# Patient Record
Sex: FEMALE | Race: WHITE | Employment: PART TIME | URBAN - METROPOLITAN AREA
[De-identification: names, ages, dates, MRNs, and addresses within clinical notes are randomized per-mention and may not be internally consistent; named-entity substitution may affect disease eponyms.]

---

## 2017-01-20 ENCOUNTER — HOSPITAL ENCOUNTER (OUTPATIENT)
Dept: PHYSICAL THERAPY | Age: 62
Discharge: HOME | End: 2017-01-31
Attending: ORTHOPAEDIC SURGERY | Admitting: ORTHOPAEDIC SURGERY

## 2017-01-20 ASSESSMENT — PAIN DESCRIPTION - LOCATION: LOCATION: BACK;BUTTOCKS;HIP;LEG

## 2017-01-20 ASSESSMENT — PAIN SCALES - GENERAL: PAINLEVEL_OUTOF10: 4

## 2017-01-20 ASSESSMENT — PAIN DESCRIPTION - PAIN TYPE: TYPE: CHRONIC PAIN

## 2017-01-20 ASSESSMENT — PAIN DESCRIPTION - FREQUENCY: FREQUENCY: CONTINUOUS

## 2017-01-20 ASSESSMENT — PAIN DESCRIPTION - PROGRESSION: CLINICAL_PROGRESSION: GRADUALLY WORSENING

## 2017-01-20 ASSESSMENT — PAIN DESCRIPTION - ONSET: ONSET: PROGRESSIVE

## 2017-01-20 ASSESSMENT — PAIN DESCRIPTION - DESCRIPTORS: DESCRIPTORS: SHARP;RADIATING

## 2017-01-20 ASSESSMENT — PAIN DESCRIPTION - ORIENTATION: ORIENTATION: RIGHT

## 2017-01-23 ENCOUNTER — HOSPITAL ENCOUNTER (OUTPATIENT)
Dept: PHYSICAL THERAPY | Age: 62
Discharge: HOME | End: 2017-01-23
Admitting: ORTHOPAEDIC SURGERY

## 2017-01-25 ENCOUNTER — HOSPITAL ENCOUNTER (OUTPATIENT)
Dept: PHYSICAL THERAPY | Age: 62
Discharge: HOME | End: 2017-01-25
Admitting: ORTHOPAEDIC SURGERY

## 2017-01-30 ENCOUNTER — HOSPITAL ENCOUNTER (OUTPATIENT)
Dept: PHYSICAL THERAPY | Age: 62
Discharge: HOME | End: 2017-01-30
Admitting: ORTHOPAEDIC SURGERY

## 2017-02-01 ENCOUNTER — HOSPITAL ENCOUNTER (OUTPATIENT)
Dept: OTHER | Age: 62
Discharge: HOME | End: 2017-02-28
Attending: ORTHOPAEDIC SURGERY | Admitting: ORTHOPAEDIC SURGERY

## 2017-02-06 ENCOUNTER — HOSPITAL ENCOUNTER (OUTPATIENT)
Dept: PHYSICAL THERAPY | Age: 62
Discharge: HOME | End: 2017-02-06
Admitting: ORTHOPAEDIC SURGERY

## 2017-02-08 ENCOUNTER — HOSPITAL ENCOUNTER (OUTPATIENT)
Dept: PHYSICAL THERAPY | Age: 62
Discharge: HOME | End: 2017-02-08
Admitting: ORTHOPAEDIC SURGERY

## 2017-03-01 ENCOUNTER — HOSPITAL ENCOUNTER (OUTPATIENT)
Dept: OTHER | Age: 62
Discharge: HOME | End: 2017-03-14
Attending: ORTHOPAEDIC SURGERY | Admitting: ORTHOPAEDIC SURGERY

## 2017-08-22 ENCOUNTER — HOSPITAL ENCOUNTER (OUTPATIENT)
Dept: WOMENS IMAGING | Age: 62
Discharge: HOME | End: 2017-08-22
Attending: ORTHOPAEDIC SURGERY | Admitting: NURSE PRACTITIONER

## 2017-08-22 DIAGNOSIS — Z12.31 VISIT FOR SCREENING MAMMOGRAM: ICD-10-CM

## 2018-05-16 ENCOUNTER — HOSPITAL ENCOUNTER (OUTPATIENT)
Dept: WOMENS IMAGING | Age: 63
Discharge: HOME | End: 2018-05-16
Attending: NURSE PRACTITIONER | Admitting: NURSE PRACTITIONER

## 2018-05-16 DIAGNOSIS — Z13.820 OSTEOPOROSIS SCREENING: ICD-10-CM

## 2018-05-16 DIAGNOSIS — Z13.820 ENCOUNTER FOR SCREENING FOR OSTEOPOROSIS: ICD-10-CM

## 2018-05-21 ENCOUNTER — HOSPITAL ENCOUNTER (OUTPATIENT)
Dept: PHYSICAL THERAPY | Age: 63
Discharge: HOME | End: 2018-05-31
Attending: ORTHOPAEDIC SURGERY | Admitting: ORTHOPAEDIC SURGERY

## 2018-05-21 DIAGNOSIS — Z13.820 ENCOUNTER FOR SCREENING FOR OSTEOPOROSIS: ICD-10-CM

## 2018-05-23 ENCOUNTER — HOSPITAL ENCOUNTER (OUTPATIENT)
Dept: PHYSICAL THERAPY | Age: 63
Discharge: HOME | End: 2018-05-23
Admitting: ORTHOPAEDIC SURGERY

## 2018-05-30 ENCOUNTER — HOSPITAL ENCOUNTER (OUTPATIENT)
Dept: PHYSICAL THERAPY | Age: 63
Discharge: HOME | End: 2018-05-30
Admitting: ORTHOPAEDIC SURGERY

## 2018-06-01 ENCOUNTER — HOSPITAL ENCOUNTER (OUTPATIENT)
Dept: PHYSICAL THERAPY | Age: 63
Discharge: HOME | End: 2018-06-01
Admitting: ORTHOPAEDIC SURGERY

## 2018-06-01 ENCOUNTER — HOSPITAL ENCOUNTER (OUTPATIENT)
Dept: OTHER | Age: 63
Discharge: HOME | End: 2018-06-30
Attending: ORTHOPAEDIC SURGERY | Admitting: ORTHOPAEDIC SURGERY

## 2018-06-04 ENCOUNTER — HOSPITAL ENCOUNTER (OUTPATIENT)
Dept: PHYSICAL THERAPY | Age: 63
Discharge: HOME | End: 2018-06-04
Admitting: ORTHOPAEDIC SURGERY

## 2018-06-18 ENCOUNTER — HOSPITAL ENCOUNTER (OUTPATIENT)
Dept: PHYSICAL THERAPY | Age: 63
Discharge: HOME | End: 2018-06-18
Admitting: ORTHOPAEDIC SURGERY

## 2018-06-20 ENCOUNTER — HOSPITAL ENCOUNTER (OUTPATIENT)
Dept: PHYSICAL THERAPY | Age: 63
Discharge: HOME | End: 2018-06-20
Admitting: ORTHOPAEDIC SURGERY

## 2018-06-25 ENCOUNTER — HOSPITAL ENCOUNTER (OUTPATIENT)
Dept: PHYSICAL THERAPY | Age: 63
Discharge: HOME | End: 2018-06-25
Admitting: ORTHOPAEDIC SURGERY

## 2018-06-29 ENCOUNTER — HOSPITAL ENCOUNTER (OUTPATIENT)
Dept: PHYSICAL THERAPY | Age: 63
Discharge: HOME | End: 2018-06-29
Admitting: ORTHOPAEDIC SURGERY

## 2018-07-01 ENCOUNTER — HOSPITAL ENCOUNTER (OUTPATIENT)
Dept: OTHER | Age: 63
Discharge: HOME | End: 2018-07-31
Attending: ORTHOPAEDIC SURGERY | Admitting: ORTHOPAEDIC SURGERY

## 2018-07-02 ENCOUNTER — HOSPITAL ENCOUNTER (OUTPATIENT)
Dept: PHYSICAL THERAPY | Age: 63
Discharge: HOME | End: 2018-07-02
Admitting: ORTHOPAEDIC SURGERY

## 2018-07-02 NOTE — FLOWSHEET NOTE
--  -       -    STANDING   -       -    Side stepping with TB 10'x8 GTB -- -    Shuttle Leg press   DL  SL   x15, 3C  x30 B, 1C   3C 10x2  1C x 15 ea LE - 3C 10x2  1C x 20 ea LE   DL HS curl machine x15, 30# DL 30#  x20  SL 15# x 15 ea - DL 30#  x20  SL 15# x 15 ea   Calf stretch FR 1'  FR 1'x2 -  FR 1'x2   HS stretch 1' B 1' B - 1' B   Calf raises x25 FR x 20  - FR x 20    Abduction ball squeeze with LAQ  --  -    Wobble board  1' ea 1' x 2 ea - 1' ea x 2 ea   Anterior step ups   x20, 4\" 4\" 10x2 - 4\" 15x  ea   Anterior step downs   x20, 4\" 4\" 10x2 - 4\" x 15 ea   Lateral step ups --  - 4\" x15 ea   Iso glute w/ abll Unable   -    Sit<>stands   x10, 20\" 20\" 10x2 - --   Anterior lunge   --  -    Seated HS curl   x25 B BTB BTB 10x2 ea - --     Other Therapeutic Activities/Education:     Home Exercise Program:  Added HS and gastroc stretch 6/18/18; added seated HS curl 6/20/18    Modality/intervention used:    [] Therapeutic Exercise  [] Modalities:  [] Therapeutic Activity     [] Ultrasound  [] Elec  Stim  [] Gait Training      [] Cervical Traction [] Lumbar Traction  [x] Neuromuscular Re-education    [] Cold/hotpack [] Iontophoresis   [] Instruction in HEP      [x] Vasopneumatic     [x] Manual Therapy               [] Aquatic Therapy     Manual Treatments:  None    Modalities: none    Communication with other providers:  None    Education provided to patient/caregiver:  . Adverse reactions to treatment:  None    Equipment provided:  None    Assessment:  Pt tolerated treatment fair today. Pt was able to add lateral step ups today. Pt struggled with regular steps today. Pt rated pain at 0/10 in B knees after treatment. Pt will continue to benefit from more strengthening.       Time In / Time Out:   1350/1443    Timed Code/Total Treatment Minutes:  53'/ 48'  3 TA ( 45') 1 neuro ( 15')      Patients Report of Tolerance:    [] Patient limited by fatigue        [] Patient limited by pain   [] Patient limited by other

## 2018-07-02 NOTE — PROGRESS NOTES
Outpatient Physical Therapy           [place]           [x] Phone: [phone]   Fax: [phone]  [place]           [] Phone: [phone]   Fax: [phone]      To: Pratik Kamara    From: Arthur Campos, PT     Patient: Roetta Bloch                  : 1955  Diagnosis: Bilateral Knee OA       Date: 2018  Treatment Diagnosis:  decreased bilateral knee ROM, strength, and balance       [x]  Progress Note                []  Discharge Note    Evaluation Date:     Total Visits to date:  6  Cancels/No-shows to date:        Plan of Care/Treatment to date:  [x] Therapeutic Exercise    [] Modalities:  [x] Therapeutic Activity     [] Ultrasound  [] Electrical Stimulation  [x] Gait Training      [] Cervical Traction   [] Lumbar Traction  [x] Neuromuscular Re-education  [] Cold/hotpack [] Iontophoresis  [x] Instruction in HEP      Other:  [x] Manual Therapy       []  Vasopneumatic  [] Aquatic Therapy       []                    ? Objective/Significant Findings At Last Visit/Comments:    KNEE  AROM  Flexion 110 right, 102 left  Extension    MMT  Flexion 4+/5 left, 4/5 right  Extension 5/5 left, 4/5 right    KOOS 39% from 89% at eval.       Assessment:   Myron Meier has been seen in PT for 11 visits with bilateral knee OA. PT session have been focused on improving ROM, strength and functional mobility. She has made mild strength gains but knee is still quite weak. There has been significant improvement in functional outcome questionnaire. Still limited with ability to safely perform stairs. Pt will continue to benefit from PT addressing ROM and strength to help improve functional mobilty. Goal Status:  [] Achieved [x] Partially Achieved  [] Not Achieved     Changes to goals:    Short term goals  Time Frame for Short term goals: 3 weeks   Short term goal 1:  Increase knee flex bilaterally to 130 NOT MET  Short term goal 2: Pt will decrease KOOS score to 18 points  MET  Short term goal 3: Pt able to complete sit to stand from 18\" height chair without use of UE MET     Long term goals  Time Frame for Long term goals : 6 weeks   Long term goal 1: Pt will decrease KOOS score to 11 points MET  Long term goal 2: Increase right knee extension MMT to 5/5 NOT MET  Long term goal 3: Pt will complete flight of stairs with single handrail reciprocally NOT MET      G-Code Selection: (On Eval and every 10th visit or Discharge)  MEASURE  [x] Mobility: Walking and Moving Around     [x] Current ()   [x] Goal ()   [] DC ()  [] Changing/Maintaining Body Position     [] Current (3753)      [] Goal ()   [] DC ()  [] Carrying / Moving / Handling Objects     [] Current ()   [] Goal ()   [] DC ()  [] Self-Care     [] Current ()   [] Goal ()   [] DC ()  [] Other PT/OT primary DX     [] Current ()   [] Goal ()   [] DC ()    SEVERITY  CURRENT  GOAL  DISCHARGE   [] CH (0% Impaired, Indep.)  [] CI (1-19% Impaired, SBA-CGA)  [x] CJ (20-39% Impaired, MIN A)  [] CK  (40-59% Impairment, Mod A)  [] CL  (60-79% Impairment, Max A)  [] CM  (80-99% Impairment, Dep.)   [] CN  (100% Impairment, Tot Dep.) [] CH (0% Impaired, Indep.)  [x] CI (1-19% Impaired, SBA-CGA)  [] CJ (20-39% Impaired, MIN A)  [] CK  (40-59% Impairment, Mod A)  [] CL  (60-79% Impairment, Max A)  [] CM  (80-99% Impairment, Dep.)   [] CN  (100% Impairment, Tot Dep.)  [] CH (0% Impaired, Indep.)  [] CI (1-19% Impaired, SBA-CGA)  [] CJ (20-39% Impaired, MIN A)  [] CK  (40-59% Impairment, Mod A)  [] CL  (60-79% Impairment, Max A)  [] CM  (80-99% Impairment, Dep.)   [] CN  (100% Impairment, Tot Dep.)             Frequency/Duration:  # Days per week: [x] 1 day # Weeks: [] 1 week [] 4 weeks [] 8 weeks     [] 2 days?    [] 2 weeks [] 5 weeks [] 10 weeks     [] 3 days   [] 3 weeks [x] 6 weeks [] 12 weeks         Patient Status: [x] Continue per initial plan of Care     [] Patient now discharged     [] Additional visits

## 2018-07-06 ENCOUNTER — HOSPITAL ENCOUNTER (OUTPATIENT)
Dept: PHYSICAL THERAPY | Age: 63
Discharge: HOME | End: 2018-07-06
Admitting: ORTHOPAEDIC SURGERY

## 2018-07-06 NOTE — FLOWSHEET NOTE
Outpatient Physical Therapy           [place]           [x] Phone: [phone]   Fax: [phone]  [place]           [] Phone: [phone]   Fax: [phone]    Physical Therapy Daily Treatment Note  Date:  2018    Patient Timur Walden                   :  1955                   MRN: [id number]  Restrictions/Precautions: None  Diagnosis:   Bilateral Knee OA   Date of Surgery:   Treatment Diagnosis:  decreased bilateral knee ROM, strength, and balance     Insurance/Certification information:  [facility]  Referring Physician:   Suman Thrasher   Next Doctor Visit:  prn  Plan of care signed (Y/N):  Y  Visit# / total visits:    Pain level:  0 /10 R knee and   L knee  0/10    Goals:         Short term goals  Time Frame for Short term goals: 3 weeks   Short term goal 1: Increase knee flex bilaterally to 130   Short term goal 2: Pt will decrease KOOS score to 18 points   Short term goal 3: Pt able to complete sit to stand from 18\" height chair without use of UE MET    Long term goals  Time Frame for Long term goals : 6 weeks   Long term goal 1: Pt will decrease KOOS score to 11 points   Long term goal 2: Increase right knee extension MMT to 5/5   Long term goal 3: Pt will complete flight of stairs with single handrail reciprocally         Subjective: Pt states knees feel good.  Wants to do more balance activities today    Any changes in Ambulatory Summary Sheet? no      Objective: KNE  AROM  Flexion 110 right, 102 left      Exercises:  Exercise/Equipment 18         Fan bike 5' 5' 5'         SLR       Supine clamshells      Supine hip abduction      Bridges            STANDING            Side stepping with TB --     Shuttle Leg press   DL  SL  Eccentric   3C 10x2  1C x 15 ea LE   3C 10x2  1C x 20 ea LE   X20, 3C  X20, 1C B  X15, 2C R   DL HS curl machine DL 30#  x20  SL 15# x 15 ea DL 30#  x20  SL 15# x 15 ea X20, 35#   Calf stretch FR  FR 1'x2  FR 1'x2 --   HS stretch 1' B 1' B --   Calf raises FR x 20  FR x 20  --   Abduction ball squeeze with LAQ    --   Anterior step ups   4\" 10x2 4\" 15x  ea X20, 6\"   Anterior step downs   4\" 10x2 4\" x 15 ea X17, 4\"   Lateral step ups  4\" x15 ea --   Sit<>stands   20\" 10x2 -- --   Anterior lunge     --   Seated HS curl   BTB 10x2 ea -- --   BALANCE      WObble board   1' ea   BOSU balance   1'   BOSU mini squats   x10   BOSU lunge   x15 B     Other Therapeutic Activities/Education:     Home Exercise Program:  Added HS and gastroc stretch 6/18/18; added seated HS curl 6/20/18    Modality/intervention used:    [] Therapeutic Exercise  [] Modalities:  [] Therapeutic Activity     [] Ultrasound  [] Elec  Stim  [] Gait Training      [] Cervical Traction [] Lumbar Traction  [x] Neuromuscular Re-education    [] Cold/hotpack [] Iontophoresis   [] Instruction in HEP      [x] Vasopneumatic     [x] Manual Therapy               [] Aquatic Therapy     Manual Treatments:  None    Modalities: none    Communication with other providers:  None    Education provided to patient/caregiver:  . Adverse reactions to treatment:  None    Equipment provided:  None    Assessment:  Pt did well with session. Flexion ROM still limited, unable to grab pinke SB for isolate glut strengthening. Cues needed for HS curl machine to activities hamstring more for increasing knee flexion. Recurvatum thrust ascending stairs.      Time In / Time Out:   1519/1601    Timed Code/Total Treatment Minutes:   42/42; 10 Nr (1), 10 TA (1), 22 TE (1)     Patients Report of Tolerance:    [] Patient limited by fatigue        [] Patient limited by pain   [] Patient limited by other medical complications   [x] Other:  Pt responds very well today to treatment     Prognosis:   [] Good [x] Fair  [] Poor    Plan:   [x] Continue per plan of care [] Alter current plan (see comments)  [] Plan of care initiated [] Hold pending MD visit [] Discharge    Plan for Next Session:  Continue with HEP, ROM, strength, and

## 2018-07-16 ENCOUNTER — HOSPITAL ENCOUNTER (OUTPATIENT)
Dept: PHYSICAL THERAPY | Age: 63
Discharge: HOME | End: 2018-07-16
Admitting: ORTHOPAEDIC SURGERY

## 2018-07-23 ENCOUNTER — HOSPITAL ENCOUNTER (OUTPATIENT)
Dept: PHYSICAL THERAPY | Age: 63
Discharge: HOME | End: 2018-07-23
Admitting: ORTHOPAEDIC SURGERY

## 2018-07-23 NOTE — FLOWSHEET NOTE
Outpatient Physical Therapy           [place]           [x] Phone: [phone]   Fax: [phone]  [place]           [] Phone: [phone]   Fax: [phone]    Physical Therapy Daily Treatment Note  Date:  2018    Patient Julian Dinh                   :  1955                   MRN: [id number]  Restrictions/Precautions: None  Diagnosis:   Bilateral Knee OA   Date of Surgery:   Treatment Diagnosis:  decreased bilateral knee ROM, strength, and balance     Insurance/Certification information:  [facility]  Referring Physician:   Zunilda Linton   Next Doctor Visit:  prn  Plan of care signed (Y/N):  Y  Visit# / total visits:    Pain level:  0/10 R knee and   L knee  0/10    Goals:         Short term goals  Time Frame for Short term goals: 3 weeks   Short term goal 1: Increase knee flex bilaterally to 130   Short term goal 2: Pt will decrease KOOS score to 18 points   Short term goal 3: Pt able to complete sit to stand from 18\" height chair without use of UE MET    Long term goals  Time Frame for Long term goals : 6 weeks   Long term goal 1: Pt will decrease KOOS score to 11 points   Long term goal 2: Increase right knee extension MMT to 5/5   Long term goal 3: Pt will complete flight of stairs with single handrail reciprocally         Subjective: Pt states knee has been feeling pretty goo. Has bbeen working a lot on squats. Big toe hurts from a corn.      Any changes in Ambulatory Summary Sheet? no      Objective: KNE  AROM  Flex  Ext      Exercises:  Exercise/Equipment 18         Fan bike 5' 7' 6'         SLR   -- --   Supine clamshells  -- --   Supine hip abduction  - --   Bridges  -- * eccentric         STANDING            Side stepping with TB  -- --   Shuttle Leg press   DL  SL  Eccentric   X20, 3C  X20, 1C B  X15, 2C R   X20, 3C  10x2 B, 1C  x15 B, 2C   X20, 3C  10x2 B, 2C  x20 B, 2C   DL HS curl machine X20, 35# X20, 35# * do concentric and eccentric   Calf stretch FR -- 1' 1'   HS stretch -- 1' --   Calf raises FR -- x20 --   Abduction ball squeeze with LAQ  -- -- --   Anterior step ups   X20, 6\" -- --   Anterior step downs   X17, 4\" -- --   Lateral step ups -- -- --   Sit<>stands   -- -- --   Anterior lunge   -- -- --   Seated HS curl   -- - --   Total Gym Squats    --   Lateral Walkouts    --   Stool pulls DL   40'   Stool push DL   40'         BALANCE      Wobble board 1' ea -- --   BOSU balance 1' 1' 1'   BOSU mini squats x10 10x2 x20   BOSU lunge x15 B x20 B x20 B   Semitandem airex EC   1' ea                                         Other Therapeutic Activities/Education:     Home Exercise Program:  Added HS and gastroc stretch 6/18/18; added seated HS curl 6/20/18    Modality/intervention used:    [] Therapeutic Exercise  [] Modalities:  [] Therapeutic Activity     [] Ultrasound  [] Elec  Stim  [] Gait Training      [] Cervical Traction [] Lumbar Traction  [x] Neuromuscular Re-education    [] Cold/hotpack [] Iontophoresis   [] Instruction in HEP      [x] Vasopneumatic     [x] Manual Therapy               [] Aquatic Therapy     Manual Treatments:  None    Modalities: none    Communication with other providers:  None    Education provided to patient/caregiver:  . Adverse reactions to treatment:  None    Equipment provided:  None     Assessment:  Pt noted to have decreased balance with eyes closed activities. Cues needed to promote knee flexion/quad control with lunges and mini squats on BOSU. Able to increase SL saut on shuttle indicating improving strength and endurance. Significant difficulty with stool pushes and pulls from decreased strength.  End session pain 0/10    Time In / Time Out:  1110/1157; 47/47; 10 NR (1), 10 TA (1), 27 (1)    Timed Code/Total Treatment Minutes:        Patients Report of Tolerance:    [] Patient limited by fatigue        [] Patient limited by pain   [] Patient limited by other medical complications   [x] Other:  Pt responds very well

## 2018-07-30 ENCOUNTER — HOSPITAL ENCOUNTER (OUTPATIENT)
Dept: PHYSICAL THERAPY | Age: 63
Discharge: HOME | End: 2018-07-30
Admitting: ORTHOPAEDIC SURGERY

## 2018-07-30 NOTE — FLOWSHEET NOTE
Outpatient Physical Therapy           [place]           [x] Phone: [phone]   Fax: [phone]  [facility]           [] Phone: [phone]   Fax: [phone]    Physical Therapy Daily Treatment Note  Date:  2018    Patient Kera Kumar                   :  1955                   MRN: [id number]  Restrictions/Precautions: None  Diagnosis:   Bilateral Knee OA   Date of Surgery:   Treatment Diagnosis:  decreased bilateral knee ROM, strength, and balance     Insurance[facility]Certification information:  [facility]  Referring Physician:   Talia Perez   Next Doctor Visit:  prn  Plan of care signed (Y/N):  Y  Visit# / total visits:  15/18  Pain level:  0/10 R knee and   L knee  0/10    Goals:         Short term goals  Time Frame for Short term goals: 3 weeks   Short term goal 1: Increase knee flex bilaterally to 130   Short term goal 2: Pt will decrease KOOS score to 18 points   Short term goal 3: Pt able to complete sit to stand from 18\" height chair without use of UE MET    Long term goals  Time Frame for Long term goals : 6 weeks   Long term goal 1: Pt will decrease KOOS score to 11 points   Long term goal 2: Increase right knee extension MMT to 5/5   Long term goal 3: Pt will complete flight of stairs with single handrail reciprocally         Subjective: Pt reported no pain in knees, just feels a bit stiff in the knee. Any changes in Ambulatory Summary Sheet? no      Objective: Pt demonstrated fear to go down the steps with reciprocal gait pattern but was able to successfully walk up stairs with reciprocal gait pattern while using single handrail. Pt instructed to use handrail on left side to provide more stability on right knee. Pt was able to successfully descend the stairs using a reciprocal gait pattern while using handrail on L side after instruction of alternate handrail use.      KNEE AROM  Flex R 121 L 106    MMT  Flexion 4+/5 left, 4/5 right  Extension 5/5 left, 4/5 right     KOOS 32% Exercises:  Exercise/Equipment 7/16/18 7/23/18 7/30/18         Fan bike 7' 6' 6'         SLR  -- -- --   Supine clamshells -- -- --   Supine hip abduction - -- --   Queenie Warwick -- * eccentric --         STANDING            Side stepping with TB -- -- --   Shuttle Leg press   DL  SL  Eccentric   X20, 3C  10x2 B, 1C  x15 B, 2C   X20, 3C  10x2 B, 2C  x20 B, 2C   X15 4C  X15 2C B  X15 B, 3C   DL HS curl machine X20, 35# * do concentric and eccentric X20, 40# Concentric   Calf stretch FR 1' 1' --   HS stretch 1' -- --   Calf raises FR x20 -- --   Anterior step ups   -- -- --   Anterior step downs   -- -- --   Lateral step ups -- -- --   Sit<>stands   -- -- --   Anterior lunge   -- -- --   Seated HS curl   - -- --   Total Gym Squats   -- --   Lateral Walkouts   -- --   Stool pulls DL  40' 40'   Stool push DL  40' 40'         BALANCE      Wobble board -- -- --   BOSU balance 1' 1' 1'   BOSU mini squats 10x2 x20 X20    BOSU lunge x20 B x20 B X20 B   Semitandem airex EC  1' ea 1' ea                                         Other Therapeutic Activities/Education:  Pt performed gait training on stairs using reciprocal gait and a single handrail X4 laps. Pt instructed to use handrail on left side to provide more stability on right knee.      Home Exercise Program:  Added HS and gastroc stretch 6/18/18; added seated HS curl 6/20/18    Modality/intervention used:    [] Therapeutic Exercise  [] Modalities:  [] Therapeutic Activity     [] Ultrasound  [] Elec  Stim  [] Gait Training      [] Cervical Traction [] Lumbar Traction  [x] Neuromuscular Re-education    [] Cold/hotpack [] Iontophoresis   [] Instruction in HEP      [x] Vasopneumatic     [x] Manual Therapy               [] Aquatic Therapy     Manual Treatments:  None    Modalities: none    Communication with other providers:  None    Education provided to patient/caregiver: none    Adverse reactions to treatment:  None    Equipment provided:  None     Assessment:  Pt tolerated

## 2018-07-30 NOTE — DISCHARGE SUMMARY
Outpatient Physical Therapy           [place]           [x] Phone: [phone]   Fax: [phone]  [place]           [] Phone: [phone]   Fax: [phone]      To: Tomasa England    From: Yahir Tristan, PT     Patient: Renee Davison                  : 1955  Diagnosis: Bilateral Knee OA       Date: 2018  Treatment Diagnosis:  decreased bilateral knee ROM, strength, and balance       []  Progress Note                [x]  Discharge Note    Evaluation Date:     Total Visits to date:  13  Cancels/No-shows to date:        Plan of Care/Treatment to date:  [x] Therapeutic Exercise    [] Modalities:  [x] Therapeutic Activity     [] Ultrasound  [] Electrical Stimulation  [x] Gait Training      [] Cervical Traction   [] Lumbar Traction  [x] Neuromuscular Re-education  [] Cold/hotpack [] Iontophoresis  [x] Instruction in HEP      Other:  [x] Manual Therapy       []  Vasopneumatic  [] Aquatic Therapy       []                    ? Objective/Significant Findings At Last Visit/Comments:      KNEE AROM  Flex R 121 L 106    MMT  Flexion 4+/5 left, 4/5 right  Extension 5/5 left, 4/5 right     KOOS 32% from 89% at eval.       Assessment:   Pipo Tamayo has been seen in PT for 15 visits with bilateral knee OA. PT session have been focused on improving ROM, strength and functional mobility. She has made mild strength gains but overall functional mobility has significantly improved and reporting significantly decreased pan levels. Pt has bee      Goal Status:  [] Achieved [x] Partially Achieved  [] Not Achieved     Changes to goals:    Short term goals  Time Frame for Short term goals: 3 weeks   Short term goal 1:  Increase knee flex bilaterally to 130 NOT MET  Short term goal 2: Pt will decrease KOOS score to 18 points  MET  Short term goal 3: Pt able to complete sit to stand from 18\" height chair without use of UE MET     Long term goals  Time Frame for Long term goals : 6 weeks   Long term goal 1: Pt will

## 2018-08-27 ENCOUNTER — HOSPITAL ENCOUNTER (OUTPATIENT)
Dept: WOMENS IMAGING | Age: 63
Discharge: HOME | End: 2018-08-27
Attending: NURSE PRACTITIONER | Admitting: NURSE PRACTITIONER

## 2018-08-27 DIAGNOSIS — Z12.31 ENCOUNTER FOR SCREENING MAMMOGRAM FOR MALIGNANT NEOPLASM OF BREAST: ICD-10-CM

## 2018-08-27 DIAGNOSIS — Z12.39 BREAST CANCER SCREENING: ICD-10-CM

## 2020-03-03 ENCOUNTER — APPOINTMENT (OUTPATIENT)
Dept: GENERAL RADIOLOGY | Age: 65
End: 2020-03-03
Payer: COMMERCIAL

## 2020-03-03 ENCOUNTER — HOSPITAL ENCOUNTER (EMERGENCY)
Age: 65
Discharge: HOME | End: 2020-03-03
Attending: EMERGENCY MEDICINE
Payer: COMMERCIAL

## 2020-03-03 VITALS
HEIGHT: 68 IN | TEMPERATURE: 98.1 F | BODY MASS INDEX: 35.16 KG/M2 | DIASTOLIC BLOOD PRESSURE: 82 MMHG | HEART RATE: 87 BPM | OXYGEN SATURATION: 97 % | RESPIRATION RATE: 18 BRPM | WEIGHT: 232 LBS | SYSTOLIC BLOOD PRESSURE: 138 MMHG

## 2020-03-03 LAB
ALBUMIN SERPL-MCNC: 4.1 GM/DL (ref 3.4–5)
ALP BLD-CCNC: 83 IU/L (ref 40–128)
ALT SERPL-CCNC: 21 U/L (ref 10–40)
ANION GAP SERPL CALCULATED.3IONS-SCNC: 12 MMOL/L (ref 4–16)
AST SERPL-CCNC: 21 IU/L (ref 15–37)
BASOPHILS ABSOLUTE: 0 K/CU MM
BASOPHILS RELATIVE PERCENT: 0.3 % (ref 0–1)
BILIRUB SERPL-MCNC: 0.3 MG/DL (ref 0–1)
BUN BLDV-MCNC: 17 MG/DL (ref 6–23)
CALCIUM SERPL-MCNC: 9.7 MG/DL (ref 8.3–10.6)
CHLORIDE BLD-SCNC: 104 MMOL/L (ref 99–110)
CHP ED QC CHECK: YES
CO2: 24 MMOL/L (ref 21–32)
CREAT SERPL-MCNC: 0.8 MG/DL (ref 0.6–1.1)
DIFFERENTIAL TYPE: ABNORMAL
EOSINOPHILS ABSOLUTE: 0.2 K/CU MM
EOSINOPHILS RELATIVE PERCENT: 1.9 % (ref 0–3)
GFR AFRICAN AMERICAN: >60 ML/MIN/1.73M2
GFR NON-AFRICAN AMERICAN: >60 ML/MIN/1.73M2
GLUCOSE BLD-MCNC: 102 MG/DL
GLUCOSE BLD-MCNC: 102 MG/DL (ref 70–99)
GLUCOSE BLD-MCNC: 108 MG/DL (ref 70–99)
HCT VFR BLD CALC: 43.8 % (ref 37–47)
HEMOGLOBIN: 13.5 GM/DL (ref 12.5–16)
IMMATURE NEUTROPHIL %: 0.2 % (ref 0–0.43)
LYMPHOCYTES ABSOLUTE: 2.8 K/CU MM
LYMPHOCYTES RELATIVE PERCENT: 31.3 % (ref 24–44)
MCH RBC QN AUTO: 29.1 PG (ref 27–31)
MCHC RBC AUTO-ENTMCNC: 30.8 % (ref 32–36)
MCV RBC AUTO: 94.4 FL (ref 78–100)
MONOCYTES ABSOLUTE: 0.7 K/CU MM
MONOCYTES RELATIVE PERCENT: 7.8 % (ref 0–4)
NUCLEATED RBC %: 0 %
PDW BLD-RTO: 12.8 % (ref 11.7–14.9)
PLATELET # BLD: 263 K/CU MM (ref 140–440)
PMV BLD AUTO: 9.5 FL (ref 7.5–11.1)
POTASSIUM SERPL-SCNC: 4.3 MMOL/L (ref 3.5–5.1)
PRO-BNP: 1372 PG/ML
RBC # BLD: 4.64 M/CU MM (ref 4.2–5.4)
SEGMENTED NEUTROPHILS ABSOLUTE COUNT: 5.2 K/CU MM
SEGMENTED NEUTROPHILS RELATIVE PERCENT: 58.5 % (ref 36–66)
SODIUM BLD-SCNC: 140 MMOL/L (ref 135–145)
TOTAL IMMATURE NEUTOROPHIL: 0.02 K/CU MM
TOTAL NUCLEATED RBC: 0 K/CU MM
TOTAL PROTEIN: 7.3 GM/DL (ref 6.4–8.2)
TROPONIN T: <0.01 NG/ML
TSH HIGH SENSITIVITY: 3.22 UIU/ML (ref 0.27–4.2)
WBC # BLD: 8.9 K/CU MM (ref 4–10.5)

## 2020-03-03 PROCEDURE — 85025 COMPLETE CBC W/AUTO DIFF WBC: CPT

## 2020-03-03 PROCEDURE — 80053 COMPREHEN METABOLIC PANEL: CPT

## 2020-03-03 PROCEDURE — 93005 ELECTROCARDIOGRAM TRACING: CPT | Performed by: PHYSICIAN ASSISTANT

## 2020-03-03 PROCEDURE — 84484 ASSAY OF TROPONIN QUANT: CPT

## 2020-03-03 PROCEDURE — 82962 GLUCOSE BLOOD TEST: CPT

## 2020-03-03 PROCEDURE — 84443 ASSAY THYROID STIM HORMONE: CPT

## 2020-03-03 PROCEDURE — 83880 ASSAY OF NATRIURETIC PEPTIDE: CPT

## 2020-03-03 PROCEDURE — 99284 EMERGENCY DEPT VISIT MOD MDM: CPT

## 2020-03-03 PROCEDURE — 36415 COLL VENOUS BLD VENIPUNCTURE: CPT

## 2020-03-03 PROCEDURE — 71045 X-RAY EXAM CHEST 1 VIEW: CPT

## 2020-03-03 PROCEDURE — 93005 ELECTROCARDIOGRAM TRACING: CPT | Performed by: EMERGENCY MEDICINE

## 2020-03-03 NOTE — ED PROVIDER NOTES
As triage Physician Assistant, I performed a medical screening history and physical exam on this patient. HISTORY OF PRESENT ILLNESS  Sven Cranker is a 60 y.o. female  who presents with shakiness, nausea, headache, paleness, episode of face that was \"beet red\". Onset approximately 30 minutes prior to arrival.    Patient has the above symptoms and is concerned that these may be related to recent increased dose of antidepressant medication from 150 mg to 300 mg. Does not recall name of this medication. PHYSICAL EXAM  BP (!) 141/85   Pulse 95   Temp 98.1 °F (36.7 °C) (Oral)   Resp 18   Ht 5' 8\" (1.727 m)   Wt 232 lb (105.2 kg)   SpO2 96%   BMI 35.28 kg/m²         On exam, the patient appears well-hydrated, well-nourished, and in no acute distress. Mucous membranes are moist. Breathing is unlabored. Skin is dry. Mental status appears normal. Moves all extremities, and is without facial droop. Speech is clear. Any necessary Labs, Imaging, and/or treatment were initiated and patient moved to an emergency department exam room. Comment: Please note this report has been produced using speech recognition software and may contain errors related to that system including errors in grammar, punctuation, and spelling, as well as words and phrases that may be inappropriate. If there are any questions or concerns please feel free to contact the dictating provider for clarification.        Annika Nicolas, [address]  69/45/17 [id number]

## 2020-03-03 NOTE — ED TRIAGE NOTES
Pt to ED because she feels she may be having a reaction to her antidepressant medication. Pt states they recently changed the dosing.

## 2020-03-04 LAB
EKG ATRIAL RATE: 87 BPM
EKG ATRIAL RATE: 89 BPM
EKG DIAGNOSIS: NORMAL
EKG DIAGNOSIS: NORMAL
EKG P AXIS: 52 DEGREES
EKG P-R INTERVAL: 138 MS
EKG P-R INTERVAL: 156 MS
EKG Q-T INTERVAL: 346 MS
EKG Q-T INTERVAL: 358 MS
EKG QRS DURATION: 84 MS
EKG QRS DURATION: 90 MS
EKG QTC CALCULATION (BAZETT): 420 MS
EKG QTC CALCULATION (BAZETT): 430 MS
EKG R AXIS: -1 DEGREES
EKG R AXIS: -11 DEGREES
EKG T AXIS: 156 DEGREES
EKG T AXIS: 71 DEGREES
EKG VENTRICULAR RATE: 87 BPM
EKG VENTRICULAR RATE: 89 BPM

## 2020-03-04 PROCEDURE — 93010 ELECTROCARDIOGRAM REPORT: CPT | Performed by: INTERNAL MEDICINE

## 2020-03-04 NOTE — ED PROVIDER NOTES
per session: Not on file    Stress: Not on file   Relationships    Social connections:     Talks on phone: Not on file     Gets together: Not on file     Attends Confucianist service: Not on file     Active member of club or organization: Not on file     Attends meetings of clubs or organizations: Not on file     Relationship status: Not on file    Intimate partner violence:     Fear of current or ex partner: Not on file     Emotionally abused: Not on file     Physically abused: Not on file     Forced sexual activity: Not on file   Other Topics Concern    Not on file   Social History Narrative    Not on file     No current facility-administered medications for this encounter. Current Outpatient Medications   Medication Sig Dispense Refill    cyclobenzaprine (FLEXERIL) 10 MG tablet Take 10 mg by mouth 3 times daily as needed for Muscle spasms.  NONFORMULARY Take 75 mg by mouth. \"Arthritis\" medicine.  docusate sodium (COLACE) 100 MG capsule Take 100 mg by mouth 2 times daily as needed for Constipation.  NONFORMULARY Take  by mouth. \"Medicine for Ulcer in Eye\"       Allergies   Allergen Reactions    Keflex [Cephalexin]        Nursing Notes Reviewed    Physical Exam:  ED Triage Vitals [03/03/20 1838]   Enc Vitals Group      BP (!) 141/85      Pulse 95      Resp 18      Temp 98.1 °F (36.7 °C)      Temp Source Oral      SpO2 96 %      Weight 232 lb (105.2 kg)      Height 5' 8\" (1.727 m)      Head Circumference       Peak Flow       Pain Score       Pain Loc       Pain Edu? Excl. in [address]? GENERAL APPEARANCE: Awake and alert. Cooperative. No acute distress. HEAD: Normocephalic. Atraumatic. EYES: EOM's grossly intact. Sclera anicteric. Pupils equal and reactive  ENT: Mucous membranes are moist. Tolerates saliva. No trismus. NECK: Supple. No meningismus. Trachea midline. HEART: RRR. Radial pulses 2+. LUNGS: Respirations unlabored. CTAB  ABDOMEN: Soft. Non-tender.  No guarding or rebound. EXTREMITIES: No acute deformities. SKIN: Warm and dry. NEUROLOGICAL: No gross facial drooping. Moves all 4 extremities spontaneously. Full strength and sensation in all extremities. PSYCHIATRIC: Normal mood.     I have reviewed and interpreted all of the currently available lab results from this visit (if applicable):  Results for orders placed or performed during the hospital encounter of 03/03/20   CBC Auto Differential   Result Value Ref Range    WBC 8.9 4.0 - 10.5 K/CU MM    RBC 4.64 4.2 - 5.4 M/CU MM    Hemoglobin 13.5 12.5 - 16.0 GM/DL    Hematocrit 43.8 37 - 47 %    MCV 94.4 78 - 100 FL    MCH 29.1 27 - 31 PG    MCHC 30.8 (L) 32.0 - 36.0 %    RDW 12.8 11.7 - 14.9 %    Platelets 766 097 - 896 K/CU MM    MPV 9.5 7.5 - 11.1 FL    Differential Type AUTOMATED DIFFERENTIAL     Segs Relative 58.5 36 - 66 %    Lymphocytes % 31.3 24 - 44 %    Monocytes % 7.8 (H) 0 - 4 %    Eosinophils % 1.9 0 - 3 %    Basophils % 0.3 0 - 1 %    Segs Absolute 5.2 K/CU MM    Lymphocytes Absolute 2.8 K/CU MM    Monocytes Absolute 0.7 K/CU MM    Eosinophils Absolute 0.2 K/CU MM    Basophils Absolute 0.0 K/CU MM    Nucleated RBC % 0.0 %    Total Nucleated RBC 0.0 K/CU MM    Total Immature Neutrophil 0.02 K/CU MM    Immature Neutrophil % 0.2 0 - 0.43 %   Comprehensive Metabolic Panel   Result Value Ref Range    Sodium 140 135 - 145 MMOL/L    Potassium 4.3 3.5 - 5.1 MMOL/L    Chloride 104 99 - 110 mMol/L    CO2 24 21 - 32 MMOL/L    BUN 17 6 - 23 MG/DL    CREATININE 0.8 0.6 - 1.1 MG/DL    Glucose 108 (H) 70 - 99 MG/DL    Calcium 9.7 8.3 - 10.6 MG/DL    Alb 4.1 3.4 - 5.0 GM/DL    Total Protein 7.3 6.4 - 8.2 GM/DL    Total Bilirubin 0.3 0.0 - 1.0 MG/DL    ALT 21 10 - 40 U/L    AST 21 15 - 37 IU/L    Alkaline Phosphatase 83 40 - 128 IU/L    GFR Non-African American >60 >60 mL/min/1.73m2    GFR African American >60 >60 mL/min/1.73m2    Anion Gap 12 4 - 16   Troponin   Result Value Ref Range    Troponin T <0.010 <0.01 NG/ML   Brain

## 2020-08-21 ENCOUNTER — APPOINTMENT (OUTPATIENT)
Dept: GENERAL RADIOLOGY | Age: 65
DRG: 418 | End: 2020-08-21
Payer: MEDICARE

## 2020-08-21 ENCOUNTER — HOSPITAL ENCOUNTER (INPATIENT)
Age: 65
LOS: 3 days | Discharge: HOME HEALTH | DRG: 418 | End: 2020-08-24
Attending: INTERNAL MEDICINE | Admitting: INTERNAL MEDICINE
Payer: MEDICARE

## 2020-08-21 ENCOUNTER — APPOINTMENT (OUTPATIENT)
Dept: MRI IMAGING | Age: 65
DRG: 418 | End: 2020-08-21
Payer: MEDICARE

## 2020-08-21 ENCOUNTER — APPOINTMENT (OUTPATIENT)
Dept: CT IMAGING | Age: 65
DRG: 418 | End: 2020-08-21
Payer: MEDICARE

## 2020-08-21 ENCOUNTER — APPOINTMENT (OUTPATIENT)
Dept: ULTRASOUND IMAGING | Age: 65
DRG: 418 | End: 2020-08-21
Payer: MEDICARE

## 2020-08-21 PROBLEM — K85.10 ACUTE PANCREATITIS DUE TO CALCULUS OF COMMON BILE DUCT: Status: ACTIVE | Noted: 2020-08-21

## 2020-08-21 LAB
ALBUMIN SERPL-MCNC: 4 GM/DL (ref 3.4–5)
ALP BLD-CCNC: 141 IU/L (ref 40–129)
ALT SERPL-CCNC: 180 U/L (ref 10–40)
ANION GAP SERPL CALCULATED.3IONS-SCNC: 9 MMOL/L (ref 4–16)
AST SERPL-CCNC: 450 IU/L (ref 15–37)
BACTERIA: NEGATIVE /HPF
BASOPHILS ABSOLUTE: 0 K/CU MM
BASOPHILS RELATIVE PERCENT: 0.4 % (ref 0–1)
BILIRUB SERPL-MCNC: 1.6 MG/DL (ref 0–1)
BILIRUBIN URINE: NEGATIVE MG/DL
BLOOD, URINE: NEGATIVE
BUN BLDV-MCNC: 15 MG/DL (ref 6–23)
CALCIUM SERPL-MCNC: 9 MG/DL (ref 8.3–10.6)
CHLORIDE BLD-SCNC: 101 MMOL/L (ref 99–110)
CLARITY: CLEAR
CO2: 28 MMOL/L (ref 21–32)
COLOR: ABNORMAL
CREAT SERPL-MCNC: 0.8 MG/DL (ref 0.6–1.1)
DIFFERENTIAL TYPE: ABNORMAL
EOSINOPHILS ABSOLUTE: 0.1 K/CU MM
EOSINOPHILS RELATIVE PERCENT: 0.9 % (ref 0–3)
GFR AFRICAN AMERICAN: >60 ML/MIN/1.73M2
GFR NON-AFRICAN AMERICAN: >60 ML/MIN/1.73M2
GLUCOSE BLD-MCNC: 105 MG/DL (ref 70–99)
GLUCOSE, URINE: NEGATIVE MG/DL
HCT VFR BLD CALC: 38.5 % (ref 37–47)
HEMOGLOBIN: 12 GM/DL (ref 12.5–16)
IMMATURE NEUTROPHIL %: 0.3 % (ref 0–0.43)
KETONES, URINE: NEGATIVE MG/DL
LEUKOCYTE ESTERASE, URINE: NEGATIVE
LIPASE: 1606 IU/L (ref 13–60)
LIPASE: 976 IU/L (ref 13–60)
LYMPHOCYTES ABSOLUTE: 1 K/CU MM
LYMPHOCYTES RELATIVE PERCENT: 12.4 % (ref 24–44)
MCH RBC QN AUTO: 28.2 PG (ref 27–31)
MCHC RBC AUTO-ENTMCNC: 31.2 % (ref 32–36)
MCV RBC AUTO: 90.4 FL (ref 78–100)
MONOCYTES ABSOLUTE: 0.4 K/CU MM
MONOCYTES RELATIVE PERCENT: 4.7 % (ref 0–4)
MUCUS: ABNORMAL HPF
NITRITE URINE, QUANTITATIVE: NEGATIVE
NUCLEATED RBC %: 0 %
PDW BLD-RTO: 13.3 % (ref 11.7–14.9)
PH, URINE: 7 (ref 5–8)
PLATELET # BLD: 179 K/CU MM (ref 140–440)
PMV BLD AUTO: 9.5 FL (ref 7.5–11.1)
POTASSIUM SERPL-SCNC: 4.1 MMOL/L (ref 3.5–5.1)
PROTEIN UA: NEGATIVE MG/DL
RBC # BLD: 4.26 M/CU MM (ref 4.2–5.4)
RBC URINE: <1 /HPF (ref 0–6)
SEGMENTED NEUTROPHILS ABSOLUTE COUNT: 6.2 K/CU MM
SEGMENTED NEUTROPHILS RELATIVE PERCENT: 81.3 % (ref 36–66)
SODIUM BLD-SCNC: 138 MMOL/L (ref 135–145)
SPECIFIC GRAVITY UA: 1.01 (ref 1–1.03)
SQUAMOUS EPITHELIAL: 3 /HPF
TOTAL IMMATURE NEUTOROPHIL: 0.02 K/CU MM
TOTAL NUCLEATED RBC: 0 K/CU MM
TOTAL PROTEIN: 6.7 GM/DL (ref 6.4–8.2)
TRICHOMONAS: ABNORMAL /HPF
TROPONIN T: <0.01 NG/ML
UROBILINOGEN, URINE: 2 MG/DL (ref 0.2–1)
WBC # BLD: 7.7 K/CU MM (ref 4–10.5)
WBC UA: <1 /HPF (ref 0–5)

## 2020-08-21 PROCEDURE — 36415 COLL VENOUS BLD VENIPUNCTURE: CPT

## 2020-08-21 PROCEDURE — 83690 ASSAY OF LIPASE: CPT

## 2020-08-21 PROCEDURE — 76705 ECHO EXAM OF ABDOMEN: CPT

## 2020-08-21 PROCEDURE — 74177 CT ABD & PELVIS W/CONTRAST: CPT

## 2020-08-21 PROCEDURE — 71045 X-RAY EXAM CHEST 1 VIEW: CPT

## 2020-08-21 PROCEDURE — 2580000003 HC RX 258: Performed by: NURSE PRACTITIONER

## 2020-08-21 PROCEDURE — 93010 ELECTROCARDIOGRAM REPORT: CPT | Performed by: INTERNAL MEDICINE

## 2020-08-21 PROCEDURE — 81001 URINALYSIS AUTO W/SCOPE: CPT

## 2020-08-21 PROCEDURE — 2580000003 HC RX 258: Performed by: PHYSICIAN ASSISTANT

## 2020-08-21 PROCEDURE — 6360000002 HC RX W HCPCS: Performed by: PHYSICIAN ASSISTANT

## 2020-08-21 PROCEDURE — 84484 ASSAY OF TROPONIN QUANT: CPT

## 2020-08-21 PROCEDURE — 85025 COMPLETE CBC W/AUTO DIFF WBC: CPT

## 2020-08-21 PROCEDURE — 1200000000 HC SEMI PRIVATE

## 2020-08-21 PROCEDURE — 6360000004 HC RX CONTRAST MEDICATION: Performed by: PHYSICIAN ASSISTANT

## 2020-08-21 PROCEDURE — 74181 MRI ABDOMEN W/O CONTRAST: CPT

## 2020-08-21 PROCEDURE — 99285 EMERGENCY DEPT VISIT HI MDM: CPT

## 2020-08-21 PROCEDURE — 2500000003 HC RX 250 WO HCPCS: Performed by: PHYSICIAN ASSISTANT

## 2020-08-21 PROCEDURE — 93005 ELECTROCARDIOGRAM TRACING: CPT | Performed by: PHYSICIAN ASSISTANT

## 2020-08-21 PROCEDURE — 99221 1ST HOSP IP/OBS SF/LOW 40: CPT | Performed by: SURGERY

## 2020-08-21 PROCEDURE — 72100 X-RAY EXAM L-S SPINE 2/3 VWS: CPT

## 2020-08-21 PROCEDURE — 6360000002 HC RX W HCPCS: Performed by: NURSE PRACTITIONER

## 2020-08-21 PROCEDURE — 80053 COMPREHEN METABOLIC PANEL: CPT

## 2020-08-21 RX ORDER — SODIUM CHLORIDE 0.9 % (FLUSH) 0.9 %
10 SYRINGE (ML) INJECTION EVERY 12 HOURS SCHEDULED
Status: DISCONTINUED | OUTPATIENT
Start: 2020-08-21 | End: 2020-08-24 | Stop reason: HOSPADM

## 2020-08-21 RX ORDER — SODIUM CHLORIDE 0.9 % (FLUSH) 0.9 %
10 SYRINGE (ML) INJECTION PRN
Status: DISCONTINUED | OUTPATIENT
Start: 2020-08-21 | End: 2020-08-24 | Stop reason: HOSPADM

## 2020-08-21 RX ORDER — SODIUM CHLORIDE 9 MG/ML
INJECTION, SOLUTION INTRAVENOUS CONTINUOUS
Status: DISCONTINUED | OUTPATIENT
Start: 2020-08-21 | End: 2020-08-24 | Stop reason: HOSPADM

## 2020-08-21 RX ORDER — PROMETHAZINE HYDROCHLORIDE 25 MG/1
12.5 TABLET ORAL EVERY 6 HOURS PRN
Status: DISCONTINUED | OUTPATIENT
Start: 2020-08-21 | End: 2020-08-24 | Stop reason: HOSPADM

## 2020-08-21 RX ORDER — MORPHINE SULFATE 4 MG/ML
2 INJECTION, SOLUTION INTRAMUSCULAR; INTRAVENOUS ONCE
Status: COMPLETED | OUTPATIENT
Start: 2020-08-21 | End: 2020-08-21

## 2020-08-21 RX ORDER — KETOROLAC TROMETHAMINE 30 MG/ML
15 INJECTION, SOLUTION INTRAMUSCULAR; INTRAVENOUS EVERY 6 HOURS PRN
Status: DISCONTINUED | OUTPATIENT
Start: 2020-08-21 | End: 2020-08-24 | Stop reason: HOSPADM

## 2020-08-21 RX ORDER — 0.9 % SODIUM CHLORIDE 0.9 %
1000 INTRAVENOUS SOLUTION INTRAVENOUS ONCE
Status: COMPLETED | OUTPATIENT
Start: 2020-08-21 | End: 2020-08-21

## 2020-08-21 RX ORDER — NAPROXEN SODIUM 550 MG/1
550 TABLET ORAL 2 TIMES DAILY WITH MEALS
Status: ON HOLD | COMMUNITY
End: 2020-08-23 | Stop reason: HOSPADM

## 2020-08-21 RX ORDER — MORPHINE SULFATE 2 MG/ML
2 INJECTION, SOLUTION INTRAMUSCULAR; INTRAVENOUS EVERY 4 HOURS PRN
Status: DISPENSED | OUTPATIENT
Start: 2020-08-21 | End: 2020-08-22

## 2020-08-21 RX ORDER — ONDANSETRON 2 MG/ML
4 INJECTION INTRAMUSCULAR; INTRAVENOUS EVERY 30 MIN PRN
Status: DISCONTINUED | OUTPATIENT
Start: 2020-08-21 | End: 2020-08-24 | Stop reason: HOSPADM

## 2020-08-21 RX ORDER — FENTANYL CITRATE 50 UG/ML
50 INJECTION, SOLUTION INTRAMUSCULAR; INTRAVENOUS ONCE
Status: COMPLETED | OUTPATIENT
Start: 2020-08-21 | End: 2020-08-21

## 2020-08-21 RX ORDER — ACETAMINOPHEN 325 MG/1
650 TABLET ORAL EVERY 4 HOURS PRN
Status: DISCONTINUED | OUTPATIENT
Start: 2020-08-21 | End: 2020-08-24 | Stop reason: HOSPADM

## 2020-08-21 RX ORDER — SODIUM CHLORIDE, SODIUM LACTATE, POTASSIUM CHLORIDE, CALCIUM CHLORIDE 600; 310; 30; 20 MG/100ML; MG/100ML; MG/100ML; MG/100ML
INJECTION, SOLUTION INTRAVENOUS CONTINUOUS
Status: DISCONTINUED | OUTPATIENT
Start: 2020-08-21 | End: 2020-08-22

## 2020-08-21 RX ORDER — SODIUM CHLORIDE, SODIUM LACTATE, POTASSIUM CHLORIDE, CALCIUM CHLORIDE 600; 310; 30; 20 MG/100ML; MG/100ML; MG/100ML; MG/100ML
INJECTION, SOLUTION INTRAVENOUS CONTINUOUS
Status: DISCONTINUED | OUTPATIENT
Start: 2020-08-22 | End: 2020-08-22

## 2020-08-21 RX ORDER — BUPROPION HYDROCHLORIDE 300 MG/1
300 TABLET ORAL EVERY MORNING
COMMUNITY

## 2020-08-21 RX ORDER — ACYCLOVIR 800 MG/1
200 TABLET ORAL 2 TIMES DAILY
COMMUNITY

## 2020-08-21 RX ORDER — LORATADINE 10 MG/1
10 CAPSULE, LIQUID FILLED ORAL DAILY
COMMUNITY

## 2020-08-21 RX ORDER — ONDANSETRON 2 MG/ML
4 INJECTION INTRAMUSCULAR; INTRAVENOUS EVERY 6 HOURS PRN
Status: DISCONTINUED | OUTPATIENT
Start: 2020-08-21 | End: 2020-08-24 | Stop reason: HOSPADM

## 2020-08-21 RX ADMIN — ONDANSETRON 4 MG: 2 INJECTION INTRAMUSCULAR; INTRAVENOUS at 12:34

## 2020-08-21 RX ADMIN — PIPERACILLIN AND TAZOBACTAM 3.38 G: 3; .375 INJECTION, POWDER, FOR SOLUTION INTRAVENOUS at 15:05

## 2020-08-21 RX ADMIN — MORPHINE SULFATE 2 MG: 4 INJECTION, SOLUTION INTRAMUSCULAR; INTRAVENOUS at 12:34

## 2020-08-21 RX ADMIN — PIPERACILLIN AND TAZOBACTAM 3.38 G: 3; .375 INJECTION, POWDER, FOR SOLUTION INTRAVENOUS at 21:49

## 2020-08-21 RX ADMIN — ONDANSETRON 4 MG: 2 INJECTION INTRAMUSCULAR; INTRAVENOUS at 15:41

## 2020-08-21 RX ADMIN — SODIUM CHLORIDE 1000 ML: 9 INJECTION, SOLUTION INTRAVENOUS at 12:34

## 2020-08-21 RX ADMIN — IOPAMIDOL 80 ML: 755 INJECTION, SOLUTION INTRAVENOUS at 14:05

## 2020-08-21 RX ADMIN — FENTANYL CITRATE 50 MCG: 50 INJECTION, SOLUTION INTRAMUSCULAR; INTRAVENOUS at 15:06

## 2020-08-21 RX ADMIN — SODIUM CHLORIDE: 9 INJECTION, SOLUTION INTRAVENOUS at 15:06

## 2020-08-21 RX ADMIN — FAMOTIDINE 20 MG: 10 INJECTION INTRAVENOUS at 12:34

## 2020-08-21 ASSESSMENT — PAIN DESCRIPTION - LOCATION
LOCATION: BACK
LOCATION: HEAD

## 2020-08-21 ASSESSMENT — PAIN SCALES - GENERAL
PAINLEVEL_OUTOF10: 9
PAINLEVEL_OUTOF10: 10
PAINLEVEL_OUTOF10: 4
PAINLEVEL_OUTOF10: 8
PAINLEVEL_OUTOF10: 0

## 2020-08-21 ASSESSMENT — PAIN DESCRIPTION - PAIN TYPE: TYPE: ACUTE PAIN

## 2020-08-21 ASSESSMENT — PAIN DESCRIPTION - DESCRIPTORS: DESCRIPTORS: SPASM

## 2020-08-21 ASSESSMENT — PAIN DESCRIPTION - ORIENTATION: ORIENTATION: MID

## 2020-08-21 NOTE — ED NOTES
Pt back from MRI     Eufemia Reece Dress, [facility]  08/21/20 0575

## 2020-08-21 NOTE — ED NOTES
Pt taken to MRI;      Eufemia Dan [facility] [place], [facility]  08/21/20 1283

## 2020-08-21 NOTE — ED NOTES
Nurse (HS) will obtain blood samples through peripheral IV. Informed patient of situation.       Willia Litten  08/21/20 1158

## 2020-08-21 NOTE — ED NOTES
[address] paged hospitalist     Neva Dasilva  08/21/20 1410  [address] level with [facility] returned call     Neva Dasilva  08/21/20 5117

## 2020-08-21 NOTE — ED TRIAGE NOTES
Pt to ED with c/o back pain, states 10/10 pain, spasms. NKI, smoked marijuana last night to help with pain but was awakened at 0400 because of continued pain.

## 2020-08-21 NOTE — ED PROVIDER NOTES
12 lead EKG per my interpretation:  Normal Sinus Rhythm 88  Axis is   Left axis deviation  QTc is  438  There is specific T wave changes appreciated. Inverted T wave AVL  There is no specific ST wave changes appreciated.     Prior EKG to compare with was not available         Fidencio Weller DO  08/21/20 1212

## 2020-08-21 NOTE — CONSULTS
APRN - CNP        acetaminophen (TYLENOL) tablet 650 mg  650 mg Oral Q4H PRN June Stagger, APRVIVIANA - CNP        promethazine (PHENERGAN) tablet 12.5 mg  12.5 mg Oral Q6H PRN June Stagger, ALBERTO - CNP        Or    ondansetron (ZOFRAN) injection 4 mg  4 mg Intravenous Q6H PRN June Stagger, ALBERTO - CNP        [START ON 8/22/2020] enoxaparin (LOVENOX) injection 40 mg  40 mg Subcutaneous Daily ALBERTO Orosco - CNP        0.9 % sodium chloride infusion   Intravenous Continuous [place] Julita,  mL/hr at 08/21/20 1506      piperacillin-tazobactam (ZOSYN) 3.375 g in dextrose 5 % 50 mL IVPB extended infusion (mini-bag)  3.375 g Intravenous Q8H ALBERTO Orosco - CNP        ketorolac (TORADOL) injection 15 mg  15 mg Intravenous Q6H PRN June Stagger, APRVIVIANA - CNP        morphine (PF) injection 2 mg  2 mg Intravenous Q4H PRN June Stagger, APRVIVIANA - CNP           Allergies:  Keflex [cephalexin]    Social History:   Social History     Socioeconomic History    Marital status: Single     Spouse name: None    Number of children: None    Years of education: None    Highest education level: None   Occupational History    None   Social Needs    Financial resource strain: None    Food insecurity     Worry: None     Inability: None    Transportation needs     Medical: None     Non-medical: None   Tobacco Use    Smoking status: Never Smoker   Substance and Sexual Activity    Alcohol use: Yes     Comment: rarely    Drug use: Yes     Types: Marijuana    Sexual activity: None   Lifestyle    Physical activity     Days per week: None     Minutes per session: None    Stress: None   Relationships    Social connections     Talks on phone: None     Gets together: None     Attends Mormonism service: None     Active member of club or organization: None     Attends meetings of clubs or organizations: None     Relationship status: None    Intimate partner violence     Fear of current or ex partner: None     Emotionally abused: None     Physically abused: None     Forced sexual activity: None   Other Topics Concern    None   Social History Narrative    None       Family History:   History reviewed. No pertinent family history. REVIEW OFSYSTEMS:    Review of Systems   Constitutional: Negative for chills. Negative for fever. HENT: Negative for congestion. Negative for rhinorrhea. Respiratory: Negative for cough. Negative for shortness of breath. Negative for wheezing. Cardiovascular: Negative for chest pain. Gastrointestinal: as per HPI  Genitourinary: Negative for difficulty urinating. Neurological: Negative for dizziness, syncope and numbness. Hematological: Does not bruise/bleed easily.        PHYSICAL EXAM:  Vitals:    08/21/20 1627 08/21/20 1631 08/21/20 1700 08/21/20 1713   BP:  121/75 129/75    Pulse: 85 86 91 90   Resp: 17 16 16    Temp:   98.3 °F (36.8 °C)    TempSrc:   Oral    SpO2: 99% 99% 97%    Weight:       Height:           Physical Exam  General: awake, alert, in no acute distress  HEENT: mucous membranes moist  Respiratory: normal effort, no wheezes appreciated  CV: appears well perfused, regular rate and rhythm  Abdomen: Soft, mild epigastric tenderness, no rebound or guarding  Skin: warm and dry  Extremities: atraumatic, some skin discoloration of the lower legs  Neuro: no focal deficits noted  Psych: mood normal        DATA:    Lab Results   Component Value Date    WBC 7.7 08/21/2020    HGB 12.0 (L) 08/21/2020    HCT 38.5 08/21/2020    MCV 90.4 08/21/2020     08/21/2020     Lab Results   Component Value Date     08/21/2020    K 4.1 08/21/2020     08/21/2020    CO2 28 08/21/2020    BUN 15 08/21/2020    CREATININE 0.8 08/21/2020    GLUCOSE 105 08/21/2020    CALCIUM 9.0 08/21/2020        IMPRESSION:    60 y.o. female with gallstone pancreatitis    Patient Active Problem List:     Acute pancreatitis due to calculus of common bile duct    PLAN:  - trend LFTs  - serial abdominal exams  - will need cholecystectomy once symptoms improve and prior to discharge        Electronically signed by Bhavin Watkins MD on 8/21/2020 at 7:49 PM

## 2020-08-21 NOTE — ED PROVIDER NOTES
EMERGENCY DEPARTMENT ENCOUNTER      PCP: ALBERTO Mares - CNP    CHIEF COMPLAINT    Chief Complaint   Patient presents with    Back Pain     \"up and down my back\", woke pt up out of sleep, no focal deficts     Of note, this patient was not evaluated by supervising physician. HPI    Mario Ramírez is a 60 y.o. female who presents to the emergency department with a chief complaint of back pain. Patient states that she was at her normal state of health last night, she states that she woke up around 4 AM with \"spasming\" into the back, she locates in the bilateral lower lumbar spine area. She states that she also had an episode of vomiting. She states that she does not feel well, the pain has continued. She is been nauseated. She is denying chest pain shortness of breath lightheadedness dizziness. No other abdominal pains. Denies urinary symptoms. She does verbalize \"something just does not feel right\". She does take Naprosyn for her chronic back pain. Denies any other significant medical or surgical history. REVIEW OF SYSTEMS    Constitutional:  Denies fever, chills, weight loss or weakness   HENT:  Denies sore throat or ear pain   Cardiovascular:  Denies chest pain, palpitations   Respiratory:  Denies cough or shortness of breath    GI:  Denies abdominal pain, nausea, vomiting, or diarrhea  :  Denies any urinary symptoms or vaginal symptoms. Musculoskeletal:  See HPI.   Skin:  Denies rash  Neurologic:  Denies headache, focal weakness or sensory changes   Endocrine:  Denies polyuria or polydypsia   Lymphatic:  Denies swollen glands     All other review of systems are negative  See HPI and nursing notes for additional information     PAST MEDICAL AND SURGICAL HISTORY    Past Medical History:   Diagnosis Date    H/O arthritis     09- Patient reports arthritis in Knee    History of IBS     09- Patient told she has IBS     Past Surgical History:   Procedure Laterality Date    COLONOSCOPY  9-    HYSTERECTOMY      total       CURRENT MEDICATIONS        ALLERGIES    Allergies   Allergen Reactions    Keflex [Cephalexin]        SOCIAL AND FAMILY HISTORY    Social History     Socioeconomic History    Marital status: Single     Spouse name: None    Number of children: None    Years of education: None    Highest education level: None   Occupational History    None   Social Needs    Financial resource strain: None    Food insecurity     Worry: None     Inability: None    Transportation needs     Medical: None     Non-medical: None   Tobacco Use    Smoking status: Never Smoker   Substance and Sexual Activity    Alcohol use: Yes     Comment: rarely    Drug use: Yes     Types: Marijuana    Sexual activity: None   Lifestyle    Physical activity     Days per week: None     Minutes per session: None    Stress: None   Relationships    Social connections     Talks on phone: None     Gets together: None     Attends Restorationism service: None     Active member of club or organization: None     Attends meetings of clubs or organizations: None     Relationship status: None    Intimate partner violence     Fear of current or ex partner: None     Emotionally abused: None     Physically abused: None     Forced sexual activity: None   Other Topics Concern    None   Social History Narrative    None     History reviewed. No pertinent family history. PHYSICAL EXAM    VITAL SIGNS: /75   Pulse 90   Temp 98.3 °F (36.8 °C) (Oral)   Resp 16   Ht 5' 8\" (1.727 m)   Wt 215 lb (97.5 kg)   SpO2 97%   BMI 32.69 kg/m²    Constitutional:  Well developed, Well nourished. No distress  HENT:  Normocephalic, Atraumatic, PERRL. EOMI. Sclera clear. Conjunctiva normal, No discharge. Neck/Lymphatics: supple, no JVD, no swollen nodes  Cardiovascular:   RRR,  no murmurs/rubs/gallops. No JVD  No carotid bruits or murmurs heard in carotids. Respiratory:  Nonlabored breathing.   Normal breath sounds, No wheezing  Abdomen: Bowel sounds normal, Soft, No tenderness, no masses. Musculoskeletal:    There is no edema, asymmetry, or calf / thigh tenderness bilaterally. No cyanosis. No cool or pale-appearing limb. Distal cap refill and pulses intact bilateral upper and lower extremities  Bilateral upper and lower extremity ROM intact without pain or obvious deficit  Integument:  Warm, Dry  Neurologic: Alert & oriented , No focal deficits noted. Cranial nerves II through XII grossly intact. Normal gross motor coordination & motor strength bilateral upper and lower extremities  Sensation intact.   Psychiatric:  Affect normal, Mood normal.     Labs:  Results for orders placed or performed during the hospital encounter of 08/21/20   CBC auto diff   Result Value Ref Range    WBC 7.7 4.0 - 10.5 K/CU MM    RBC 4.26 4.2 - 5.4 M/CU MM    Hemoglobin 12.0 (L) 12.5 - 16.0 GM/DL    Hematocrit 38.5 37 - 47 %    MCV 90.4 78 - 100 FL    MCH 28.2 27 - 31 PG    MCHC 31.2 (L) 32.0 - 36.0 %    RDW 13.3 11.7 - 14.9 %    Platelets 741 045 - 268 K/CU MM    MPV 9.5 7.5 - 11.1 FL    Differential Type AUTOMATED DIFFERENTIAL     Segs Relative 81.3 (H) 36 - 66 %    Lymphocytes % 12.4 (L) 24 - 44 %    Monocytes % 4.7 (H) 0 - 4 %    Eosinophils % 0.9 0 - 3 %    Basophils % 0.4 0 - 1 %    Segs Absolute 6.2 K/CU MM    Lymphocytes Absolute 1.0 K/CU MM    Monocytes Absolute 0.4 K/CU MM    Eosinophils Absolute 0.1 K/CU MM    Basophils Absolute 0.0 K/CU MM    Nucleated RBC % 0.0 %    Total Nucleated RBC 0.0 K/CU MM    Total Immature Neutrophil 0.02 K/CU MM    Immature Neutrophil % 0.3 0 - 0.43 %   CMP   Result Value Ref Range    Sodium 138 135 - 145 MMOL/L    Potassium 4.1 3.5 - 5.1 MMOL/L    Chloride 101 99 - 110 mMol/L    CO2 28 21 - 32 MMOL/L    BUN 15 6 - 23 MG/DL    CREATININE 0.8 0.6 - 1.1 MG/DL    Glucose 105 (H) 70 - 99 MG/DL    Calcium 9.0 8.3 - 10.6 MG/DL    Alb 4.0 3.4 - 5.0 GM/DL    Total Protein 6.7 6.4 - 8.2 GM/DL    Total Bilirubin 1.6 (H) 0.0 - 1.0 MG/DL     (H) 10 - 40 U/L     (H) 15 - 37 IU/L    Alkaline Phosphatase 141 (H) 40 - 129 IU/L    GFR Non-African American >60 >60 mL/min/1.73m2    GFR African American >60 >60 mL/min/1.73m2    Anion Gap 9 4 - 16   Troponin   Result Value Ref Range    Troponin T <0.010 <0.01 NG/ML   Lipase   Result Value Ref Range    Lipase 1,606 (H) 13 - 60 IU/L   Urinalysis (Lab)   Result Value Ref Range    Color, UA EDUARDO (A) YELLOW    Clarity, UA CLEAR CLEAR    Glucose, Urine NEGATIVE NEGATIVE MG/DL    Bilirubin Urine NEGATIVE NEGATIVE MG/DL    Ketones, Urine NEGATIVE NEGATIVE MG/DL    Specific Gravity, UA 1.015 1.001 - 1.035    Blood, Urine NEGATIVE NEGATIVE    pH, Urine 7.0 5.0 - 8.0    Protein, UA NEGATIVE NEGATIVE MG/DL    Urobilinogen, Urine 2.0 (H) 0.2 - 1.0 MG/DL    Nitrite Urine, Quantitative NEGATIVE NEGATIVE    Leukocyte Esterase, Urine NEGATIVE NEGATIVE    RBC, UA <1 0 - 6 /HPF    WBC, UA <1 0 - 5 /HPF    Bacteria, UA NEGATIVE NEGATIVE /HPF    Squam Epithel, UA 3 /HPF    Mucus, UA RARE (A) NEGATIVE HPF    Trichomonas, UA NONE SEEN NONE SEEN /HPF   EKG 12 Lead   Result Value Ref Range    Ventricular Rate 88 BPM    Atrial Rate 88 BPM    P-R Interval 126 ms    QRS Duration 98 ms    Q-T Interval 362 ms    QTc Calculation (Bazett) 438 ms    P Axis 40 degrees    R Axis -6 degrees    T Axis 110 degrees    Diagnosis       Normal sinus rhythm  Moderate voltage criteria for LVH, may be normal variant  T wave abnormality, consider lateral ischemia  Abnormal ECG  When compared with ECG of 03-MAR-2020 20:20,  T wave inversion now evident in Lateral leads  Confirmed by Herlene Christina, TAQUERIA ([id number]) on 8/21/2020 3:19:11 PM       EKG    EKG 12 Lead   Result Value Ref Range    Ventricular Rate 88 BPM    Atrial Rate 88 BPM    P-R Interval 126 ms    QRS Duration 98 ms    Q-T Interval 362 ms    QTc Calculation (Bazett) 438 ms    P Axis 40 degrees    R Axis -6 degrees    T Axis 110 degrees    Diagnosis Normal sinus rhythm  Moderate voltage criteria for LVH, may be normal variant  T wave abnormality, consider lateral ischemia  Abnormal ECG  When compared with ECG of 03-MAR-2020 20:20,  T wave inversion now evident in Lateral leads  Confirmed by TAQUERIA Shi ([id number]) on 8/21/2020 3:19:11 PM         RADIOLOGY    Xr Lumbar Spine (2-3 Views)    Result Date: 8/21/2020  EXAMINATION: THREE XRAY VIEWS OF THE LUMBAR SPINE 8/21/2020 12:12 pm COMPARISON: None. HISTORY: ORDERING SYSTEM PROVIDED HISTORY: low back pain TECHNOLOGIST PROVIDED HISTORY: Reason for exam:->low back pain Reason for Exam: low back pain FINDINGS: Lumbar vertebral bodies are normal in height and alignment. No evidence of fracture. Visualized sacrum is unremarkable. Multilevel moderate degenerative disc disease. Multilevel moderate degenerative disc disease. Ct Abdomen Pelvis W Iv Contrast Additional Contrast? None    Result Date: 8/21/2020  EXAMINATION: CT OF THE ABDOMEN AND PELVIS WITH CONTRAST 8/21/2020 2:06 pm TECHNIQUE: CT of the abdomen and pelvis was performed with the administration of intravenous contrast. Multiplanar reformatted images are provided for review. Dose modulation, iterative reconstruction, and/or weight based adjustment of the mA/kV was utilized to reduce the radiation dose to as low as reasonably achievable. COMPARISON: Right upper quadrant ultrasound, earlier same day HISTORY: ORDERING SYSTEM PROVIDED HISTORY: abdominal pain ,back pain TECHNOLOGIST PROVIDED HISTORY: Reason for exam:->abdominal pain ,back pain Additional Contrast?->None Reason for Exam: abdominal pain ,back pain Acuity: Acute Type of Exam: Initial Relevant Medical/Surgical History: 80 ML ISOU E 370 FINDINGS: Lower Chest: The lung bases are clear. There are no pleural or pericardial effusions. Organs: There is abnormal inflammatory stranding surrounding the head of the pancreas and uncinate process. There is no pancreatic mass identified.  There is no abnormal fluid collection identified. Pancreas enhances normally. The superior mesenteric vein is patent. The gallbladder wall is thickened. Known gallstones on the prior ultrasound are not evident on the current CT scan. The common bile duct is dilated. There is diffuse hepatic steatosis. The spleen and adrenal glands are normal in appearance. The kidneys are normal in appearance. GI/Bowel: There are no abnormally dilated loops of large or small bowel present. There is no mesenteric inflammatory stranding present. The appendix is normal.  A fat containing umbilical hernia is noted. Pelvis: The urinary bladder is normal in appearance. There is no free fluid or pelvic mass. Peritoneum/Retroperitoneum: There is no pathologically enlarged lymphadenopathy present. Bones/Soft Tissues: No lytic or blastic osseous lesions are identified. 1. Abnormal peripancreatic inflammatory stranding surrounding the head of the pancreas consistent with acute pancreatitis. No pseudocyst or pancreatic necrosis evident. 2. Gallbladder wall thickening, likely secondarily inflamed. Known gallstones are not evident though present on the prior CT. 3. Hepatic steatosis. Xr Chest Portable    Result Date: 8/21/2020  EXAMINATION: ONE XRAY VIEW OF THE CHEST 8/21/2020 12:12 pm COMPARISON: 03/03/2020 HISTORY: ORDERING SYSTEM PROVIDED HISTORY: back pain, nausesa TECHNOLOGIST PROVIDED HISTORY: Reason for exam:->back pain, nausesa Reason for Exam: back pain, nausea FINDINGS: The lungs are without acute focal process. There is no effusion or pneumothorax. The cardiomediastinal silhouette is without acute process. The osseous structures are without acute process. No acute process.      Mri Abdomen Wo Contrast Mrcp    Result Date: 8/21/2020  EXAMINATION: MRI OF THE ABDOMEN WITHOUT CONTRAST AND MRCP 8/21/2020 3:49 pm TECHNIQUE: Multiplanar multisequence MRI of the abdomen was performed without the administration of intravenous contrast.  After initial T2 axial and coronal images, thick slab, thin slab and 3D coronal MRCP sequences were obtained without the administration of intravenous contrast.  T1 weighted in/out of phase axial images also acquired. MIP images are provided for review. COMPARISON: CT and ultrasound 08/21/2022 HISTORY: ORDERING SYSTEM PROVIDED HISTORY: abnormal us, per Stephanie TECHNOLOGIST PROVIDED HISTORY: Reason for exam:->abnormal us, per Stephanie Reason for Exam: abnormal us, per Stephanie Acuity: Acute Type of Exam: Initial Relevant Medical/Surgical History: none FINDINGS: Liver size normal.  Patchy hepatic steatosis. No focal liver lesions although sensitivity is decreased in the absence of IV contrast.  The spleen, kidneys, adrenal glands show normal unenhanced signal.  Normal T1 and T2 signal of the pancreas. Subtle peripancreatic stranding evident on the comparison CT is manifested by traces of peripancreatic T2 hyperintensity representing edema and can be seen in the setting of acute pancreatitis. No peripancreatic fluid collections. Gallbladder: Cholelithiasis. No appreciable wall thickening. Bile Ducts: Intrahepatic biliary ducts not dilated. Mild dilatation of the common bile duct which measures 8 mm in diameter. No choledocholithiasis. Pancreatic Duct: Normal caliber pancreatic duct. No pancreas divisum. Other:  No free intraperitoneal fluid. Visualized GI tract unremarkable. Bone marrow signal age appropriate. Degenerative changes in the spine with some disc bulges at L1-L2 and L2-L3. 1. Limited evaluation for masses in the absence of IV contrast. 2. Patchy hepatic steatosis. 3. Cholelithiasis without MR evidence for acute cholecystitis. 4. Mild CBD dilatation measuring 8 mm. 5. Subtle peripancreatic edema which can be seen in the setting of acute pancreatitis. No peripancreatic fluid collections.      Us Abdomen Limited    Result Date: 8/21/2020  EXAMINATION: RIGHT UPPER QUADRANT ULTRASOUND 8/21/2020 1:02 pm COMPARISON: None HISTORY: ORDERING SYSTEM PROVIDED HISTORY: RUQ pain TECHNOLOGIST PROVIDED HISTORY: Reason for exam:->RUQ pain Reason for Exam: RUQ pain Acuity: Acute Type of Exam: Initial Additional signs and symptoms: nausea and emesis Relevant Medical/Surgical History: nk FINDINGS: LIVER:  There is coarse abnormal echotexture throughout liver. No focal liver lesions are identified. BILIARY SYSTEM:  Multiple gallstones are present. There is abnormal comet tail artifacts extending from the gallbladder wall which is mildly thickened. Sonographic Dunaway's sign was negative. The common bile duct is dilated measuring 9 mm. RIGHT KIDNEY: The right kidney is grossly unremarkable without evidence of hydronephrosis. PANCREAS:  Pancreatic duct is mildly dilated. Limited evaluation of the pancreas is otherwise unremarkable. OTHER: There is no free fluid identified. 1. Abnormal appearance of the gallbladder with cholelithiasis and mild gallbladder wall thickening. There are several comet tail artifacts, likely representing adenomyomatosis. Emphysematous cholecystitis is considered less likely however given the patient's symptoms, CT of the abdomen and pelvis with contrast is recommended for further evaluation. 2. Mild dilatation of the common bile duct and pancreatic duct. This can be further evaluated with CT of the abdomen and pelvis with contrast as well. The above findings were discussed with Alexia CHOWDARY at 1:50 p.m. on 08/21/2020. ED COURSE & MEDICAL DECISION MAKING     Patient presents as above. Emerge etiologies considered. Patient having episode nausea vomiting, back spasming since this morning, pain seems to be abnormal for patient. She denies any abdominal pain chest pain but does state that \"something just does not feel right\".   Is generally tender in the back but no significant abdominal tenderness, no alarming symptoms, denies chest pain shortness of breath. Otherwise vital signs are stable. Work-up initiated. Work-up demonstrating likely gallbladder pathology with cholelithiasis with possible obstruction versus acute cholecystitis. Patient has elevated liver function tests, pancreatic function. Ultrasound and CT scan demonstrating signs of gallbladder inflammation with cholelithiasis. Spoke with general surgery is recommending an MRCP for further evaluation for the possibility of biliary obstruction. Did also get GI involved, speak with Dr. Izzy Fraser. MRCP was ordered, patient remained stable. Patient's pain was well controlled, empiric antibiotics were initiated. Was admitted to the hospitalist team for further monitoring and likely evaluation by general surgery. In consideration of current COVID19 pandemic, with effort to minimize unnecessary provider exposure, this patient was seen at bedside by me independently. However, in compliance with current hospital KAYE/ED protocol, prior to admission I did discuss this patient case with emergency department physician, Dr. Radha Sánchez. .  Of note, this Pt was NOT admitted to the ICU. - I discussed Pt case with Hospitalist, who agrees to admit Pt -    Vital signs and nursing notes reviewed during ED course. Clinical  IMPRESSION    1. Calculus of gallbladder with acute cholecystitis and obstruction    2. Elevated lipase      Comment: Please note this report has been produced using speech recognition software and may contain errors related to that system including errors in grammar, punctuation, and spelling, as well as words and phrases that may be inappropriate. If there are any questions or concerns please feel free to contact the dictating provider for clarification.           Bartolo Enrique 411, PA  08/21/20 Trae Sung

## 2020-08-21 NOTE — ED NOTES
Called and gave report to Les Cross on [address]; pt will be going to room 506 [facility].  Jessenia CulpThe [facility] Foundation  08/21/20 1967

## 2020-08-21 NOTE — H&P
History and Physical      Name:  Juice Cordova /Age/Sex: 1955  (60 y.o. female)   MRN & CSN:  [id number] & [id number] Admission Date/Time: 2020 10:51 AM   Location:  ED03/ED-03 PCP: Cathi Canchola, APRN - CNP       Admitting Physicians : Dr. Umesh Kapadia is a 60 y.o.  female  who presents with Back Pain (\"up and down my back\", woke pt up out of sleep, no focal deficts)    Assessment and Plan:   Back pain due to acute pancreatitis due to colliculus of common bile duct  CT abd: Abnormal appearance of the gallbladder with cholelithiasis and mild gallbladder wall thickening.  There are several comet tail artifacts, likely representing adenomyomatosis. Mild dilatation of the common bile duct and pancreatic duct. ALT- 180, AST- 450,  bili 1.6, and lipase 1606  -Zosyn IV  -Pain control  -MRCP  -LFTs and lipase in the morning  -GI consult in ED    Arthritis  -Takes Naproxen    IBS  -Not currently on any medication    Obesity class I with BMI of 32.6  -Educated about lifestyle changes          DVT-PPX: Lovenox  Diet: N.p.o.      Medications:   Medications:    Infusions:    sodium chloride 100 mL/hr at 20 1506     PRN Meds: ondansetron, 4 mg, Q30 Min PRN        Current Facility-Administered Medications:     ondansetron (ZOFRAN) injection 4 mg, 4 mg, Intravenous, Q30 Min PRN, Render Fly Julita, PA, 4 mg at 20 1234    0.9 % sodium chloride infusion, , Intravenous, Continuous, Render Fly Julita, PA, Last Rate: 100 mL/hr at 20 1506    Current Outpatient Medications:     cyclobenzaprine (FLEXERIL) 10 MG tablet, Take 10 mg by mouth 3 times daily as needed for Muscle spasms. , Disp: , Rfl:     NONFORMULARY, Take 75 mg by mouth. \"Arthritis\" medicine., Disp: , Rfl:     docusate sodium (COLACE) 100 MG capsule, Take 100 mg by mouth 2 times daily as needed for Constipation. , Disp: , Rfl:     NONFORMULARY, Take  by mouth.  \"Medicine for Ulcer in Eye\", Disp: , Rfl:     History of normal  ENT:  Normocephalic, oral pharynx with moist mucus membranes  NECK:  Supple, symmetrical, trachea midline, no adenopathy,  LUNGS:  Clear to auscultate bilaterally, no rales ronchi or wheezing noted. CARDIOVASCULAR:  regular rate and rhythm, normal S1 and S2,no murmur noted, peripheral pulses 2+, no pitting edema  ABDOMEN: Normal BS, Mild tender, non distended, no HSM noted. Obese  MUSCULOSKELETAL:  ROM of all extremities grossly wnl  NEUROLOGIC: AOx 3,  Cranial nerves II-XII are grossly intact. Motor is 5 out of 5 bilaterally. Sensory is intact, no lateralizing findings. SKIN:  no bruising or bleeding, normal skin color, turgor, no redness, warmth, or swelling      Past Medical History:      Past Medical History:   Diagnosis Date    H/O arthritis     09- Patient reports arthritis in Knee    History of IBS     09- Patient told she has IBS     PSHX:  has a past surgical history that includes Colonoscopy (9-) and Hysterectomy. Allergies: Allergies   Allergen Reactions    Keflex [Cephalexin]        FAM HX: Cholecystectomy in both parents. Family history reviewed and is essentially negative unless as stated above.      Soc HX:   Social History     Socioeconomic History    Marital status: Single     Spouse name: None    Number of children: None    Years of education: None    Highest education level: None   Occupational History    None   Social Needs    Financial resource strain: None    Food insecurity     Worry: None     Inability: None    Transportation needs     Medical: None     Non-medical: None   Tobacco Use    Smoking status: Never Smoker   Substance and Sexual Activity    Alcohol use: Yes     Comment: rarely    Drug use: Yes     Types: Marijuana    Sexual activity: None   Lifestyle    Physical activity     Days per week: None     Minutes per session: None    Stress: None   Relationships    Social connections     Talks on phone: None     Gets together: None Attends [employer] service: None     Active member of club or organization: None     Attends meetings of clubs or organizations: None     Relationship status: None    Intimate partner violence     Fear of current or ex partner: None     Emotionally abused: None     Physically abused: None     Forced sexual activity: None   Other Topics Concern    None   Social History Narrative    None       Electronically signed by ALBERTO Handy CNP on 8/21/2020 at 4:09 PM

## 2020-08-21 NOTE — ED NOTES
1416 paged Dr Kendrick Sweet  08/21/20 1417  1418 Dr Richard Perez returned call     Nabila Andinoacher  08/21/20 1417

## 2020-08-22 ENCOUNTER — ANESTHESIA EVENT (OUTPATIENT)
Dept: OPERATING ROOM | Age: 65
DRG: 418 | End: 2020-08-22
Payer: MEDICARE

## 2020-08-22 ENCOUNTER — ANESTHESIA (OUTPATIENT)
Dept: OPERATING ROOM | Age: 65
DRG: 418 | End: 2020-08-22
Payer: MEDICARE

## 2020-08-22 VITALS
DIASTOLIC BLOOD PRESSURE: 86 MMHG | SYSTOLIC BLOOD PRESSURE: 129 MMHG | OXYGEN SATURATION: 96 % | RESPIRATION RATE: 18 BRPM | TEMPERATURE: 96.8 F

## 2020-08-22 LAB
ALBUMIN SERPL-MCNC: 3.5 GM/DL (ref 3.4–5)
ALP BLD-CCNC: 121 IU/L (ref 40–129)
ALT SERPL-CCNC: 166 U/L (ref 10–40)
AMYLASE: 352 U/L (ref 25–115)
AST SERPL-CCNC: 215 IU/L (ref 15–37)
BILIRUB SERPL-MCNC: 1.9 MG/DL (ref 0–1)
BILIRUBIN DIRECT: 1.4 MG/DL (ref 0–0.3)
BILIRUBIN, INDIRECT: 0.5 MG/DL (ref 0–0.7)
HCT VFR BLD CALC: 34.5 % (ref 37–47)
HEMOGLOBIN: 10.5 GM/DL (ref 12.5–16)
MCH RBC QN AUTO: 28.2 PG (ref 27–31)
MCHC RBC AUTO-ENTMCNC: 30.4 % (ref 32–36)
MCV RBC AUTO: 92.5 FL (ref 78–100)
PDW BLD-RTO: 13.4 % (ref 11.7–14.9)
PLATELET # BLD: 136 K/CU MM (ref 140–440)
PMV BLD AUTO: 9.9 FL (ref 7.5–11.1)
RBC # BLD: 3.73 M/CU MM (ref 4.2–5.4)
TOTAL PROTEIN: 5.5 GM/DL (ref 6.4–8.2)
TRIGL SERPL-MCNC: 32 MG/DL
WBC # BLD: 4.3 K/CU MM (ref 4–10.5)

## 2020-08-22 PROCEDURE — 2709999900 HC NON-CHARGEABLE SUPPLY: Performed by: SURGERY

## 2020-08-22 PROCEDURE — 0FT44ZZ RESECTION OF GALLBLADDER, PERCUTANEOUS ENDOSCOPIC APPROACH: ICD-10-PCS | Performed by: SURGERY

## 2020-08-22 PROCEDURE — 84478 ASSAY OF TRIGLYCERIDES: CPT

## 2020-08-22 PROCEDURE — 6360000002 HC RX W HCPCS: Performed by: ANESTHESIOLOGY

## 2020-08-22 PROCEDURE — 2500000003 HC RX 250 WO HCPCS: Performed by: SURGERY

## 2020-08-22 PROCEDURE — 3600000004 HC SURGERY LEVEL 4 BASE: Performed by: SURGERY

## 2020-08-22 PROCEDURE — 6360000002 HC RX W HCPCS: Performed by: HOSPITALIST

## 2020-08-22 PROCEDURE — 2500000003 HC RX 250 WO HCPCS: Performed by: NURSE ANESTHETIST, CERTIFIED REGISTERED

## 2020-08-22 PROCEDURE — 2580000003 HC RX 258: Performed by: NURSE ANESTHETIST, CERTIFIED REGISTERED

## 2020-08-22 PROCEDURE — 1200000000 HC SEMI PRIVATE

## 2020-08-22 PROCEDURE — 6360000002 HC RX W HCPCS: Performed by: NURSE ANESTHETIST, CERTIFIED REGISTERED

## 2020-08-22 PROCEDURE — 82150 ASSAY OF AMYLASE: CPT

## 2020-08-22 PROCEDURE — 6370000000 HC RX 637 (ALT 250 FOR IP): Performed by: NURSE PRACTITIONER

## 2020-08-22 PROCEDURE — 7100000001 HC PACU RECOVERY - ADDTL 15 MIN: Performed by: SURGERY

## 2020-08-22 PROCEDURE — 7100000000 HC PACU RECOVERY - FIRST 15 MIN: Performed by: SURGERY

## 2020-08-22 PROCEDURE — 6360000002 HC RX W HCPCS: Performed by: NURSE PRACTITIONER

## 2020-08-22 PROCEDURE — 99232 SBSQ HOSP IP/OBS MODERATE 35: CPT | Performed by: SURGERY

## 2020-08-22 PROCEDURE — 85027 COMPLETE CBC AUTOMATED: CPT

## 2020-08-22 PROCEDURE — 6370000000 HC RX 637 (ALT 250 FOR IP): Performed by: NURSE ANESTHETIST, CERTIFIED REGISTERED

## 2020-08-22 PROCEDURE — 94761 N-INVAS EAR/PLS OXIMETRY MLT: CPT

## 2020-08-22 PROCEDURE — 3700000000 HC ANESTHESIA ATTENDED CARE: Performed by: SURGERY

## 2020-08-22 PROCEDURE — 2580000003 HC RX 258: Performed by: NURSE PRACTITIONER

## 2020-08-22 PROCEDURE — 6370000000 HC RX 637 (ALT 250 FOR IP): Performed by: SURGERY

## 2020-08-22 PROCEDURE — 3600000014 HC SURGERY LEVEL 4 ADDTL 15MIN: Performed by: SURGERY

## 2020-08-22 PROCEDURE — 3700000001 HC ADD 15 MINUTES (ANESTHESIA): Performed by: SURGERY

## 2020-08-22 PROCEDURE — 2580000003 HC RX 258: Performed by: PHYSICIAN ASSISTANT

## 2020-08-22 PROCEDURE — 80076 HEPATIC FUNCTION PANEL: CPT

## 2020-08-22 PROCEDURE — 36415 COLL VENOUS BLD VENIPUNCTURE: CPT

## 2020-08-22 PROCEDURE — 47562 LAPAROSCOPIC CHOLECYSTECTOMY: CPT | Performed by: SURGERY

## 2020-08-22 RX ORDER — DOCUSATE SODIUM 100 MG/1
100 CAPSULE, LIQUID FILLED ORAL 2 TIMES DAILY PRN
Status: DISCONTINUED | OUTPATIENT
Start: 2020-08-22 | End: 2020-08-24 | Stop reason: HOSPADM

## 2020-08-22 RX ORDER — DEXAMETHASONE SODIUM PHOSPHATE 4 MG/ML
INJECTION, SOLUTION INTRA-ARTICULAR; INTRALESIONAL; INTRAMUSCULAR; INTRAVENOUS; SOFT TISSUE PRN
Status: DISCONTINUED | OUTPATIENT
Start: 2020-08-22 | End: 2020-08-22 | Stop reason: SDUPTHER

## 2020-08-22 RX ORDER — MIDAZOLAM HYDROCHLORIDE 1 MG/ML
INJECTION INTRAMUSCULAR; INTRAVENOUS PRN
Status: DISCONTINUED | OUTPATIENT
Start: 2020-08-22 | End: 2020-08-22 | Stop reason: SDUPTHER

## 2020-08-22 RX ORDER — ROCURONIUM BROMIDE 10 MG/ML
INJECTION, SOLUTION INTRAVENOUS PRN
Status: DISCONTINUED | OUTPATIENT
Start: 2020-08-22 | End: 2020-08-22 | Stop reason: SDUPTHER

## 2020-08-22 RX ORDER — FENTANYL CITRATE 50 UG/ML
50 INJECTION, SOLUTION INTRAMUSCULAR; INTRAVENOUS EVERY 5 MIN PRN
Status: DISCONTINUED | OUTPATIENT
Start: 2020-08-22 | End: 2020-08-22 | Stop reason: HOSPADM

## 2020-08-22 RX ORDER — LIDOCAINE HYDROCHLORIDE 20 MG/ML
INJECTION, SOLUTION INTRAVENOUS PRN
Status: DISCONTINUED | OUTPATIENT
Start: 2020-08-22 | End: 2020-08-22 | Stop reason: SDUPTHER

## 2020-08-22 RX ORDER — SODIUM CHLORIDE, SODIUM LACTATE, POTASSIUM CHLORIDE, CALCIUM CHLORIDE 600; 310; 30; 20 MG/100ML; MG/100ML; MG/100ML; MG/100ML
INJECTION, SOLUTION INTRAVENOUS CONTINUOUS PRN
Status: DISCONTINUED | OUTPATIENT
Start: 2020-08-22 | End: 2020-08-22 | Stop reason: SDUPTHER

## 2020-08-22 RX ORDER — PROMETHAZINE HYDROCHLORIDE 25 MG/ML
6.25 INJECTION, SOLUTION INTRAMUSCULAR; INTRAVENOUS
Status: DISCONTINUED | OUTPATIENT
Start: 2020-08-22 | End: 2020-08-22 | Stop reason: HOSPADM

## 2020-08-22 RX ORDER — LABETALOL HYDROCHLORIDE 5 MG/ML
5 INJECTION, SOLUTION INTRAVENOUS EVERY 10 MIN PRN
Status: DISCONTINUED | OUTPATIENT
Start: 2020-08-22 | End: 2020-08-22 | Stop reason: HOSPADM

## 2020-08-22 RX ORDER — BUPIVACAINE HYDROCHLORIDE 5 MG/ML
INJECTION, SOLUTION EPIDURAL; INTRACAUDAL
Status: COMPLETED | OUTPATIENT
Start: 2020-08-22 | End: 2020-08-22

## 2020-08-22 RX ORDER — CYCLOBENZAPRINE HCL 10 MG
10 TABLET ORAL 3 TIMES DAILY PRN
Status: DISCONTINUED | OUTPATIENT
Start: 2020-08-22 | End: 2020-08-24 | Stop reason: HOSPADM

## 2020-08-22 RX ORDER — ONDANSETRON 2 MG/ML
INJECTION INTRAMUSCULAR; INTRAVENOUS PRN
Status: DISCONTINUED | OUTPATIENT
Start: 2020-08-22 | End: 2020-08-22 | Stop reason: SDUPTHER

## 2020-08-22 RX ORDER — PROPOFOL 10 MG/ML
INJECTION, EMULSION INTRAVENOUS PRN
Status: DISCONTINUED | OUTPATIENT
Start: 2020-08-22 | End: 2020-08-22 | Stop reason: SDUPTHER

## 2020-08-22 RX ORDER — FENTANYL CITRATE 50 UG/ML
25 INJECTION, SOLUTION INTRAMUSCULAR; INTRAVENOUS EVERY 5 MIN PRN
Status: DISCONTINUED | OUTPATIENT
Start: 2020-08-22 | End: 2020-08-22 | Stop reason: HOSPADM

## 2020-08-22 RX ORDER — HYDROMORPHONE HCL 110MG/55ML
0.5 PATIENT CONTROLLED ANALGESIA SYRINGE INTRAVENOUS EVERY 5 MIN PRN
Status: DISCONTINUED | OUTPATIENT
Start: 2020-08-22 | End: 2020-08-22 | Stop reason: HOSPADM

## 2020-08-22 RX ORDER — FENTANYL CITRATE 50 UG/ML
INJECTION, SOLUTION INTRAMUSCULAR; INTRAVENOUS PRN
Status: DISCONTINUED | OUTPATIENT
Start: 2020-08-22 | End: 2020-08-22 | Stop reason: SDUPTHER

## 2020-08-22 RX ORDER — BUPROPION HYDROCHLORIDE 150 MG/1
300 TABLET ORAL EVERY MORNING
Status: DISCONTINUED | OUTPATIENT
Start: 2020-08-22 | End: 2020-08-24 | Stop reason: HOSPADM

## 2020-08-22 RX ORDER — MORPHINE SULFATE 2 MG/ML
2 INJECTION, SOLUTION INTRAMUSCULAR; INTRAVENOUS EVERY 4 HOURS PRN
Status: DISCONTINUED | OUTPATIENT
Start: 2020-08-22 | End: 2020-08-24 | Stop reason: HOSPADM

## 2020-08-22 RX ORDER — SCOLOPAMINE TRANSDERMAL SYSTEM 1 MG/1
PATCH, EXTENDED RELEASE TRANSDERMAL PRN
Status: DISCONTINUED | OUTPATIENT
Start: 2020-08-22 | End: 2020-08-22 | Stop reason: SDUPTHER

## 2020-08-22 RX ORDER — HYDROMORPHONE HCL 110MG/55ML
0.25 PATIENT CONTROLLED ANALGESIA SYRINGE INTRAVENOUS EVERY 5 MIN PRN
Status: DISCONTINUED | OUTPATIENT
Start: 2020-08-22 | End: 2020-08-22 | Stop reason: HOSPADM

## 2020-08-22 RX ORDER — HYDRALAZINE HYDROCHLORIDE 20 MG/ML
5 INJECTION INTRAMUSCULAR; INTRAVENOUS EVERY 10 MIN PRN
Status: DISCONTINUED | OUTPATIENT
Start: 2020-08-22 | End: 2020-08-22 | Stop reason: HOSPADM

## 2020-08-22 RX ORDER — CETIRIZINE HYDROCHLORIDE 10 MG/1
10 TABLET ORAL DAILY
Status: DISCONTINUED | OUTPATIENT
Start: 2020-08-22 | End: 2020-08-24 | Stop reason: HOSPADM

## 2020-08-22 RX ORDER — ONDANSETRON 2 MG/ML
4 INJECTION INTRAMUSCULAR; INTRAVENOUS
Status: COMPLETED | OUTPATIENT
Start: 2020-08-22 | End: 2020-08-22

## 2020-08-22 RX ORDER — ACYCLOVIR 200 MG/1
200 CAPSULE ORAL 2 TIMES DAILY
Status: DISCONTINUED | OUTPATIENT
Start: 2020-08-22 | End: 2020-08-24 | Stop reason: HOSPADM

## 2020-08-22 RX ADMIN — PROPOFOL 150 MG: 10 INJECTION, EMULSION INTRAVENOUS at 10:00

## 2020-08-22 RX ADMIN — ROCURONIUM BROMIDE 50 MG: 10 INJECTION INTRAVENOUS at 10:00

## 2020-08-22 RX ADMIN — LIDOCAINE HYDROCHLORIDE 100 MG: 20 INJECTION, SOLUTION INTRAVENOUS at 10:00

## 2020-08-22 RX ADMIN — MORPHINE SULFATE 2 MG: 2 INJECTION, SOLUTION INTRAMUSCULAR; INTRAVENOUS at 18:59

## 2020-08-22 RX ADMIN — SODIUM CHLORIDE, POTASSIUM CHLORIDE, SODIUM LACTATE AND CALCIUM CHLORIDE: 600; 310; 30; 20 INJECTION, SOLUTION INTRAVENOUS at 11:08

## 2020-08-22 RX ADMIN — SCOPOLAMINE 1 PATCH: 1 PATCH, EXTENDED RELEASE TRANSDERMAL at 09:53

## 2020-08-22 RX ADMIN — ONDANSETRON 4 MG: 2 INJECTION INTRAMUSCULAR; INTRAVENOUS at 11:45

## 2020-08-22 RX ADMIN — SODIUM CHLORIDE, POTASSIUM CHLORIDE, SODIUM LACTATE AND CALCIUM CHLORIDE: 600; 310; 30; 20 INJECTION, SOLUTION INTRAVENOUS at 05:54

## 2020-08-22 RX ADMIN — DEXAMETHASONE SODIUM PHOSPHATE 8 MG: 4 INJECTION, SOLUTION INTRAMUSCULAR; INTRAVENOUS at 10:05

## 2020-08-22 RX ADMIN — MIDAZOLAM 2 MG: 1 INJECTION INTRAMUSCULAR; INTRAVENOUS at 09:52

## 2020-08-22 RX ADMIN — FENTANYL CITRATE 50 MCG: 50 INJECTION INTRAMUSCULAR; INTRAVENOUS at 10:38

## 2020-08-22 RX ADMIN — FENTANYL CITRATE 100 MCG: 50 INJECTION INTRAMUSCULAR; INTRAVENOUS at 10:00

## 2020-08-22 RX ADMIN — PIPERACILLIN AND TAZOBACTAM 3.38 G: 3; .375 INJECTION, POWDER, FOR SOLUTION INTRAVENOUS at 14:52

## 2020-08-22 RX ADMIN — FENTANYL CITRATE 50 MCG: 50 INJECTION, SOLUTION INTRAMUSCULAR; INTRAVENOUS at 11:47

## 2020-08-22 RX ADMIN — PIPERACILLIN AND TAZOBACTAM 3.38 G: 3; .375 INJECTION, POWDER, FOR SOLUTION INTRAVENOUS at 05:54

## 2020-08-22 RX ADMIN — ONDANSETRON 4 MG: 2 INJECTION INTRAMUSCULAR; INTRAVENOUS at 11:06

## 2020-08-22 RX ADMIN — PIPERACILLIN AND TAZOBACTAM 3.38 G: 3; .375 INJECTION, POWDER, FOR SOLUTION INTRAVENOUS at 20:41

## 2020-08-22 RX ADMIN — MORPHINE SULFATE 2 MG: 2 INJECTION, SOLUTION INTRAMUSCULAR; INTRAVENOUS at 14:20

## 2020-08-22 RX ADMIN — FENTANYL CITRATE 50 MCG: 50 INJECTION INTRAMUSCULAR; INTRAVENOUS at 11:06

## 2020-08-22 RX ADMIN — SODIUM CHLORIDE: 9 INJECTION, SOLUTION INTRAVENOUS at 02:33

## 2020-08-22 RX ADMIN — SODIUM CHLORIDE, POTASSIUM CHLORIDE, SODIUM LACTATE AND CALCIUM CHLORIDE: 600; 310; 30; 20 INJECTION, SOLUTION INTRAVENOUS at 09:53

## 2020-08-22 RX ADMIN — SUGAMMADEX 200 MG: 100 INJECTION, SOLUTION INTRAVENOUS at 11:06

## 2020-08-22 RX ADMIN — ACYCLOVIR 200 MG: 200 CAPSULE ORAL at 20:41

## 2020-08-22 ASSESSMENT — PULMONARY FUNCTION TESTS
PIF_VALUE: 34
PIF_VALUE: 33
PIF_VALUE: 25
PIF_VALUE: 34
PIF_VALUE: 4
PIF_VALUE: 31
PIF_VALUE: 25
PIF_VALUE: 32
PIF_VALUE: 0
PIF_VALUE: 0
PIF_VALUE: 25
PIF_VALUE: 33
PIF_VALUE: 32
PIF_VALUE: 22
PIF_VALUE: 32
PIF_VALUE: 31
PIF_VALUE: 27
PIF_VALUE: 31
PIF_VALUE: 25
PIF_VALUE: 34
PIF_VALUE: 11
PIF_VALUE: 34
PIF_VALUE: 34
PIF_VALUE: 26
PIF_VALUE: 32
PIF_VALUE: 33
PIF_VALUE: 25
PIF_VALUE: 31
PIF_VALUE: 30
PIF_VALUE: 26
PIF_VALUE: 31
PIF_VALUE: 0
PIF_VALUE: 1
PIF_VALUE: 7
PIF_VALUE: 32
PIF_VALUE: 26
PIF_VALUE: 25
PIF_VALUE: 32
PIF_VALUE: 34
PIF_VALUE: 34
PIF_VALUE: 31
PIF_VALUE: 0
PIF_VALUE: 12
PIF_VALUE: 25
PIF_VALUE: 31
PIF_VALUE: 32
PIF_VALUE: 31
PIF_VALUE: 33
PIF_VALUE: 33
PIF_VALUE: 31
PIF_VALUE: 33
PIF_VALUE: 26
PIF_VALUE: 25
PIF_VALUE: 25
PIF_VALUE: 27
PIF_VALUE: 0
PIF_VALUE: 31
PIF_VALUE: 31
PIF_VALUE: 27
PIF_VALUE: 31
PIF_VALUE: 32
PIF_VALUE: 33
PIF_VALUE: 26
PIF_VALUE: 26
PIF_VALUE: 27
PIF_VALUE: 31
PIF_VALUE: 30
PIF_VALUE: 33
PIF_VALUE: 0
PIF_VALUE: 32
PIF_VALUE: 20
PIF_VALUE: 25
PIF_VALUE: 34
PIF_VALUE: 2
PIF_VALUE: 12
PIF_VALUE: 33
PIF_VALUE: 29
PIF_VALUE: 31
PIF_VALUE: 32
PIF_VALUE: 26
PIF_VALUE: 26
PIF_VALUE: 0
PIF_VALUE: 0
PIF_VALUE: 31
PIF_VALUE: 33

## 2020-08-22 ASSESSMENT — PAIN DESCRIPTION - PAIN TYPE
TYPE: SURGICAL PAIN

## 2020-08-22 ASSESSMENT — PAIN SCALES - GENERAL
PAINLEVEL_OUTOF10: 10
PAINLEVEL_OUTOF10: 10
PAINLEVEL_OUTOF10: 8
PAINLEVEL_OUTOF10: 9
PAINLEVEL_OUTOF10: 8
PAINLEVEL_OUTOF10: 10

## 2020-08-22 ASSESSMENT — PAIN DESCRIPTION - LOCATION
LOCATION: ABDOMEN

## 2020-08-22 ASSESSMENT — PAIN DESCRIPTION - DESCRIPTORS
DESCRIPTORS: ACHING;CONSTANT;CRAMPING
DESCRIPTORS: ACHING;CRAMPING;CONSTANT
DESCRIPTORS: ACHING;CRAMPING;DISCOMFORT
DESCRIPTORS: ACHING;CONSTANT

## 2020-08-22 ASSESSMENT — PAIN DESCRIPTION - ORIENTATION
ORIENTATION: MID

## 2020-08-22 ASSESSMENT — PAIN DESCRIPTION - FREQUENCY
FREQUENCY: CONTINUOUS

## 2020-08-22 ASSESSMENT — LIFESTYLE VARIABLES: SMOKING_STATUS: 0

## 2020-08-22 NOTE — OP NOTE
Operative Note      Patient: Danilo Tse  YOB: 1955  MRN: [id number]    Date of Procedure: 8/22/2020    Pre-Op Diagnosis: gallstone pancreatitis    Post-Op Diagnosis: Same       Procedure(s):  CHOLECYSTECTOMY LAPAROSCOPIC    Surgeon(s):  Maritza Starr MD    Assistant:   * No surgical staff found *    Anesthesia: General    Estimated Blood Loss (mL): 038     Complications: None    Specimens:   ID Type Source Tests Collected by Time Destination   A : GALLBLADDER WITH CONTENTS  Tissue Gallbladder SURGICAL PATHOLOGY Maritza Starr MD 8/22/2020 1033        Implants:  * No implants in log *      Drains: * No LDAs found *    Findings: distended gallbadder, minimally inflamed, oozy from port sites and liver bed    Detailed Description of Procedure: The patient was met in the pre-operative holding area. An informed consent was obtained after discussing the risks, benefits, complications, treatment options, and expected outcomes. The risks discussed included: adverse reaction to medication, aspiration, injury to abdominal organs or biliary structures, bleeding, infection, the need for additional procedures or the possible need to convert to an open procedure. The patient and/or family concurred with the proposed plan, giving informed consent. The patient was transported to the operating room. Once in the operating room, the patient was transferred onto the operating room table and placed in the supine position. The patient was secured to the operating room table with multiple straps. All pressure points were padded appropriately. General anesthesia was induced and tolerated without incident. The patient's abdomen was prepped and draped in the standard, sterile fashion. Antibiotic prophylaxis was administered in accordance with national protocol. A time-out was held with all members of the operating room team present and in agreement.     Local anesthetic was injected into the maya-umbilical skin and an incision was made. A Kocher clamp was used to grasp the umbilical fascia and elevate it while a Veress needle was placed. The needle was aspirated and drop test was performed and the abdomen was insufflated to 15 mmHg. Using a 5 mm optical trocar, the peritoneum was entered without incidence or damage to nearby structures. 3 additional trocars were introduced under direct vision. All skin incisions were infiltrated with local anesthetic prior to making the incisions and placing the trocars. She was abnormally oozy from the port sites. The patient was then positioned in reverse trendelenburg with the right side up. The gallbladder was identified, the fundus grasped, and retracted cephalad. The gallbladder was distended but not overly inflamed. Adhesions were taken down with a combination of blunt dissection and with electrocautery, taking care not to injure any adjacent organs or viscus. The infundibulum was grasped and retracted laterally, exposing the peritoneum overlying the triangle of Calot. This peritoneum was divided and exposed in a blunt fashion. The cystic duct was clearly identified and bluntly dissected circumferentially. The junctions of the gallbladder and cystic duct were clearly identified. The cystic artery was clearly identified in a similar fashion. At the point, the critical view of safety was accomplished: the hepatocystic triangle was cleared of fat and fibrous tissue, the lower one third of the gallbladder was  from the liver to expose the cystic plate, and two and only two structures were seen entering the gallbladder. Surgical clips were applied to the cystic duct and cystic artery (two on the stay side and one on the specimen side for each structure) and the cystic duct and cystic artery were ligated with Endo Mamta. The gallbladder was dissected from the liver bed in retrograde fashion with the electrocautery.  An Endo Catch specimen retrieval bag was introduced into the patient's abdomen and the gallbladder was placed into the bag. The gallbladder was removed from the patient's abdomen under direct vision. The liver bed was inspected. There was superficial oozing from the liver bed in multiple areas. The bovie was set to spray and the bleeding areas were cauterized. This greatly decreased the bleeding. Surgicel snow was placed  Hemostasis was appropriate. Any spilled blood or bile was removed with suction . The port site the gallbladder was removed through was closed using a suture passer and 0 vicryl suture. Pneumoperitoneum was desufflated after viewing removal of the remaining trocars under direct vision. The wounds were then closed with absorbable suture. Dermabond and sterile dressings were applied. At the end of the case, instrument counts, sponge counts, and needle counts were correct. At the end of the case, the patient was extubated and transferred to the PACU in stable condition.       Electronically signed by David Sosa MD on 8/22/2020 at 11:19 AM

## 2020-08-22 NOTE — ANESTHESIA PRE PROCEDURE
Department of Anesthesiology  Preprocedure Note       Name:  Wing Nageotte   Age:  60 y.o.  :  1955                                          MRN:  [id number]         Date:  2020      Surgeon: Mónica Duran):  Chelsie Juarez MD    Procedure: Procedure(s):  CHOLECYSTECTOMY LAPAROSCOPIC    Medications prior to admission:   Prior to Admission medications    Medication Sig Start Date End Date Taking? Authorizing Provider   naproxen sodium (ANAPROX) 550 MG tablet Take 550 mg by mouth 2 times daily (with meals)   Yes Historical Provider, MD   acyclovir (ZOVIRAX) 800 MG tablet Take 200 mg by mouth 2 times daily   Yes Historical Provider, MD   buPROPion (WELLBUTRIN XL) 300 MG extended release tablet Take 300 mg by mouth every morning   Yes Historical Provider, MD   loratadine (CLARITIN) 10 MG capsule Take 10 mg by mouth daily   Yes Historical Provider, MD   cyclobenzaprine (FLEXERIL) 10 MG tablet Take 10 mg by mouth 3 times daily as needed for Muscle spasms. Yes Historical Provider, MD   docusate sodium (COLACE) 100 MG capsule Take 100 mg by mouth 2 times daily as needed for Constipation.     Historical Provider, MD       Current medications:    Current Facility-Administered Medications   Medication Dose Route Frequency Provider Last Rate Last Dose    buPROPion (WELLBUTRIN XL) extended release tablet 300 mg  300 mg Oral QAM Shan Free, APRN - CNP        acyclovir (ZOVIRAX) tablet 400 mg  400 mg Oral BID Shan Free, APRN - CNP        cyclobenzaprine (FLEXERIL) tablet 10 mg  10 mg Oral TID PRN Shan Free, APRN - CNP        docusate sodium (COLACE) capsule 100 mg  100 mg Oral BID PRN Shan Free, APRN - CNP        cetirizine (ZYRTEC) tablet 10 mg  10 mg Oral Daily Shan Free, APRN - CNP        ondansetron (ZOFRAN) injection 4 mg  4 mg Intravenous Q30 Min PRN RICARDA Reyez   4 mg at 20 1541    lactated ringers infusion   Intravenous Continuous Shan Free, APRN -  mL/hr at 08/22/20 0554      sodium chloride flush 0.9 % injection 10 mL  10 mL Intravenous 2 times per day Bagwell Gess, APRN - CNP        sodium chloride flush 0.9 % injection 10 mL  10 mL Intravenous PRN Maylin Gess, APRN - CNP        acetaminophen (TYLENOL) tablet 650 mg  650 mg Oral Q4H PRN Bagwell Gess, APRN - CNP        promethazine (PHENERGAN) tablet 12.5 mg  12.5 mg Oral Q6H PRN Bagwell Gess, APRN - CNP        Or    ondansetron (ZOFRAN) injection 4 mg  4 mg Intravenous Q6H PRN Bagwell Gess, APRN - CNP        enoxaparin (LOVENOX) injection 40 mg  40 mg Subcutaneous Daily Bagwell Gess, APRN - CNP        0.9 % sodium chloride infusion   Intravenous Continuous [employer] [place], PA   Stopped at 08/22/20 0553    piperacillin-tazobactam (ZOSYN) 3.375 g in dextrose 5 % 50 mL IVPB extended infusion (mini-bag)  3.375 g Intravenous Q8H Karlene Sibomana, APRN - CNP 12.5 mL/hr at 08/22/20 0554 3.375 g at 08/22/20 0554    ketorolac (TORADOL) injection 15 mg  15 mg Intravenous Q6H PRN Maylin Gess, APRN - CNP        morphine (PF) injection 2 mg  2 mg Intravenous Q4H PRN Maylin Gess, APRN - CNP           Allergies:     Allergies   Allergen Reactions    Keflex [Cephalexin]        Problem List:    Patient Active Problem List   Diagnosis Code    Acute pancreatitis due to calculus of common bile duct K85.10       Past Medical History:        Diagnosis Date    H/O arthritis     09- Patient reports arthritis in Knee    History of IBS     09- Patient told she has IBS       Past Surgical History:        Procedure Laterality Date    COLONOSCOPY  9-    HYSTERECTOMY      total       Social History:    Social History     Tobacco Use    Smoking status: Never Smoker   Substance Use Topics    Alcohol use: Yes     Comment: rarely                                Counseling given: Not Answered      Vital Signs (Current):   Vitals:    08/21/20 1713 08/21/20 2146 08/22/20 0256 08/22/20 0730   BP:  122/66 123/77    Pulse: 90 87 84    Resp:  15 16    Temp:  98.3 °F (36.8 °C) 98.4 °F (36.9 °C)    TempSrc:  Oral Oral    SpO2:  97%  97%   Weight:   228 lb (103.4 kg)    Height:                                                  BP Readings from Last 3 Encounters:   08/22/20 123/77   03/03/20 138/82   06/25/16 115/87       NPO Status:                                                                                 BMI:   Wt Readings from Last 3 Encounters:   08/22/20 228 lb (103.4 kg)   03/03/20 232 lb (105.2 kg)   06/25/16 230 lb (104.3 kg)     Body mass index is 34.67 kg/m². CBC:   Lab Results   Component Value Date    WBC 4.3 08/22/2020    RBC 3.73 08/22/2020    HGB 10.5 08/22/2020    HCT 34.5 08/22/2020    MCV 92.5 08/22/2020    RDW 13.4 08/22/2020     08/22/2020       CMP:   Lab Results   Component Value Date     08/21/2020    K 4.1 08/21/2020     08/21/2020    CO2 28 08/21/2020    BUN 15 08/21/2020    CREATININE 0.8 08/21/2020    GFRAA >60 08/21/2020    LABGLOM >60 08/21/2020    GLUCOSE 105 08/21/2020    PROT 5.5 08/22/2020    PROT 7.0 07/29/2012    CALCIUM 9.0 08/21/2020    BILITOT 1.9 08/22/2020    ALKPHOS 121 08/22/2020     08/22/2020     08/22/2020       POC Tests: No results for input(s): POCGLU, POCNA, POCK, POCCL, POCBUN, POCHEMO, POCHCT in the last 72 hours. Coags: No results found for: PROTIME, INR, APTT    HCG (If Applicable): No results found for: PREGTESTUR, PREGSERUM, HCG, HCGQUANT     ABGs: No results found for: PHART, PO2ART, RVJ7RFU, OAB3DKN, BEART, K5OLVGQR     Type & Screen (If Applicable):  No results found for: LABABO, LABRH    Drug/Infectious Status (If Applicable):  No results found for: HIV, HEPCAB    COVID-19 Screening (If Applicable): No results found for: COVID19      Anesthesia Evaluation  Patient summary reviewed and Nursing notes reviewed   history of anesthetic complications: PONV.   Airway: Mallampati: II  TM distance: >3 FB   Neck ROM: full  Mouth opening: > = 3 FB Dental: normal exam         Pulmonary:       (-) COPD, asthma, sleep apnea and not a current smoker          Patient did not smoke on day of surgery. Cardiovascular:  Exercise tolerance: good (>4 METS),       (-) hypertension, past MI and CAD       Beta Blocker:  Not on Beta Blocker         Neuro/Psych:      (-) seizures and CVA           GI/Hepatic/Renal:        (-) GERD       Endo/Other:        (-) diabetes mellitus               Abdominal:           Vascular:                                        Anesthesia Plan      general     ASA 2       Induction: intravenous. Anesthetic plan and risks discussed with patient. Plan discussed with CRNA and attending.     Attending anesthesiologist reviewed and agrees with Pre Eval content              Larayne Scheuermann, MD   8/22/2020

## 2020-08-22 NOTE — PROGRESS NOTES
GENERAL SURGERY PROGRESS NOTE    Nyla Dawson is a 60 y.o. female with gallstone pancreatitis. Subjective:  Back pain improving. Still with some dry heaves. Denies significant abdominal pain. LFT on the downtrend with the exception of bilirubin that is 1.9 today. Objective:    Vitals: VITALS:  /77   Pulse 84   Temp 98.4 °F (36.9 °C) (Oral)   Resp 16   Ht 5' 8\" (1.727 m)   Wt 228 lb (103.4 kg)   SpO2 97%   BMI 34.67 kg/m²     I/O: No intake/output data recorded. Labs/Imaging Results:   Lab Results   Component Value Date     2020    K 4.1 2020     2020    CO2 28 2020    BUN 15 2020    CREATININE 0.8 2020    GLUCOSE 105 2020    CALCIUM 9.0 2020      Lab Results   Component Value Date    WBC 4.3 2020    HGB 10.5 (L) 2020    HCT 34.5 (L) 2020    MCV 92.5 2020     (L) 2020     Lab Results   Component Value Date     (H) 2020     (H) 2020    ALKPHOS 121 2020    BILITOT 1.9 (H) 2020         IV Fluids: [] lactated ringers **FOLLOWED BY** lactated ringers Last Rate: 150 mL/hr at 20 0554    sodium chloride Last Rate: Stopped (20 0553)    Scheduled Meds: sodium chloride flush, 10 mL, Intravenous, 2 times per day    enoxaparin, 40 mg, Subcutaneous, Daily    piperacillin-tazobactam, 3.375 g, Intravenous, Q8H    Physical Exam:  General: A&O x 3, no distress. HEENT: Anicteric sclerae, MMM. Extremities: No edema bilat LE. Abdomen: Soft, nontender, nondistended. Assessment and Plan:  60 y.o. female with gallstone pancreatitis. I discussed her case with Dr. Isaak Meneses this morning. We agree that it is safe to proceed with laparoscopic cholecystectomy today to prevent recurrent gallstone pancreatitis in the future.     Patient Active Problem List:     Acute pancreatitis due to calculus of common bile duct      - plan for laparoscopic

## 2020-08-22 NOTE — ANESTHESIA POSTPROCEDURE EVALUATION
Department of Anesthesiology  Postprocedure Note    Patient: Gisele Steve  MRN: [id number]  YOB: 1955  Date of evaluation: 8/22/2020  Time:  11:28 AM     Procedure Summary     Date:  08/22/20 Room / Location:  [address]    Anesthesia Start:  5851 Anesthesia Stop:  1128    Procedure:  CHOLECYSTECTOMY LAPAROSCOPIC (N/A Abdomen) Diagnosis:  (acute cholecystitis)    Surgeon:  Lucita Bryant MD Responsible Provider:      Anesthesia Type:  general ASA Status:  2          Anesthesia Type: general    Ramon Phase I:      Ramon Phase II:      Last vitals: Reviewed and per EMR flowsheets.        Anesthesia Post Evaluation    Patient location during evaluation: PACU  Patient participation: complete - patient participated  Level of consciousness: awake and alert  Pain score: 0  Airway patency: patent  Nausea & Vomiting: no nausea and no vomiting  Complications: no  Cardiovascular status: blood pressure returned to baseline  Respiratory status: acceptable, nasal cannula, spontaneous ventilation and nonlabored ventilation

## 2020-08-22 NOTE — CONSULTS
[address] [address] [facility], [address] [facility]                                  CONSULTATION    PATIENT NAME: Young Brown                    :        1955  MED REC NO:   [id number]                          ROOM:  ACCOUNT NO:   [de-identified]                           ADMIT DATE: 2020  PROVIDER:     Kim Alvarez MD    CONSULT DATE:  2020    PRIMARY CARE PROVIDER:  Gosia Hernandez NP at [facility]. PREOP DIAGNOSES:  Upper abdominal pain with abnormal LFTs and imaging,  rule out common bile duct stone. HISTORY OF PRESENT ILLNESS:  The patient is a 37-year-old white female  with past medical history significant for IBS and osteoarthritis, on  NSAIDs, who presented to the emergency room yesterday with acute onset  of upper abdominal pain radiating to the back along with nausea and  vomiting x1. There is no history of fever, chills, melena,  hematochezia, or weight loss. In the ER, The patient had a blood workup  done comprising a chem profile, which was unremarkable. The patient's  LFTs were abnormal with a total bilirubin of 1.6, AST of 450, ALT of  180, and alkaline phosphatase 141. Serum lipase was elevated at 1606. CBC was unremarkable. Ultrasound of the right upper quadrant was done,  which showed multiple gallstones, and the common bile duct was dilated  at 9 mm. Subsequently, CAT scan of the abdomen and pelvis was done. No  definite gallstones were seen, but the common bile duct was dilated and  findings consistent with acute pancreatitis were noted. Finally, MRCP  was done and there was no evidence of common bile duct stone. The  patient was admitted for further workup and management. The patient is on IV fluids along with IV antibiotics. The patient has  never had an EGD done, but did have a colonoscopy done by Dr. Matias Delgadillo  on 2014. The patient is due for a followup colonoscopy. The  patient is hemodynamically stable. REVIEW OF SYSTEMS:  CENTRAL NERVOUS SYSTEM:  The patient denies headache or focal  sensorimotor symptoms. CARDIOVASCULAR SYSTEM:  No history of chest pain, shortness of breath,  or leg swelling. GENITOURINARY SYSTEM:  No history of dysuria, pyuria, or hematuria. MUSCULOSKELETAL SYSTEM:  The patient complains of generalized aches and  pain. RESPIRATORY SYSTEM:  No history of cough, hemoptysis, fever, or chills. PAST MEDICAL HISTORY:  Significant for history of osteoarthritis and  IBS. FAMILY HISTORY:  The patient's mother and father both were diagnosed  with carcinoma of the lung. MEDICATIONS:  Please refer to the chart. SOCIOECONOMIC HISTORY:  The patient does not smoke cigarettes. There is  history of EtOH use and the patient also uses recreational drugs, smokes  pot. ALLERGIES:  The patient is allergic KEFLEX. PHYSICAL EXAMINATION:  GENERAL:  Shows a 60-year-old white female of average build and  nutritional status, who is lying comfortably flat in bed, in no acute  distress. She is awake, alert, and oriented and pleasant to talk with. VITAL SIGNS:  Stable. HEENT:  Shows skull to be atraumatic. NECK:  Supple. CHEST:  Clear. HEART:  S1 and S2 are normal.  ABDOMEN:  Soft, nondistended with mild tenderness in the upper abdomen. No guarding or rigidity. Liver and spleen are not palpable. Bowel  sounds are present. RECTAL:  Deferred. CNS:  Shows the patient to be awake, alert, and oriented. There are no  focal sensorimotor signs. MUSCULOSKELETAL SYSTEM:  Unremarkable. LABORATORY DATA:  As above mentioned. IMPRESSION:  3  A 60-year-old white female presents with acute onset of upper  abdominal pain radiating to the back along with abnormal LFTs and  imaging, rule out acute gallstone pancreatitis. 2.  Normal MRCP with no evidence of common bile duct stone.   3.  The patient needs a followup colonoscopy as an outpatient. RECOMMENDATIONS:  1. Agree with present management with IV fluids along with IV  antibiotics. 2.  We will monitor the patient's CBC, chem profile, amylase, and lipase  level. 3.  Lap cholecystectomy per Dr. Jonathan Moore. 4.  No need for ERCP since MRCP is negative for common bile duct stones. 5.  The patient has been instructed to call the office after discharge  for outpatient followup colonoscopy. 6.  The case and plan have been discussed in detail with the patient.         Beulah Dee MD    D: 08/22/2020 7:45:17       T: 08/22/2020 8:39:14     [email or web address]  Job#: [id number]     Doc#: [id number]    CC:  David Farr Np

## 2020-08-22 NOTE — PROGRESS NOTES
Physician Progress Note      Cristhian Farrar  [facility] #:                  [id number]  :                       1955  ADMIT DATE:       2020 10:51 AM  DISCH DATE:  RESPONDING  PROVIDER #:        Nancy VENTURA MD          QUERY TEXT:    Dear hospitalist,    Pt admitted with pancreatitis. Pt noted to have abnormal H&H. If possible,   please document in the progress notes and discharge summary if you are   evaluating and/or treating any of the following: The medical record reflects the following:  Risk Factors: Episodes of Vomiting. Patient take Naprosyn for chronic back   pain with risk of bleeding. Clinical Indicators: H&H on admission: 12.0, 38.5 dropped to 10.5, 34.5 in   less than 24 hours. Treatment: admission, monitor, GI consult, PT/INR, CBC daily,  Options provided:  -- Acute blood loss anemia  -- Chronic blood loss anemia  -- Iron deficiency anemia  -- Anemia of chronic disease due to pancreatitis  -- Dilutional anemia  -- Other - I will add my own diagnosis  -- Disagree - Not applicable / Not valid  -- Disagree - Clinically unable to determine / Unknown  -- Refer to Clinical Documentation Reviewer    PROVIDER RESPONSE TEXT:    This patient has anemia of chronic disease due to pancreatitis.     Query created by: Quincy Velarde on 2020 7:56 AM      Electronically signed by:  Silvia Byers MD 2020 8:39 AM

## 2020-08-22 NOTE — PROGRESS NOTES
1120 - transferred from OR on bed, monitor applied, alarms on and verified, bedside handoff provided by Mary Hui and Deon Anderson  1145 - medicated for complaint of nausea and dry heaving  1146 - medicated for complaint of surgical abdominal pain/discomfort  1200 - patient turned, repositioned, and linens changed: tolerated well  1250 - report called to Dennis  3590 - phase one care complete  1258 - transferred to [phone] - bedside handoff provided to Dennis

## 2020-08-22 NOTE — PROGRESS NOTES
[address] [place] [address]  HOSPITALIST PROGRESS NOTE                       Name:  Alphonso Allan /Age/Sex: 1955  (60 y.o. female)   MRN & CSN:  [id number] & [id number] Admission Date/Time: 2020 10:51 AM   Location:  [address] [place], [place] [address] Attending:  Neda Landa MD                                                  HPI  Alphonso Allan is a 60 y.o. female who presents with back pain    SUBJECTIVE  Seen after surgery, reports abdominal pain and not feeling good. 10 point review of systems reviewed and negative unless noted above. ALLERGIES:   Allergies   Allergen Reactions    Keflex [Cephalexin]        PCP: ALBERTO Allen CNP    PAST MEDICAL HISTORY, SURGICAL HISTORY, SOCIAL HISTORY and  HOME MEDICATIONS all reviewed. OBJECTIVE  Vitals:    20 1215 20 1230 20 1245 20 1315   BP: 138/85 (!) 119/102 (!) 124/111 107/72   Pulse: 90 91 93 96   Resp: 15 18 21 23   Temp: 97.3 °F (36.3 °C)   97.9 °F (36.6 °C)   TempSrc: Temporal   Oral   SpO2: 93% 95% 90% 91%   Weight:       Height:           PHYSICAL EXAM   GEN Awake female, sitting upright in bed in no apparent distress. EYES Pupils are equally round. No scleral erythema, discharge, or conjunctivitis. HENT Mucous membranes are moist. Oral pharynx without exudates, no evidence of thrush. NECK Supple, no apparent thyromegaly or masses. RESP Clear to auscultation, no wheezes, rales or rhonchi. Symmetric chest movement  CARDIO/VASC S1/S2 auscultated. Regular rate without appreciable murmurs, rubs, or gallops. No JVD or carotid bruits. Peripheral pulses equal bilaterally and palpable. No peripheral edema. GI Abdomen is soft without significant tenderness, masses, or guarding. Bowel sounds are normoactive. Rectal exam deferred.  No costovertebral angle tenderness. Normal appearing external genitalia. HEME/LYMPH No palpable cervical lymphadenopathy and no hepatosplenomegaly. No petechiae or ecchymoses.   MSK Spontaneous movement of all extremities. No gross joint deformities. SKIN Normal coloration, warm, dry. NEURO Cranial nerves appear grossly intact, normal speech, no lateralizing weakness. PSYCH Awake, alert, oriented x 4. Affect appropriate. INTAKE: In: 1260 [P.O.:10; I.V.:1250]  Out: 200   OUTPUT: In: 1260   Out: 200     LABS  Recent Labs     08/21/20  1153 08/22/20  0355   WBC 7.7 4.3   HGB 12.0* 10.5*   HCT 38.5 34.5*    136*      Recent Labs     08/21/20  1153      K 4.1      CO2 28   BUN 15   CREATININE 0.8     Recent Labs     08/21/20  1153 08/22/20  0355   * 215*   * 166*   BILIDIR  --  1.4*   BILITOT 1.6* 1.9*   ALKPHOS 141* 121     No results for input(s): INR in the last 72 hours. Recent Labs     08/21/20  1153   TROPONINT <0.010          Abnormal labs for today noted      Imaging:     ECHO:    Microbiology:  Blood culture:    Urine culture:    Sputum culture:    Procedures done this admission:    MEDS  Scheduled Meds:   buPROPion  300 mg Oral QAM    acyclovir  200 mg Oral BID    cetirizine  10 mg Oral Daily    sodium chloride flush  10 mL Intravenous 2 times per day    enoxaparin  40 mg Subcutaneous Daily    piperacillin-tazobactam  3.375 g Intravenous Q8H     Continuous Infusions:   lactated ringers 150 mL/hr at 08/22/20 0554    sodium chloride Stopped (08/22/20 0553)     PRN Meds:cyclobenzaprine, docusate sodium, ondansetron, sodium chloride flush, acetaminophen, promethazine **OR** ondansetron, ketorolac, morphine        ASSESSMENT and [address] [facility] Day: 2    1-Acute pancreatitis likely related cholelithiasis- no choledocholithiasis on MRCP, s/p lap cholecystectomy on 8/22- IVF, pain management and diet to be advanced per surgery.  -serial labs- lipase and LFTs.   2-Anemia of chronic disease- monitor for now    Other issues  -arthritis  -IBD  -obesity with BMI of 32- needs aggressive lifestyle modification                   Disp:     Diet Diet NPO, After Midnight Exceptions are: Ice Chips   DVT Prophylaxis [] Lovenox, []  Heparin, [] SCDs, [] Ambulation   GI Prophylaxis [] PPI,  [] H2 Blocker,  [] Carafate,  [] Diet/Tube Feeds   Code Status Full Code   Disposition Patient requires continued admission due to acute pancreatitis   CMS Level of Risk [] Low, [] Moderate,[x]  High  Patient's risk as above due to acute pancreatitis     NOAH VENTURA MD 8/22/2020 1:45 PM

## 2020-08-23 LAB
ALBUMIN SERPL-MCNC: 3.4 GM/DL (ref 3.4–5)
ALP BLD-CCNC: 115 IU/L (ref 40–128)
ALT SERPL-CCNC: 134 U/L (ref 10–40)
AMYLASE: 96 U/L (ref 25–115)
ANION GAP SERPL CALCULATED.3IONS-SCNC: 6 MMOL/L (ref 4–16)
APTT: 42.9 SECONDS (ref 25.1–37.1)
AST SERPL-CCNC: 129 IU/L (ref 15–37)
BASOPHILS ABSOLUTE: 0 K/CU MM
BASOPHILS RELATIVE PERCENT: 0 % (ref 0–1)
BILIRUB SERPL-MCNC: 0.8 MG/DL (ref 0–1)
BUN BLDV-MCNC: 15 MG/DL (ref 6–23)
CALCIUM SERPL-MCNC: 8.5 MG/DL (ref 8.3–10.6)
CHLORIDE BLD-SCNC: 103 MMOL/L (ref 99–110)
CO2: 27 MMOL/L (ref 21–32)
CREAT SERPL-MCNC: 0.8 MG/DL (ref 0.6–1.1)
DIFFERENTIAL TYPE: ABNORMAL
EOSINOPHILS ABSOLUTE: 0 K/CU MM
EOSINOPHILS RELATIVE PERCENT: 0 % (ref 0–3)
GFR AFRICAN AMERICAN: >60 ML/MIN/1.73M2
GFR NON-AFRICAN AMERICAN: >60 ML/MIN/1.73M2
GLUCOSE BLD-MCNC: 99 MG/DL (ref 70–99)
HCT VFR BLD CALC: 35.1 % (ref 37–47)
HEMOGLOBIN: 10.7 GM/DL (ref 12.5–16)
IMMATURE NEUTROPHIL %: 0.4 % (ref 0–0.43)
INR BLD: 1.11 INDEX
LIPASE: 81 IU/L (ref 13–60)
LYMPHOCYTES ABSOLUTE: 0.6 K/CU MM
LYMPHOCYTES RELATIVE PERCENT: 7.6 % (ref 24–44)
MCH RBC QN AUTO: 28.3 PG (ref 27–31)
MCHC RBC AUTO-ENTMCNC: 30.5 % (ref 32–36)
MCV RBC AUTO: 92.9 FL (ref 78–100)
MONOCYTES ABSOLUTE: 0.6 K/CU MM
MONOCYTES RELATIVE PERCENT: 7.3 % (ref 0–4)
NUCLEATED RBC %: 0 %
PDW BLD-RTO: 13.4 % (ref 11.7–14.9)
PLATELET # BLD: 164 K/CU MM (ref 140–440)
PMV BLD AUTO: 10.2 FL (ref 7.5–11.1)
POTASSIUM SERPL-SCNC: 4.7 MMOL/L (ref 3.5–5.1)
PROTHROMBIN TIME: 13.5 SECONDS (ref 11.7–14.5)
RBC # BLD: 3.78 M/CU MM (ref 4.2–5.4)
SEGMENTED NEUTROPHILS ABSOLUTE COUNT: 6.5 K/CU MM
SEGMENTED NEUTROPHILS RELATIVE PERCENT: 84.7 % (ref 36–66)
SODIUM BLD-SCNC: 136 MMOL/L (ref 135–145)
TOTAL IMMATURE NEUTOROPHIL: 0.03 K/CU MM
TOTAL NUCLEATED RBC: 0 K/CU MM
TOTAL PROTEIN: 5.5 GM/DL (ref 6.4–8.2)
WBC # BLD: 7.7 K/CU MM (ref 4–10.5)

## 2020-08-23 PROCEDURE — 6360000002 HC RX W HCPCS: Performed by: HOSPITALIST

## 2020-08-23 PROCEDURE — 85025 COMPLETE CBC W/AUTO DIFF WBC: CPT

## 2020-08-23 PROCEDURE — 2580000003 HC RX 258: Performed by: SURGERY

## 2020-08-23 PROCEDURE — 1200000000 HC SEMI PRIVATE

## 2020-08-23 PROCEDURE — 2580000003 HC RX 258: Performed by: NURSE PRACTITIONER

## 2020-08-23 PROCEDURE — 80053 COMPREHEN METABOLIC PANEL: CPT

## 2020-08-23 PROCEDURE — 83690 ASSAY OF LIPASE: CPT

## 2020-08-23 PROCEDURE — 6370000000 HC RX 637 (ALT 250 FOR IP): Performed by: NURSE PRACTITIONER

## 2020-08-23 PROCEDURE — 85610 PROTHROMBIN TIME: CPT

## 2020-08-23 PROCEDURE — 6360000002 HC RX W HCPCS: Performed by: NURSE PRACTITIONER

## 2020-08-23 PROCEDURE — 82150 ASSAY OF AMYLASE: CPT

## 2020-08-23 PROCEDURE — 36415 COLL VENOUS BLD VENIPUNCTURE: CPT

## 2020-08-23 PROCEDURE — 85730 THROMBOPLASTIN TIME PARTIAL: CPT

## 2020-08-23 PROCEDURE — 94761 N-INVAS EAR/PLS OXIMETRY MLT: CPT

## 2020-08-23 PROCEDURE — 99024 POSTOP FOLLOW-UP VISIT: CPT | Performed by: SURGERY

## 2020-08-23 RX ORDER — CIPROFLOXACIN 500 MG/1
500 TABLET, FILM COATED ORAL 2 TIMES DAILY
Qty: 10 TABLET | Refills: 0 | Status: ON HOLD | OUTPATIENT
Start: 2020-08-23 | End: 2020-09-01 | Stop reason: HOSPADM

## 2020-08-23 RX ORDER — OXYCODONE HYDROCHLORIDE AND ACETAMINOPHEN 5; 325 MG/1; MG/1
1 TABLET ORAL EVERY 6 HOURS PRN
Qty: 14 TABLET | Refills: 0 | Status: ON HOLD | OUTPATIENT
Start: 2020-08-23 | End: 2020-09-01 | Stop reason: HOSPADM

## 2020-08-23 RX ORDER — METRONIDAZOLE 500 MG/1
500 TABLET ORAL 3 TIMES DAILY
Qty: 15 TABLET | Refills: 0 | Status: ON HOLD | OUTPATIENT
Start: 2020-08-23 | End: 2020-09-01 | Stop reason: HOSPADM

## 2020-08-23 RX ADMIN — MORPHINE SULFATE 2 MG: 2 INJECTION, SOLUTION INTRAMUSCULAR; INTRAVENOUS at 04:19

## 2020-08-23 RX ADMIN — CETIRIZINE HYDROCHLORIDE 10 MG: 10 TABLET, FILM COATED ORAL at 09:25

## 2020-08-23 RX ADMIN — PIPERACILLIN AND TAZOBACTAM 3.38 G: 3; .375 INJECTION, POWDER, FOR SOLUTION INTRAVENOUS at 05:45

## 2020-08-23 RX ADMIN — SODIUM CHLORIDE: 9 INJECTION, SOLUTION INTRAVENOUS at 04:23

## 2020-08-23 RX ADMIN — ACYCLOVIR 200 MG: 200 CAPSULE ORAL at 09:25

## 2020-08-23 RX ADMIN — BUPROPION HYDROCHLORIDE 300 MG: 150 TABLET, FILM COATED, EXTENDED RELEASE ORAL at 09:25

## 2020-08-23 RX ADMIN — MORPHINE SULFATE 2 MG: 2 INJECTION, SOLUTION INTRAMUSCULAR; INTRAVENOUS at 15:22

## 2020-08-23 RX ADMIN — PIPERACILLIN AND TAZOBACTAM 3.38 G: 3; .375 INJECTION, POWDER, FOR SOLUTION INTRAVENOUS at 21:32

## 2020-08-23 RX ADMIN — PIPERACILLIN AND TAZOBACTAM 3.38 G: 3; .375 INJECTION, POWDER, FOR SOLUTION INTRAVENOUS at 14:25

## 2020-08-23 RX ADMIN — KETOROLAC TROMETHAMINE 15 MG: 30 INJECTION, SOLUTION INTRAMUSCULAR; INTRAVENOUS at 21:40

## 2020-08-23 RX ADMIN — ACYCLOVIR 200 MG: 200 CAPSULE ORAL at 21:32

## 2020-08-23 RX ADMIN — ENOXAPARIN SODIUM 40 MG: 40 INJECTION SUBCUTANEOUS at 09:26

## 2020-08-23 ASSESSMENT — PAIN DESCRIPTION - LOCATION
LOCATION: ABDOMEN;BACK
LOCATION: ABDOMEN
LOCATION: ABDOMEN

## 2020-08-23 ASSESSMENT — PAIN DESCRIPTION - ORIENTATION
ORIENTATION: MID
ORIENTATION: MID
ORIENTATION: MID;LOWER

## 2020-08-23 ASSESSMENT — PAIN SCALES - GENERAL
PAINLEVEL_OUTOF10: 3
PAINLEVEL_OUTOF10: 7
PAINLEVEL_OUTOF10: 6
PAINLEVEL_OUTOF10: 8
PAINLEVEL_OUTOF10: 9
PAINLEVEL_OUTOF10: 5

## 2020-08-23 ASSESSMENT — PAIN DESCRIPTION - PAIN TYPE
TYPE: SURGICAL PAIN

## 2020-08-23 ASSESSMENT — PAIN DESCRIPTION - FREQUENCY: FREQUENCY: CONTINUOUS

## 2020-08-23 ASSESSMENT — PAIN DESCRIPTION - DESCRIPTORS: DESCRIPTORS: ACHING;CONSTANT;CRAMPING

## 2020-08-23 NOTE — PROGRESS NOTES
[address] [place] [address]  HOSPITALIST PROGRESS NOTE                       Name:  Angela Marsh /Age/Sex: 1955  (60 y.o. female)   MRN & CSN:  [id number] & [id number] Admission Date/Time: 2020 10:51 AM   Location:  [id number] Attending:  Dayne Rene MD                                                  HPI  Angela Marsh is a 60 y.o. female who presents with back pain    SUBJECTIVE  Reports some abdominal pain but looks better today    10 point review of systems reviewed and negative unless noted above. ALLERGIES:   Allergies   Allergen Reactions    Keflex [Cephalexin]        PCP: ALBERTO Miller CNP    PAST MEDICAL HISTORY, SURGICAL HISTORY, SOCIAL HISTORY and  HOME MEDICATIONS all reviewed. OBJECTIVE  Vitals:    20 2039 20 0145 20 0915   BP:  126/82 120/67 97/61   Pulse:  96 82 92   Resp:  13 12 25   Temp:  98.2 °F (36.8 °C) 98.3 °F (36.8 °C) 97.6 °F (36.4 °C)   TempSrc:  Oral Oral Oral   SpO2: 99% 96% 96% 93%   Weight:   240 lb 11.2 oz (109.2 kg)    Height:           PHYSICAL EXAM   GEN Awake female, sitting upright in bed in no apparent distress. EYES Pupils are equally round. No scleral erythema, discharge, or conjunctivitis. HENT Mucous membranes are moist. Oral pharynx without exudates, no evidence of thrush. NECK Supple, no apparent thyromegaly or masses. RESP Clear to auscultation, no wheezes, rales or rhonchi. Symmetric chest movement  CARDIO/VASC S1/S2 auscultated. Regular rate without appreciable murmurs, rubs, or gallops. No JVD or carotid bruits. Peripheral pulses equal bilaterally and palpable. No peripheral edema. GI Abdomen is soft without significant tenderness, masses, or guarding. Bowel sounds are normoactive. Rectal exam deferred.  No costovertebral angle tenderness. Normal appearing external genitalia. HEME/LYMPH No palpable cervical lymphadenopathy and no hepatosplenomegaly. No petechiae or ecchymoses.   MSK Spontaneous movement of all extremities. No gross joint deformities. SKIN Normal coloration, warm, dry. NEURO Cranial nerves appear grossly intact, normal speech, no lateralizing weakness. PSYCH Awake, alert, oriented x 4. Affect appropriate. INTAKE: No intake/output data recorded. OUTPUT: No intake/output data recorded.     LABS  Recent Labs     08/21/20  1153 08/22/20  0355 08/23/20  0353   WBC 7.7 4.3 7.7   HGB 12.0* 10.5* 10.7*   HCT 38.5 34.5* 35.1*    136* 164      Recent Labs     08/21/20  1153 08/23/20  0353    136   K 4.1 4.7    103   CO2 28 27   BUN 15 15   CREATININE 0.8 0.8     Recent Labs     08/21/20  1153 08/22/20  0355 08/23/20  0353   * 215* 129*   * 166* 134*   BILIDIR  --  1.4*  --    BILITOT 1.6* 1.9* 0.8   ALKPHOS 141* 121 115     Recent Labs     08/23/20  0353   INR 1.11     Recent Labs     08/21/20  1153   TROPONINT <0.010          Abnormal labs for today noted      Imaging:     ECHO:    Microbiology:  Blood culture:    Urine culture:    Sputum culture:    Procedures done this admission:    MEDS  Scheduled Meds:   buPROPion  300 mg Oral QAM    acyclovir  200 mg Oral BID    cetirizine  10 mg Oral Daily    sodium chloride flush  10 mL Intravenous 2 times per day    enoxaparin  40 mg Subcutaneous Daily    piperacillin-tazobactam  3.375 g Intravenous Q8H     Continuous Infusions:   sodium chloride 100 mL/hr at 08/23/20 0423     PRN Meds:cyclobenzaprine, docusate sodium, morphine, ondansetron, sodium chloride flush, acetaminophen, promethazine **OR** ondansetron, ketorolac        ASSESSMENT and PLAN  Hospital Day: 3    1-Acute pancreatitis likely related cholelithiasis- no choledocholithiasis on MRCP, s/p lap cholecystectomy on 8/22- IVF, pain management and diet to be advanced per surgery.  -serial labs- lipase and LFTs-on the improving trend  2-Anemia of chronic disease- monitor for now    Other issues  -arthritis  -IBD  -obesity with BMI of 32- needs aggressive lifestyle modification      To be discharged once tolerating oral diet and cleared by surgery             Disp:     Diet DIET GENERAL;   DVT Prophylaxis [] Lovenox, []  Heparin, [] SCDs, [] Ambulation   GI Prophylaxis [] PPI,  [] H2 Blocker,  [] Carafate,  [] Diet/Tube Feeds   Code Status Full Code   Disposition Patient requires continued admission due to acute pancreatitis   CMS Level of Risk [] Low, [] Moderate,[x]  High  Patient's risk as above due to acute pancreatitis     NOAH VENTURA MD 8/23/2020 9:57 AM

## 2020-08-23 NOTE — PROGRESS NOTES
GENERAL SURGERY PROGRESS NOTE    Tri Wilkerson is a 60 y.o. female POD1 from laparoscopic cholecystectomy for gallstone pancreatitis. Subjective:  Doing ok today. Pain is appropriate and well controlled. Tolerating some diet and ambulating. Objective:    Vitals: VITALS:  BP 97/61   Pulse 92   Temp 97.6 °F (36.4 °C) (Oral)   Resp 25   Ht 5' 8\" (1.727 m)   Wt 240 lb 11.2 oz (109.2 kg)   SpO2 93%   BMI 36.60 kg/m²     I/O: 08/22 0701 - 08/23 0700  In: 1260 [P.O.:10; I.V.:1250]  Out: 200     Labs/Imaging Results:   Lab Results   Component Value Date     08/23/2020    K 4.7 08/23/2020     08/23/2020    CO2 27 08/23/2020    BUN 15 08/23/2020    CREATININE 0.8 08/23/2020    GLUCOSE 99 08/23/2020    CALCIUM 8.5 08/23/2020      Lab Results   Component Value Date    WBC 7.7 08/23/2020    HGB 10.7 (L) 08/23/2020    HCT 35.1 (L) 08/23/2020    MCV 92.9 08/23/2020     08/23/2020         IV Fluids: sodium chloride Last Rate: 100 mL/hr at 08/23/20 0423    Scheduled Meds:   buPROPion, 300 mg, Oral, QAM    acyclovir, 200 mg, Oral, BID    cetirizine, 10 mg, Oral, Daily    sodium chloride flush, 10 mL, Intravenous, 2 times per day    enoxaparin, 40 mg, Subcutaneous, Daily    piperacillin-tazobactam, 3.375 g, Intravenous, Q8H    Physical Exam:  General: A&O x 3, no distress. HEENT: Anicteric sclerae, MMM. Extremities: No edema bilat LE. Abdomen: Soft, appropriately tender, incisions clean and dry with dermabond. Assessment and Plan:  60 y.o. female s/p laparoscopic cholecystectomy. We discussed her fatty liver disease today and instruction was given. She was oozy during her surgery, I am not sure if this is due to her liver or other factors. Hgb stable this morning.     Patient Active Problem List:     Acute pancreatitis due to calculus of common bile duct      Ok for discharge from a surgical perspective  Follow up ordered with me in 1-2 weeks  surgical photos provided.     Bhavin Watkins MD

## 2020-08-23 NOTE — PLAN OF CARE
Problem: Pain:  Goal: Pain level will decrease  Description: Pain level will decrease  Outcome: Ongoing  Goal: Control of acute pain  Description: Control of acute pain  Outcome: Ongoing  Goal: Control of chronic pain  Description: Control of chronic pain  Outcome: Ongoing     Problem: Activity:  Goal: Risk for activity intolerance will decrease  Description: Risk for activity intolerance will decrease  Outcome: Ongoing     Problem:  Bowel/Gastric:  Goal: Bowel function will improve  Description: Bowel function will improve  Outcome: Ongoing  Goal: Diagnostic test results will improve  Description: Diagnostic test results will improve  Outcome: Ongoing  Goal: Occurrences of nausea will decrease  Description: Occurrences of nausea will decrease  Outcome: Ongoing  Goal: Occurrences of vomiting will decrease  Description: Occurrences of vomiting will decrease  Outcome: Ongoing     Problem: Fluid Volume:  Goal: Maintenance of adequate hydration will improve  Description: Maintenance of adequate hydration will improve  Outcome: Ongoing     Problem: Health Behavior:  Goal: Ability to state signs and symptoms to report to health care provider will improve  Description: Ability to state signs and symptoms to report to health care provider will improve  Outcome: Ongoing     Problem: Physical Regulation:  Goal: Complications related to the disease process, condition or treatment will be avoided or minimized  Description: Complications related to the disease process, condition or treatment will be avoided or minimized  Outcome: Ongoing  Goal: Ability to maintain clinical measurements within normal limits will improve  Description: Ability to maintain clinical measurements within normal limits will improve  Outcome: Ongoing     Problem: Sensory:  Goal: Pain level will decrease  Description: Pain level will decrease  Outcome: Ongoing  Goal: Ability to identify factors that increase the pain will improve  Description: Ability to

## 2020-08-24 VITALS
BODY MASS INDEX: 36.53 KG/M2 | HEIGHT: 68 IN | WEIGHT: 241 LBS | RESPIRATION RATE: 27 BRPM | TEMPERATURE: 98.3 F | DIASTOLIC BLOOD PRESSURE: 91 MMHG | OXYGEN SATURATION: 96 % | HEART RATE: 98 BPM | SYSTOLIC BLOOD PRESSURE: 130 MMHG

## 2020-08-24 PROCEDURE — 88304 TISSUE EXAM BY PATHOLOGIST: CPT | Performed by: PATHOLOGY

## 2020-08-24 PROCEDURE — 6360000002 HC RX W HCPCS: Performed by: HOSPITALIST

## 2020-08-24 PROCEDURE — 2580000003 HC RX 258: Performed by: SURGERY

## 2020-08-24 PROCEDURE — 2580000003 HC RX 258: Performed by: NURSE PRACTITIONER

## 2020-08-24 PROCEDURE — 6370000000 HC RX 637 (ALT 250 FOR IP): Performed by: NURSE PRACTITIONER

## 2020-08-24 PROCEDURE — 6360000002 HC RX W HCPCS: Performed by: NURSE PRACTITIONER

## 2020-08-24 RX ORDER — ONDANSETRON 4 MG/1
4 TABLET, FILM COATED ORAL 3 TIMES DAILY PRN
Qty: 15 TABLET | Refills: 0 | Status: SHIPPED | OUTPATIENT
Start: 2020-08-24

## 2020-08-24 RX ADMIN — CETIRIZINE HYDROCHLORIDE 10 MG: 10 TABLET, FILM COATED ORAL at 08:44

## 2020-08-24 RX ADMIN — PIPERACILLIN AND TAZOBACTAM 3.38 G: 3; .375 INJECTION, POWDER, FOR SOLUTION INTRAVENOUS at 06:32

## 2020-08-24 RX ADMIN — MORPHINE SULFATE 2 MG: 2 INJECTION, SOLUTION INTRAMUSCULAR; INTRAVENOUS at 08:43

## 2020-08-24 RX ADMIN — SODIUM CHLORIDE, PRESERVATIVE FREE 10 ML: 5 INJECTION INTRAVENOUS at 08:44

## 2020-08-24 RX ADMIN — SODIUM CHLORIDE: 9 INJECTION, SOLUTION INTRAVENOUS at 02:35

## 2020-08-24 RX ADMIN — ENOXAPARIN SODIUM 40 MG: 40 INJECTION SUBCUTANEOUS at 08:43

## 2020-08-24 RX ADMIN — BUPROPION HYDROCHLORIDE 300 MG: 150 TABLET, FILM COATED, EXTENDED RELEASE ORAL at 08:43

## 2020-08-24 RX ADMIN — ACYCLOVIR 200 MG: 200 CAPSULE ORAL at 08:43

## 2020-08-24 ASSESSMENT — PAIN SCALES - GENERAL
PAINLEVEL_OUTOF10: 7

## 2020-08-24 NOTE — DISCHARGE SUMMARY
Discharge Summary    Name:  Daniel Pinzon /Age/Sex: 1955  (60 y.o. female)   MRN & CSN:  [id number] & [id number] Admission Date/Time: 2020 10:51 AM   Attending:  Edmund Starks MD Discharging Physician: Thedora Galeazzi, MD     HPI and Hospital Course:   Daniel Pinzon is a 60 y.o.  female  who presents with Back Pain (\"up and down my back\", woke pt up out of sleep, no focal deficts)    HPI- as per H and [address] 67  1-Acute pancreatitis likely related cholelithiasis- no choledocholithiasis on MRCP, s/p lap cholecystectomy on - IVF, pain management and diet to be advanced per surgery. -has much improvement-tolerated oral regular diet. Lipase and LFTs also trended down significantly. Will discharge home  2-Anemia of chronic disease- monitor for now     Other issues  -arthritis  -IBD  -obesity with BMI of 32- needs aggressive lifestyle modification       The patient expressed appropriate understanding of and agreement with the discharge recommendations, medications, and plan.      Consults this admission:  IP CONSULT TO GENERAL SURGERY  IP CONSULT TO HOSPITALIST  IP CONSULT TO GI    Discharge Instruction:   Follow up appointments:   Primary care physician:  within 2 weeks    Diet:  General/cardiac/ADA/as tolerated  Activity: {discharge activity: as tolerated  Disposition: Discharged to:   [x]Home, []C, []SNF, []Acute Rehab, []Hospice   Condition on discharge: Stable    Discharge Medications:      [facility], [facility] Medication Instructions SHARIFA:[id number]    Printed on:20 1001   Medication Information                      acyclovir (ZOVIRAX) 800 MG tablet  Take 200 mg by mouth 2 times daily             buPROPion (WELLBUTRIN XL) 300 MG extended release tablet  Take 300 mg by mouth every morning             ciprofloxacin (CIPRO) 500 MG tablet  Take 1 tablet by mouth 2 times daily for 5 days             cyclobenzaprine (FLEXERIL) 10 MG tablet  Take 10 mg by mouth 3 times daily as needed for Muscle spasms. docusate sodium (COLACE) 100 MG capsule  Take 100 mg by mouth 2 times daily as needed for Constipation. loratadine (CLARITIN) 10 MG capsule  Take 10 mg by mouth daily             metroNIDAZOLE (FLAGYL) 500 MG tablet  Take 1 tablet by mouth 3 times daily for 5 days             ondansetron (ZOFRAN) 4 MG tablet  Take 1 tablet by mouth 3 times daily as needed for Nausea or Vomiting             oxyCODONE-acetaminophen (PERCOCET) 5-325 MG per tablet  Take 1 tablet by mouth every 6 hours as needed for Pain for up to 5 days. Intended supply: 5 days. Take lowest dose possible to manage pain                 Objective Findings at Discharge:   BP (!) 130/91   Pulse 98   Temp 98.3 °F (36.8 °C) (Oral)   Resp 27   Ht 5' 8\" (1.727 m)   Wt 241 lb (109.3 kg)   SpO2 96%   BMI 36.64 kg/m²            PHYSICAL EXAM  GEN Awake female, laying in bed in no apparent distress. EYES Pupils are equally round. No scleral discharge  HENT Atraumatic and symmetric head  NECK No apparent thyromegaly  RESP Symmetric chest movement while on room air. CARDIO/VASC Peripheral pulses equal bilaterally and palpable. GI Abdomen is not distended. Rectal exam deferred.  Childs catheter is not present. HEME/LYMPH No petechiae or ecchymoses. MSK Spontaneous movement of BL upper extremities  SKIN Normal coloration, warm, dry. NEURO Cranial nerves appear grossly intact  PSYCH Awake, alert.     BMP/CBC  Recent Labs     08/21/20  1153 08/22/20  0355 08/23/20  0353     --  136   K 4.1  --  4.7     --  103   CO2 28  --  27   BUN 15  --  15   CREATININE 0.8  --  0.8   WBC 7.7 4.3 7.7   HCT 38.5 34.5* 35.1*    136* 164     SIGNIFICANT IMAGING AND LABS:      Discharge Time of 31 minutes    Electronically signed by Kevin Davenport MD on 8/24/2020 at 10:01 AM

## 2020-08-26 ENCOUNTER — TELEPHONE (OUTPATIENT)
Dept: SURGERY | Age: 65
End: 2020-08-26

## 2020-08-26 ENCOUNTER — HOSPITAL ENCOUNTER (INPATIENT)
Age: 65
LOS: 5 days | Discharge: HOME | DRG: 286 | End: 2020-09-01
Attending: INTERNAL MEDICINE | Admitting: INTERNAL MEDICINE
Payer: MEDICARE

## 2020-08-26 PROCEDURE — 99285 EMERGENCY DEPT VISIT HI MDM: CPT

## 2020-08-26 ASSESSMENT — PAIN DESCRIPTION - PAIN TYPE: TYPE: ACUTE PAIN

## 2020-08-26 ASSESSMENT — PAIN SCALES - GENERAL: PAINLEVEL_OUTOF10: 10

## 2020-08-26 ASSESSMENT — PAIN DESCRIPTION - LOCATION: LOCATION: ABDOMEN

## 2020-08-26 NOTE — TELEPHONE ENCOUNTER
Pt called c/o being sick to her stomach all day. Nausea medication has not been working.  Please advise

## 2020-08-27 ENCOUNTER — APPOINTMENT (OUTPATIENT)
Dept: CT IMAGING | Age: 65
DRG: 286 | End: 2020-08-27
Payer: MEDICARE

## 2020-08-27 ENCOUNTER — APPOINTMENT (OUTPATIENT)
Dept: ULTRASOUND IMAGING | Age: 65
DRG: 286 | End: 2020-08-27
Payer: MEDICARE

## 2020-08-27 PROBLEM — J96.01 ACUTE RESPIRATORY FAILURE WITH HYPOXIA (HCC): Status: ACTIVE | Noted: 2020-08-27

## 2020-08-27 LAB
ABO/RH: NORMAL
ALBUMIN SERPL-MCNC: 3.8 GM/DL (ref 3.4–5)
ALP BLD-CCNC: 101 IU/L (ref 40–128)
ALT SERPL-CCNC: 54 U/L (ref 10–40)
ANION GAP SERPL CALCULATED.3IONS-SCNC: 13 MMOL/L (ref 4–16)
ANTIBODY SCREEN: NEGATIVE
APTT: 43.3 SECONDS (ref 25.1–37.1)
AST SERPL-CCNC: 29 IU/L (ref 15–37)
BACTERIA: NEGATIVE /HPF
BASOPHILS ABSOLUTE: 0 K/CU MM
BASOPHILS RELATIVE PERCENT: 0.6 % (ref 0–1)
BILIRUB SERPL-MCNC: 0.6 MG/DL (ref 0–1)
BILIRUBIN URINE: NEGATIVE MG/DL
BLOOD, URINE: NEGATIVE
BUN BLDV-MCNC: 9 MG/DL (ref 6–23)
CALCIUM SERPL-MCNC: 9.3 MG/DL (ref 8.3–10.6)
CAST TYPE: ABNORMAL /HPF
CHLORIDE BLD-SCNC: 102 MMOL/L (ref 99–110)
CLARITY: CLEAR
CO2: 24 MMOL/L (ref 21–32)
COLOR: YELLOW
COMMENT UA: ABNORMAL
CREAT SERPL-MCNC: 0.7 MG/DL (ref 0.6–1.1)
CRYSTAL TYPE: NEGATIVE /HPF
D DIMER: 1913 NG/ML(DDU)
DIFFERENTIAL TYPE: ABNORMAL
EOSINOPHILS ABSOLUTE: 0.2 K/CU MM
EOSINOPHILS RELATIVE PERCENT: 2.2 % (ref 0–3)
EPITHELIAL CELLS, UA: 1 /HPF
FERRITIN: 65 NG/ML (ref 15–150)
FIBRINOGEN LEVEL: 585 MG/DL (ref 196.9–442.1)
GFR AFRICAN AMERICAN: >60 ML/MIN/1.73M2
GFR NON-AFRICAN AMERICAN: >60 ML/MIN/1.73M2
GLUCOSE BLD-MCNC: 114 MG/DL (ref 70–99)
GLUCOSE, URINE: NEGATIVE MG/DL
GONADOTROPIN, CHORIONIC (HCG) QUANT: 2.3 UIU/ML
HCT VFR BLD CALC: 36 % (ref 37–47)
HEMOGLOBIN: 11.4 GM/DL (ref 12.5–16)
HIGH SENSITIVE C-REACTIVE PROTEIN: 19 MG/L
IMMATURE NEUTROPHIL %: 0.3 % (ref 0–0.43)
INR BLD: 1.19 INDEX
KETONES, URINE: NEGATIVE MG/DL
LACTATE DEHYDROGENASE: 316 IU/L (ref 120–246)
LEUKOCYTE ESTERASE, URINE: NEGATIVE
LIPASE: 44 IU/L (ref 13–60)
LV EF: 18 %
LVEF MODALITY: NORMAL
LYMPHOCYTES ABSOLUTE: 1.7 K/CU MM
LYMPHOCYTES RELATIVE PERCENT: 24.3 % (ref 24–44)
MAGNESIUM: 1.9 MG/DL (ref 1.8–2.4)
MCH RBC QN AUTO: 28.2 PG (ref 27–31)
MCHC RBC AUTO-ENTMCNC: 31.7 % (ref 32–36)
MCV RBC AUTO: 89.1 FL (ref 78–100)
MONOCYTES ABSOLUTE: 0.6 K/CU MM
MONOCYTES RELATIVE PERCENT: 8.7 % (ref 0–4)
NITRITE URINE, QUANTITATIVE: NEGATIVE
NUCLEATED RBC %: 0 %
PDW BLD-RTO: 13.3 % (ref 11.7–14.9)
PH, URINE: 8 (ref 5–8)
PLATELET # BLD: 237 K/CU MM (ref 140–440)
PMV BLD AUTO: 9.6 FL (ref 7.5–11.1)
POTASSIUM SERPL-SCNC: 3.5 MMOL/L (ref 3.5–5.1)
POTASSIUM SERPL-SCNC: 3.6 MMOL/L (ref 3.5–5.1)
PRO-BNP: 5724 PG/ML
PROCALCITONIN: 0.1
PROTEIN UA: NEGATIVE MG/DL
PROTHROMBIN TIME: 14.4 SECONDS (ref 11.7–14.5)
RBC # BLD: 4.04 M/CU MM (ref 4.2–5.4)
RBC URINE: ABNORMAL /HPF (ref 0–6)
SEGMENTED NEUTROPHILS ABSOLUTE COUNT: 4.6 K/CU MM
SEGMENTED NEUTROPHILS RELATIVE PERCENT: 63.9 % (ref 36–66)
SODIUM BLD-SCNC: 139 MMOL/L (ref 135–145)
SPECIFIC GRAVITY UA: 1 (ref 1–1.03)
TOTAL IMMATURE NEUTOROPHIL: 0.02 K/CU MM
TOTAL NUCLEATED RBC: 0 K/CU MM
TOTAL PROTEIN: 6.8 GM/DL (ref 6.4–8.2)
TROPONIN T: <0.01 NG/ML
UROBILINOGEN, URINE: NORMAL MG/DL (ref 0.2–1)
WBC # BLD: 7.1 K/CU MM (ref 4–10.5)
WBC UA: <1 /HPF (ref 0–5)

## 2020-08-27 PROCEDURE — 99222 1ST HOSP IP/OBS MODERATE 55: CPT | Performed by: INTERNAL MEDICINE

## 2020-08-27 PROCEDURE — 85379 FIBRIN DEGRADATION QUANT: CPT

## 2020-08-27 PROCEDURE — 84484 ASSAY OF TROPONIN QUANT: CPT

## 2020-08-27 PROCEDURE — 93010 ELECTROCARDIOGRAM REPORT: CPT | Performed by: INTERNAL MEDICINE

## 2020-08-27 PROCEDURE — 2580000003 HC RX 258: Performed by: INTERNAL MEDICINE

## 2020-08-27 PROCEDURE — 86850 RBC ANTIBODY SCREEN: CPT

## 2020-08-27 PROCEDURE — 99024 POSTOP FOLLOW-UP VISIT: CPT | Performed by: SURGERY

## 2020-08-27 PROCEDURE — 2060000000 HC ICU INTERMEDIATE R&B

## 2020-08-27 PROCEDURE — 74177 CT ABD & PELVIS W/CONTRAST: CPT

## 2020-08-27 PROCEDURE — 6360000002 HC RX W HCPCS: Performed by: INTERNAL MEDICINE

## 2020-08-27 PROCEDURE — U0002 COVID-19 LAB TEST NON-CDC: HCPCS

## 2020-08-27 PROCEDURE — 85025 COMPLETE CBC W/AUTO DIFF WBC: CPT

## 2020-08-27 PROCEDURE — 6370000000 HC RX 637 (ALT 250 FOR IP): Performed by: INTERNAL MEDICINE

## 2020-08-27 PROCEDURE — 83690 ASSAY OF LIPASE: CPT

## 2020-08-27 PROCEDURE — 96375 TX/PRO/DX INJ NEW DRUG ADDON: CPT

## 2020-08-27 PROCEDURE — 6360000002 HC RX W HCPCS: Performed by: PHYSICIAN ASSISTANT

## 2020-08-27 PROCEDURE — 93970 EXTREMITY STUDY: CPT

## 2020-08-27 PROCEDURE — 86900 BLOOD TYPING SEROLOGIC ABO: CPT

## 2020-08-27 PROCEDURE — 93306 TTE W/DOPPLER COMPLETE: CPT

## 2020-08-27 PROCEDURE — 86901 BLOOD TYPING SEROLOGIC RH(D): CPT

## 2020-08-27 PROCEDURE — 80053 COMPREHEN METABOLIC PANEL: CPT

## 2020-08-27 PROCEDURE — 1200000000 HC SEMI PRIVATE

## 2020-08-27 PROCEDURE — 6360000004 HC RX CONTRAST MEDICATION: Performed by: PHYSICIAN ASSISTANT

## 2020-08-27 PROCEDURE — 85730 THROMBOPLASTIN TIME PARTIAL: CPT

## 2020-08-27 PROCEDURE — 84132 ASSAY OF SERUM POTASSIUM: CPT

## 2020-08-27 PROCEDURE — 83735 ASSAY OF MAGNESIUM: CPT

## 2020-08-27 PROCEDURE — 96374 THER/PROPH/DIAG INJ IV PUSH: CPT

## 2020-08-27 PROCEDURE — 85384 FIBRINOGEN ACTIVITY: CPT

## 2020-08-27 PROCEDURE — 86141 C-REACTIVE PROTEIN HS: CPT

## 2020-08-27 PROCEDURE — 71275 CT ANGIOGRAPHY CHEST: CPT

## 2020-08-27 PROCEDURE — 36415 COLL VENOUS BLD VENIPUNCTURE: CPT

## 2020-08-27 PROCEDURE — 96376 TX/PRO/DX INJ SAME DRUG ADON: CPT

## 2020-08-27 PROCEDURE — 2580000003 HC RX 258: Performed by: PHYSICIAN ASSISTANT

## 2020-08-27 PROCEDURE — 93005 ELECTROCARDIOGRAM TRACING: CPT | Performed by: PHYSICIAN ASSISTANT

## 2020-08-27 PROCEDURE — 81001 URINALYSIS AUTO W/SCOPE: CPT

## 2020-08-27 PROCEDURE — 85610 PROTHROMBIN TIME: CPT

## 2020-08-27 PROCEDURE — 83880 ASSAY OF NATRIURETIC PEPTIDE: CPT

## 2020-08-27 PROCEDURE — 84702 CHORIONIC GONADOTROPIN TEST: CPT

## 2020-08-27 PROCEDURE — 84145 PROCALCITONIN (PCT): CPT

## 2020-08-27 PROCEDURE — 94761 N-INVAS EAR/PLS OXIMETRY MLT: CPT

## 2020-08-27 PROCEDURE — 83615 LACTATE (LD) (LDH) ENZYME: CPT

## 2020-08-27 PROCEDURE — 82728 ASSAY OF FERRITIN: CPT

## 2020-08-27 RX ORDER — POLYETHYLENE GLYCOL 3350 17 G/17G
17 POWDER, FOR SOLUTION ORAL DAILY PRN
Status: DISCONTINUED | OUTPATIENT
Start: 2020-08-27 | End: 2020-09-01 | Stop reason: HOSPADM

## 2020-08-27 RX ORDER — SODIUM CHLORIDE 0.9 % (FLUSH) 0.9 %
10 SYRINGE (ML) INJECTION 2 TIMES DAILY
Status: DISCONTINUED | OUTPATIENT
Start: 2020-08-27 | End: 2020-09-01

## 2020-08-27 RX ORDER — MORPHINE SULFATE 4 MG/ML
4 INJECTION, SOLUTION INTRAMUSCULAR; INTRAVENOUS EVERY 30 MIN PRN
Status: DISCONTINUED | OUTPATIENT
Start: 2020-08-27 | End: 2020-09-01 | Stop reason: HOSPADM

## 2020-08-27 RX ORDER — METOPROLOL SUCCINATE 25 MG/1
12.5 TABLET, EXTENDED RELEASE ORAL DAILY
Status: DISCONTINUED | OUTPATIENT
Start: 2020-08-27 | End: 2020-08-29

## 2020-08-27 RX ORDER — SODIUM CHLORIDE 0.9 % (FLUSH) 0.9 %
10 SYRINGE (ML) INJECTION EVERY 12 HOURS SCHEDULED
Status: DISCONTINUED | OUTPATIENT
Start: 2020-08-27 | End: 2020-09-01 | Stop reason: HOSPADM

## 2020-08-27 RX ORDER — FUROSEMIDE 10 MG/ML
20 INJECTION INTRAMUSCULAR; INTRAVENOUS 2 TIMES DAILY
Status: DISCONTINUED | OUTPATIENT
Start: 2020-08-27 | End: 2020-08-27

## 2020-08-27 RX ORDER — ONDANSETRON 2 MG/ML
4 INJECTION INTRAMUSCULAR; INTRAVENOUS EVERY 6 HOURS PRN
Status: DISCONTINUED | OUTPATIENT
Start: 2020-08-27 | End: 2020-09-01 | Stop reason: HOSPADM

## 2020-08-27 RX ORDER — SODIUM CHLORIDE 0.9 % (FLUSH) 0.9 %
10 SYRINGE (ML) INJECTION PRN
Status: DISCONTINUED | OUTPATIENT
Start: 2020-08-27 | End: 2020-09-01 | Stop reason: HOSPADM

## 2020-08-27 RX ORDER — ONDANSETRON 2 MG/ML
4 INJECTION INTRAMUSCULAR; INTRAVENOUS EVERY 30 MIN PRN
Status: COMPLETED | OUTPATIENT
Start: 2020-08-27 | End: 2020-08-27

## 2020-08-27 RX ORDER — METOCLOPRAMIDE HYDROCHLORIDE 5 MG/ML
10 INJECTION INTRAMUSCULAR; INTRAVENOUS EVERY 6 HOURS
Status: DISCONTINUED | OUTPATIENT
Start: 2020-08-27 | End: 2020-08-29

## 2020-08-27 RX ORDER — BUPROPION HYDROCHLORIDE 150 MG/1
300 TABLET ORAL EVERY MORNING
Status: DISCONTINUED | OUTPATIENT
Start: 2020-08-27 | End: 2020-09-01 | Stop reason: HOSPADM

## 2020-08-27 RX ORDER — ACETAMINOPHEN 325 MG/1
650 TABLET ORAL EVERY 6 HOURS PRN
Status: DISCONTINUED | OUTPATIENT
Start: 2020-08-27 | End: 2020-09-01 | Stop reason: HOSPADM

## 2020-08-27 RX ORDER — SODIUM CHLORIDE 9 MG/ML
INJECTION, SOLUTION INTRAVENOUS CONTINUOUS
Status: DISCONTINUED | OUTPATIENT
Start: 2020-08-27 | End: 2020-08-27

## 2020-08-27 RX ORDER — PANTOPRAZOLE SODIUM 40 MG/1
40 TABLET, DELAYED RELEASE ORAL
Status: DISCONTINUED | OUTPATIENT
Start: 2020-08-27 | End: 2020-09-01 | Stop reason: HOSPADM

## 2020-08-27 RX ORDER — ACETAMINOPHEN 650 MG/1
650 SUPPOSITORY RECTAL EVERY 6 HOURS PRN
Status: DISCONTINUED | OUTPATIENT
Start: 2020-08-27 | End: 2020-09-01 | Stop reason: HOSPADM

## 2020-08-27 RX ORDER — DOCUSATE SODIUM 100 MG/1
100 CAPSULE, LIQUID FILLED ORAL 2 TIMES DAILY
Status: DISCONTINUED | OUTPATIENT
Start: 2020-08-27 | End: 2020-09-01 | Stop reason: HOSPADM

## 2020-08-27 RX ORDER — ACYCLOVIR 200 MG/1
200 CAPSULE ORAL 2 TIMES DAILY
Status: DISCONTINUED | OUTPATIENT
Start: 2020-08-27 | End: 2020-09-01 | Stop reason: HOSPADM

## 2020-08-27 RX ORDER — PROMETHAZINE HYDROCHLORIDE 25 MG/1
12.5 TABLET ORAL EVERY 6 HOURS PRN
Status: DISCONTINUED | OUTPATIENT
Start: 2020-08-27 | End: 2020-09-01 | Stop reason: HOSPADM

## 2020-08-27 RX ORDER — FUROSEMIDE 10 MG/ML
20 INJECTION INTRAMUSCULAR; INTRAVENOUS ONCE
Status: COMPLETED | OUTPATIENT
Start: 2020-08-27 | End: 2020-08-27

## 2020-08-27 RX ORDER — POLYETHYLENE GLYCOL 3350 17 G/17G
17 POWDER, FOR SOLUTION ORAL 2 TIMES DAILY
Status: DISCONTINUED | OUTPATIENT
Start: 2020-08-27 | End: 2020-09-01 | Stop reason: HOSPADM

## 2020-08-27 RX ORDER — CALCIUM CARBONATE 200(500)MG
500 TABLET,CHEWABLE ORAL 3 TIMES DAILY PRN
Status: DISCONTINUED | OUTPATIENT
Start: 2020-08-27 | End: 2020-09-01 | Stop reason: HOSPADM

## 2020-08-27 RX ORDER — FUROSEMIDE 10 MG/ML
40 INJECTION INTRAMUSCULAR; INTRAVENOUS 2 TIMES DAILY
Status: DISCONTINUED | OUTPATIENT
Start: 2020-08-27 | End: 2020-08-27

## 2020-08-27 RX ADMIN — METOPROLOL SUCCINATE 12.5 MG: 25 TABLET, EXTENDED RELEASE ORAL at 20:13

## 2020-08-27 RX ADMIN — PANTOPRAZOLE SODIUM 40 MG: 40 TABLET, DELAYED RELEASE ORAL at 15:57

## 2020-08-27 RX ADMIN — ONDANSETRON 4 MG: 2 INJECTION INTRAMUSCULAR; INTRAVENOUS at 01:39

## 2020-08-27 RX ADMIN — METOCLOPRAMIDE 10 MG: 5 INJECTION, SOLUTION INTRAMUSCULAR; INTRAVENOUS at 20:03

## 2020-08-27 RX ADMIN — ACYCLOVIR 200 MG: 200 CAPSULE ORAL at 20:03

## 2020-08-27 RX ADMIN — METOCLOPRAMIDE 10 MG: 5 INJECTION, SOLUTION INTRAMUSCULAR; INTRAVENOUS at 14:31

## 2020-08-27 RX ADMIN — SODIUM CHLORIDE, PRESERVATIVE FREE 10 ML: 5 INJECTION INTRAVENOUS at 20:04

## 2020-08-27 RX ADMIN — ACYCLOVIR 200 MG: 200 CAPSULE ORAL at 11:19

## 2020-08-27 RX ADMIN — BUPROPION HYDROCHLORIDE 300 MG: 150 TABLET, EXTENDED RELEASE ORAL at 11:18

## 2020-08-27 RX ADMIN — ONDANSETRON 4 MG: 2 INJECTION INTRAMUSCULAR; INTRAVENOUS at 05:15

## 2020-08-27 RX ADMIN — FUROSEMIDE 5 MG/HR: 10 INJECTION, SOLUTION INTRAMUSCULAR; INTRAVENOUS at 18:52

## 2020-08-27 RX ADMIN — POLYETHYLENE GLYCOL (3350) 17 G: 17 POWDER, FOR SOLUTION ORAL at 11:18

## 2020-08-27 RX ADMIN — SODIUM CHLORIDE: 9 INJECTION, SOLUTION INTRAVENOUS at 01:39

## 2020-08-27 RX ADMIN — AZITHROMYCIN MONOHYDRATE 500 MG: 500 INJECTION, POWDER, LYOPHILIZED, FOR SOLUTION INTRAVENOUS at 05:09

## 2020-08-27 RX ADMIN — FUROSEMIDE 20 MG: 10 INJECTION, SOLUTION INTRAVENOUS at 11:19

## 2020-08-27 RX ADMIN — SODIUM CHLORIDE, PRESERVATIVE FREE 10 ML: 5 INJECTION INTRAVENOUS at 02:22

## 2020-08-27 RX ADMIN — SODIUM CHLORIDE, PRESERVATIVE FREE 10 ML: 5 INJECTION INTRAVENOUS at 20:10

## 2020-08-27 RX ADMIN — DOCUSATE SODIUM 100 MG: 100 CAPSULE, LIQUID FILLED ORAL at 20:03

## 2020-08-27 RX ADMIN — POLYETHYLENE GLYCOL (3350) 17 G: 17 POWDER, FOR SOLUTION ORAL at 20:03

## 2020-08-27 RX ADMIN — IOPAMIDOL 80 ML: 755 INJECTION, SOLUTION INTRAVENOUS at 02:21

## 2020-08-27 RX ADMIN — MORPHINE SULFATE 4 MG: 4 INJECTION, SOLUTION INTRAMUSCULAR; INTRAVENOUS at 01:39

## 2020-08-27 RX ADMIN — PROMETHAZINE HYDROCHLORIDE 12.5 MG: 25 TABLET ORAL at 11:19

## 2020-08-27 RX ADMIN — DOCUSATE SODIUM 100 MG: 100 CAPSULE, LIQUID FILLED ORAL at 11:19

## 2020-08-27 RX ADMIN — ONDANSETRON 4 MG: 2 INJECTION INTRAMUSCULAR; INTRAVENOUS at 02:31

## 2020-08-27 RX ADMIN — ENOXAPARIN SODIUM 40 MG: 40 INJECTION SUBCUTANEOUS at 11:19

## 2020-08-27 RX ADMIN — FUROSEMIDE 20 MG: 10 INJECTION, SOLUTION INTRAVENOUS at 14:31

## 2020-08-27 ASSESSMENT — PAIN DESCRIPTION - LOCATION
LOCATION: ABDOMEN
LOCATION: ABDOMEN

## 2020-08-27 ASSESSMENT — PAIN DESCRIPTION - PAIN TYPE
TYPE: ACUTE PAIN
TYPE: ACUTE PAIN

## 2020-08-27 ASSESSMENT — PAIN SCALES - GENERAL
PAINLEVEL_OUTOF10: 0
PAINLEVEL_OUTOF10: 10
PAINLEVEL_OUTOF10: 0
PAINLEVEL_OUTOF10: 5

## 2020-08-27 ASSESSMENT — PAIN DESCRIPTION - ORIENTATION: ORIENTATION: MID

## 2020-08-27 NOTE — H&P
HISTORY AND PHYSICAL  (Hospitalist, Internal Medicine)  IDENTIFYING INFORMATION   PATIENT:  Hollie Emery  MRN:  [id number]  ADMIT DATE: 8/26/2020      CHIEF COMPLAINT   I do not feel right    HISTORY OF PRESENT ILLNESS   Hollie Emery is a 60 y.o. female with arthritis, irritable bowel disease, obesity, recent cholecystectomy 8/22/20 presented to ED with complaints of not feeling right since yesterday morning. Patient reports having discomfort/burning sensation in her esophagus, radiating up into her throat, also into her abdomen. Denied any right upper quadrant pain. Also reported shortness of breath. Patient reports she is unable to burp, feels like a bubble in her chest.  Denied any fever, but had sweating. Denied any chest pain, denied any urinary complaints, is constipated, last bowel movement was a week ago. Does not have an appetite, is nauseated. Has cough early morning. Patient reports having lower extremity swelling. Denied any prior cardiac history. At presentation patient was noted to have /70, HR 93, RR 25, temperature 98.5, saturating 90% on room air. Lab work significant for ALT 54, lipase 44, hemoglobin 11.4, platelets 973, UA not suggestive of infection. CT abdomen/pelvis-postoperative packing and/or postoperative fluid/hematoma in the gallbladder fossa measuring 5.7 cm into 3.6 cm. CTA chest-no evidence of PE, moderate diffuse pulmonary interstitial edema with suspected multifocal bilateral lung overlying alveolar edema, moderate cardiomegaly. Patient received IV azithromycin, IV morphine, Zofran in the ER. BNP, troponin pending. Patient is being tested for COVID. PAST MEDICAL HISTORY PAST SURGICAL HISTORY    arthritis, irritable bowel disease, obesity, recent cholecystectomy 8/22/20   cholecystectomy, hysterectomy   FAMILY HISTORY SOCIAL HISTORY    Reviewed and noncontributory   denies any smoking, alcohol, illicit drug abuse.   Patient reported smoking marijuana last Thursday   MEDICATIONS ALLERGIES    Was discharged on ciprofloxacin, metronidazole, Percocet. Patient is on acyclovir 200 mg twice daily, Wellbutrin 300 mg daily, loratadine 10 mg daily, Flexeril 10 mg 3 times daily as needed   Keflex. PAST MEDICAL, SURGICAL, FAMILY, and SOCIAL HISTORY         Past Medical History:   Diagnosis Date    H/O arthritis     09- Patient reports arthritis in Knee    History of IBS     09- Patient told she has IBS     Past Surgical History:   Procedure Laterality Date    CHOLECYSTECTOMY, LAPAROSCOPIC N/A 8/22/2020    CHOLECYSTECTOMY LAPAROSCOPIC performed by Srini Wall MD at [facility]  9-    HYSTERECTOMY      total     History reviewed. No pertinent family history.   Family Hx of HTN  Family Hx as reviewed above, otherwise non-contributory  Social History     Socioeconomic History    Marital status: Single     Spouse name: None    Number of children: None    Years of education: None    Highest education level: None   Occupational History    None   Social Needs    Financial resource strain: None    Food insecurity     Worry: None     Inability: None    Transportation needs     Medical: None     Non-medical: None   Tobacco Use    Smoking status: Never Smoker    Smokeless tobacco: Never Used   Substance and Sexual Activity    Alcohol use: Yes     Comment: rarely    Drug use: Yes     Types: Marijuana    Sexual activity: None   Lifestyle    Physical activity     Days per week: None     Minutes per session: None    Stress: None   Relationships    Social connections     Talks on phone: None     Gets together: None     Attends [employer] service: None     Active member of club or organization: None     Attends meetings of clubs or organizations: None     Relationship status: None    Intimate partner violence     Fear of current or ex partner: None     Emotionally abused: None     Physically abused: None     Forced sexual activity: None   Other Topics Concern    None   Social History Narrative    None       MEDICATIONS   Medications Prior to Admission  Not in a hospital admission. Current Medications  Current Facility-Administered Medications   Medication Dose Route Frequency Provider Last Rate Last Dose    0.9 % sodium chloride infusion   Intravenous Continuous Girtha Bucmadie, PA-C 100 mL/hr at 08/27/20 0139      ondansetron (ZOFRAN) injection 4 mg  4 mg Intravenous Q30 Min PRN Girtha Bucy, PA-C   4 mg at 08/27/20 0231    morphine sulfate (PF) injection 4 mg  4 mg Intravenous Q30 Min PRN Girtha Bucy, PA-C   4 mg at 08/27/20 0139    sodium chloride flush 0.9 % injection 10 mL  10 mL Intravenous BID Selina Corrigan MD   10 mL at 08/27/20 0222    azithromycin (ZITHROMAX) 500 mg in dextrose 5 % 250 mL IVPB  500 mg Intravenous Once Girtha Yg, PA-C         Current Outpatient Medications   Medication Sig Dispense Refill    ondansetron (ZOFRAN) 4 MG tablet Take 1 tablet by mouth 3 times daily as needed for Nausea or Vomiting 15 tablet 0    ciprofloxacin (CIPRO) 500 MG tablet Take 1 tablet by mouth 2 times daily for 5 days 10 tablet 0    metroNIDAZOLE (FLAGYL) 500 MG tablet Take 1 tablet by mouth 3 times daily for 5 days 15 tablet 0    oxyCODONE-acetaminophen (PERCOCET) 5-325 MG per tablet Take 1 tablet by mouth every 6 hours as needed for Pain for up to 5 days. Intended supply: 5 days. Take lowest dose possible to manage pain 14 tablet 0    acyclovir (ZOVIRAX) 800 MG tablet Take 200 mg by mouth 2 times daily      buPROPion (WELLBUTRIN XL) 300 MG extended release tablet Take 300 mg by mouth every morning      loratadine (CLARITIN) 10 MG capsule Take 10 mg by mouth daily      cyclobenzaprine (FLEXERIL) 10 MG tablet Take 10 mg by mouth 3 times daily as needed for Muscle spasms.  docusate sodium (COLACE) 100 MG capsule Take 100 mg by mouth 2 times daily as needed for Constipation. Allergies  Allergies   Allergen Reactions    Keflex [Cephalexin]        REVIEW OF SYSTEMS   Within above limitations. 14 point review of systems reviewed. Pertinent positive or negative as per HPI or otherwise negative per 14 point systems review. PHYSICAL EXAM     Wt Readings from Last 3 Encounters:   08/27/20 241 lb (109.3 kg)   08/24/20 241 lb (109.3 kg)   03/03/20 232 lb (105.2 kg)       Blood pressure 132/70, pulse 87, temperature 98.5 °F (36.9 °C), temperature source Oral, resp. rate 18, height 5' 8\" (1.727 m), weight 241 lb (109.3 kg), SpO2 100 %. GEN  -Awake, alert, NAD, uncomfortable. EYES   -PERRL. HENT  -MM are moist.   RESP  -LS CTA equal bilat, no wheezes, rales or rhonchi. Symmetric chest movement. No respiratory distress noted. C/V  -S1/S2 auscultated. RRR without appreciable M/R/G. No JVD or carotid bruits. Bilateral peripheral edema. No reproducible chest wall tenderness. GI  -Abdomen is soft, non-distended, no significant tenderness. No masses or guarding. + BS in all quadrants. Rectal exam deferred.   -No CVA tenderness. Childs catheter is not present. MS  -B/L extremities swelling, intact sensation symmetrical.   SKIN  -Normal coloration, warm, dry. NEURO  -CN 2-12 appear grossly intact, normal speech, no lateralizing weakness. PSYC  -Awake, alert, oriented x 4. Appropriate affect. LABS AND IMAGING     Results for Tatiana Reddy (MRN [id number]) as of 8/27/2020 05:09   Ref.  Range 8/27/2020 00:21   Sodium Latest Ref Range: 135 - 145 MMOL/L 139   Potassium Latest Ref Range: 3.5 - 5.1 MMOL/L 3.5   Chloride Latest Ref Range: 99 - 110 mMol/L 102   CO2 Latest Ref Range: 21 - 32 MMOL/L 24   BUN Latest Ref Range: 6 - 23 MG/DL 9   Creatinine Latest Ref Range: 0.6 - 1.1 MG/DL 0.7   Anion Gap Latest Ref Range: 4 - 16  13   GFR Non- Latest Ref Range: >60 mL/min/1.73m2 >60   GFR African American Latest Ref Range: >60 mL/min/1.73m2 >60   Glucose Latest Ref Range: 70 - 99 MG/ (H)   Calcium Latest Ref Range: 8.3 - 10.6 MG/DL 9.3   Total Protein Latest Ref Range: 6.4 - 8.2 GM/DL 6.8   Albumin Latest Ref Range: 3.4 - 5.0 GM/DL 3.8   Alk Phos Latest Ref Range: 40 - 128 IU/L 101   ALT Latest Ref Range: 10 - 40 U/L 54 (H)   AST Latest Ref Range: 15 - 37 IU/L 29   Bilirubin Latest Ref Range: 0.0 - 1.0 MG/DL 0.6   Lipase Latest Ref Range: 13 - 60 IU/L 44   hCG Quant Latest Units: UIU/ML 2.3   WBC Latest Ref Range: 4.0 - 10.5 K/CU MM 7.1   RBC Latest Ref Range: 4.2 - 5.4 M/CU MM 4.04 (L)   Hemoglobin Quant Latest Ref Range: 12.5 - 16.0 GM/DL 11.4 (L)   Hematocrit Latest Ref Range: 37 - 47 % 36.0 (L)   MCV Latest Ref Range: 78 - 100 FL 89.1   MCH Latest Ref Range: 27 - 31 PG 28.2   MCHC Latest Ref Range: 32.0 - 36.0 % 31.7 (L)   MPV Latest Ref Range: 7.5 - 11.1 FL 9.6   RDW Latest Ref Range: 11.7 - 14.9 % 13.3   Platelet Count Latest Ref Range: 140 - 440 K/CU    Lymphocyte % Latest Ref Range: 24 - 44 % 24.3   Monocytes % Latest Ref Range: 0 - 4 % 8.7 (H)   Eosinophils % Latest Ref Range: 0 - 3 % 2.2   Basophils % Latest Ref Range: 0 - 1 % 0.6   Lymphocytes Absolute Latest Units: K/CU MM 1.7   Monocytes Absolute Latest Units: K/CU MM 0.6   Eosinophils Absolute Latest Units: K/CU MM 0.2   Basophils Absolute Latest Units: K/CU MM 0.0   Differential Type Unknown AUTOMATED DIFFERENTIAL   Segs Relative Latest Ref Range: 36 - 66 % 63.9   Segs Absolute Latest Units: K/CU MM 4.6   Nucleated RBC % Latest Units: % 0.0   Immature Neutrophil % Latest Ref Range: 0 - 0.43 % 0.3   Total Immature Neutrophil Latest Units: K/CU MM 0.02   Total Nucleated RBC Latest Units: K/CU MM 0.0     Results for Bonna Tg (MRN [id number]) as of 8/27/2020 05:09   Ref.  Range 8/27/2020 00:37   Color, UA Latest Ref Range: YELLOW  YELLOW   Clarity, UA Latest Ref Range: CLEAR  CLEAR   Bilirubin, Urine Latest Ref Range: NEGATIVE MG/DL NEGATIVE   Ketones, Urine Latest Ref Range: NEGATIVE MG/DL NEGATIVE   Specific Gravity, UA Latest Ref Range: 1.001 - 1.035  1.000 (L)   Blood, Urine Latest Ref Range: NEGATIVE  NEGATIVE   Protein, UA Latest Ref Range: NEGATIVE MG/DL NEGATIVE   Urobilinogen, Urine Latest Ref Range: 0.2 - 1.0 MG/DL NORMAL   Leukocyte Esterase, Urine Latest Ref Range: NEGATIVE  NEGATIVE   Urinalysis Comments Unknown NONE   Glucose, Urine Latest Ref Range: NEGATIVE MG/DL NEGATIVE   Nitrite Urine, Quantitative Latest Ref Range: NEGATIVE  NEGATIVE   pH, Urine Latest Ref Range: 5.0 - 8.0  8.0   Cast Type Latest Ref Range: NO CAST FORMS SEEN /HPF NO CAST FORMS SEEN   WBC, UA Latest Ref Range: 0 - 5 /HPF <1   RBC, UA Latest Ref Range: 0 - 6 /HPF NONE   Epithelial Cells, UA Latest Units: /HPF 1   Bacteria, UA Latest Ref Range: NEGATIVE /HPF NEGATIVE   Crystal Type Latest Ref Range: NEGATIVE /HPF NEGATIVE     Recent Imaging    CT ABDOMEN PELVIS W IV CONTRAST [[id number]]  Collected: 08/27/20 0240        Order Status: Completed  Updated: 08/27/20 0252       Narrative:         EXAMINATION:   CT OF THE ABDOMEN AND PELVIS WITH CONTRAST 8/27/2020 2:22 am     TECHNIQUE:   CT of the abdomen and pelvis was performed with the administration of   intravenous contrast. Multiplanar reformatted images are provided for review. Dose modulation, iterative reconstruction, and/or weight based adjustment of   the mA/kV was utilized to reduce the radiation dose to as low as reasonably   achievable. COMPARISON:   CT abdomen/pelvis 08/21/2020 and MRI abdomen 08/21/2020     HISTORY:   ORDERING SYSTEM PROVIDED HISTORY: abdominal pain   TECHNOLOGIST PROVIDED HISTORY:   IV contrast only. Thank you. Reason for exam:->abdominal pain   Reason for exam:->IV contrast only. Thank you.    Reason for Exam: post op pain   Acuity: Acute   Type of Exam: Initial   Additional signs and symptoms: recent gall bladder surgery   Relevant Medical/Surgical History: isovue 370 80 ml     FINDINGS:   Lower Chest: Mild atelectasis at the lung exam:->sob   Reason for Exam: sob   Acuity: Acute   Type of Exam: Initial   Additional signs and symptoms: recent gall bladder surgery   Relevant Medical/Surgical History: isovue 370 80 ml     FINDINGS:   Pulmonary Arteries: Pulmonary arteries are adequately opacified for   evaluation.  No evidence of intraluminal filling defect to suggest pulmonary   embolism.  Main pulmonary artery is normal in caliber. Mediastinum: No evidence of mediastinal lymphadenopathy.  The heart is   moderately enlarged with otherwise unremarkable configuration.  No evidence   of pericardial effusion is seen. Michael Buckeye is no acute abnormality of the   thoracic aorta. Lungs/pleura: Scattered lung interlobular septal thickening is seen   consistent with moderate pulmonary interstitial edema with scattered airspace   disease seen throughout the lungs consistent with overlying alveolar edema. No evidence of pleural effusions are noted. Upper Abdomen: Limited images of the upper abdomen are unremarkable. Soft Tissues/Bones: No acute bone or soft tissue abnormality.       Impression:         1. No evidence of acute pulmonary embolism. 2. Moderate diffuse pulmonary interstitial edema with suspected multifocal   bilateral lung overlying alveolar edema. 3. Moderate cardiomegaly.           Relevant labs and imaging reviewed    ASSESSMENT AND PLAN     #. Acute respiratory failure with hypoxia:  -Patient was saturating 90% at presentation  -Patient was initially on 2 L of oxygen-saturating 100%. Patient was 100% on room air at the time of my interview. -CTA chest-no evidence of pulmonary embolism, moderate diffuse pulmonary interstitial edema with suspected multifocal bilateral lung overlying alveolar edema.  -Troponin, BNP pending.  -Patient is being tested for COVID. -Ordered Lasix 20 mg IV twice daily. #.  Presumptive COVID    #. Bilateral lower extremity edema:  -Moderate cardiomegaly, ?   Alveolar edema on CTA

## 2020-08-27 NOTE — ED NOTES
Report received from [address] [place] [address]; care assumed at this time.       Rosi Stewart RN  08/27/20 [id number]

## 2020-08-27 NOTE — ED PROVIDER NOTES
Triage Chief Complaint:   Abdominal Pain (gallbladder removed saturday morning) and Shortness of Breath    [place]:  Today in the ED I had the pleasure of caring for Zoe Santana who is a 60 y.o. female that presents today complaining of fatigue and abdominal pain. Pt is 5 days s/p lap jj. She states she has no apptite, she cannot eat, she has a sore throat that moves down to her stomach. She states she doesn't feel right, she states, I dont feel like im here. She endorses leg swelling and nausea. ROS:  REVIEW OF SYSTEMS    At least 10 systems reviewed      All other review of systems are negative  See HPI and nursing notes for additional information       Past Medical History:   Diagnosis Date    H/O arthritis     09- Patient reports arthritis in Knee    History of IBS     09- Patient told she has IBS     Past Surgical History:   Procedure Laterality Date    CHOLECYSTECTOMY, LAPAROSCOPIC N/A 8/22/2020    CHOLECYSTECTOMY LAPAROSCOPIC performed by Dallin Knott MD at [address] [place] [address]  9-    HYSTERECTOMY      total     History reviewed. No pertinent family history.   Social History     Socioeconomic History    Marital status: Single     Spouse name: Not on file    Number of children: Not on file    Years of education: Not on file    Highest education level: Not on file   Occupational History    Not on file   Social Needs    Financial resource strain: Not on file    Food insecurity     Worry: Not on file     Inability: Not on file    Transportation needs     Medical: Not on file     Non-medical: Not on file   Tobacco Use    Smoking status: Never Smoker    Smokeless tobacco: Never Used   Substance and Sexual Activity    Alcohol use: Yes     Comment: rarely    Drug use: Yes     Types: Marijuana    Sexual activity: Not on file   Lifestyle    Physical activity     Days per week: Not on file     Minutes per session: Not on file    Stress: Not on file Relationships    Social connections     Talks on phone: Not on file     Gets together: Not on file     Attends Church service: Not on file     Active member of club or organization: Not on file     Attends meetings of clubs or organizations: Not on file     Relationship status: Not on file    Intimate partner violence     Fear of current or ex partner: Not on file     Emotionally abused: Not on file     Physically abused: Not on file     Forced sexual activity: Not on file   Other Topics Concern    Not on file   Social History Narrative    Not on file     Current Facility-Administered Medications   Medication Dose Route Frequency Provider Last Rate Last Dose    0.9 % sodium chloride infusion   Intravenous Continuous RICARDA Mendez-C 100 mL/hr at 08/27/20 0139      ondansetron (ZOFRAN) injection 4 mg  4 mg Intravenous Q30 Min PRN Maricruz Donovan PA-C   4 mg at 08/27/20 0231    morphine sulfate (PF) injection 4 mg  4 mg Intravenous Q30 Min PRN Maricruz Donovan PA-C   4 mg at 08/27/20 0139    sodium chloride flush 0.9 % injection 10 mL  10 mL Intravenous BID Maricruz Donovan PA-C   10 mL at 08/27/20 0222    azithromycin (ZITHROMAX) 500 mg in dextrose 5 % 250 mL IVPB  500 mg Intravenous Once Chelie KELLIE DonovanC         Current Outpatient Medications   Medication Sig Dispense Refill    ondansetron (ZOFRAN) 4 MG tablet Take 1 tablet by mouth 3 times daily as needed for Nausea or Vomiting 15 tablet 0    ciprofloxacin (CIPRO) 500 MG tablet Take 1 tablet by mouth 2 times daily for 5 days 10 tablet 0    metroNIDAZOLE (FLAGYL) 500 MG tablet Take 1 tablet by mouth 3 times daily for 5 days 15 tablet 0    oxyCODONE-acetaminophen (PERCOCET) 5-325 MG per tablet Take 1 tablet by mouth every 6 hours as needed for Pain for up to 5 days. Intended supply: 5 days.  Take lowest dose possible to manage pain 14 tablet 0    acyclovir (ZOVIRAX) 800 MG tablet Take 200 mg by mouth 2 times daily  buPROPion (WELLBUTRIN XL) 300 MG extended release tablet Take 300 mg by mouth every morning      loratadine (CLARITIN) 10 MG capsule Take 10 mg by mouth daily      cyclobenzaprine (FLEXERIL) 10 MG tablet Take 10 mg by mouth 3 times daily as needed for Muscle spasms.  docusate sodium (COLACE) 100 MG capsule Take 100 mg by mouth 2 times daily as needed for Constipation. Allergies   Allergen Reactions    Keflex [Cephalexin]        Nursing Notes Reviewed    Physical Exam:  ED Triage Vitals [08/27/20 0033]   Enc Vitals Group      /74      Pulse 94      Resp 23      Temp 98.5 °F (36.9 °C)      Temp Source Oral      SpO2 94 %      Weight 241 lb (109.3 kg)      Height 5' 8\" (1.727 m)      Head Circumference       Peak Flow       Pain Score       Pain Loc       Pain Edu? Excl. in [address]? General :Patient is awake alert oriented person place and time no acute distress nontoxic appearing  HEENT: Pupils are equally round and reactive to light extraocular motors are intact conjunctivae clear sclerae white there is no injection no icterus. Nose without any rhinorrhea or epistaxis. Oral mucosa is moist no exudate buccal mucosa shows no ulcerations. Uvula is midline    Neck: Neck is supple full range of motion trachea midline thyroid nonpalpable  Cardiac: Heart regular rate rhythm no murmurs rubs clicks or gallops  Lungs: Lungs are clear to auscultation there is no wheezing rhonchi or rales. There is no use of accessory muscles no nasal flaring identified. Chest wall: There is no tenderness to palpation over the chest wall or over ribs  Abdomen: Abdomen is soft diffusely tender nondistended. There is no firm or pulsatile masses no rebound rigidity or guarding negative Dunaway's negative McBurney, no peritoneal signs. Upper scopic incisions signs are clean dry and intact. Multiple laparoscopic incision noted. Clean dry and intact. Overlying skin glue noted.   Suprapubic:  there is no tenderness to palpation over the external bladder   Musculoskeletal: 5 out of 5 strength in all 4 extremities full flexion extension abduction and adduction supination pronation of all extremities and all digits. No obvious muscle atrophy is noted. No focal muscle deficits are appreciated  Dermatology: Skin is warm and dry there is no obvious abscesses lacerations or lesions noted  Psych: Mentation is grossly normal cognition is grossly normal. Affect is appropriate  Neuro: Motor intact sensory intact cranial nerves II through XII are intact level of consciousness is normal cerebellar function is normal reflexes are grossly normal. No evidence of incontinence or loss of bowel or bladder no saddle anesthesia noted Lymphatic: There is no submandibular or cervical adenopathy appreciated.         I have reviewed and interpreted all of the currently available lab results from this visit (if applicable):  Results for orders placed or performed during the hospital encounter of 08/26/20   CBC Auto Differential   Result Value Ref Range    WBC 7.1 4.0 - 10.5 K/CU MM    RBC 4.04 (L) 4.2 - 5.4 M/CU MM    Hemoglobin 11.4 (L) 12.5 - 16.0 GM/DL    Hematocrit 36.0 (L) 37 - 47 %    MCV 89.1 78 - 100 FL    MCH 28.2 27 - 31 PG    MCHC 31.7 (L) 32.0 - 36.0 %    RDW 13.3 11.7 - 14.9 %    Platelets 557 039 - 449 K/CU MM    MPV 9.6 7.5 - 11.1 FL    Differential Type AUTOMATED DIFFERENTIAL     Segs Relative 63.9 36 - 66 %    Lymphocytes % 24.3 24 - 44 %    Monocytes % 8.7 (H) 0 - 4 %    Eosinophils % 2.2 0 - 3 %    Basophils % 0.6 0 - 1 %    Segs Absolute 4.6 K/CU MM    Lymphocytes Absolute 1.7 K/CU MM    Monocytes Absolute 0.6 K/CU MM    Eosinophils Absolute 0.2 K/CU MM    Basophils Absolute 0.0 K/CU MM    Nucleated RBC % 0.0 %    Total Nucleated RBC 0.0 K/CU MM    Total Immature Neutrophil 0.02 K/CU MM    Immature Neutrophil % 0.3 0 - 0.43 %   Comprehensive Metabolic Panel   Result Value Ref Range    Sodium 139 135 - 145 MMOL/L    Potassium 3.5 3.5 - 5. 1 MMOL/L    Chloride 102 99 - 110 mMol/L    CO2 24 21 - 32 MMOL/L    BUN 9 6 - 23 MG/DL    CREATININE 0.7 0.6 - 1.1 MG/DL    Glucose 114 (H) 70 - 99 MG/DL    Calcium 9.3 8.3 - 10.6 MG/DL    Alb 3.8 3.4 - 5.0 GM/DL    Total Protein 6.8 6.4 - 8.2 GM/DL    Total Bilirubin 0.6 0.0 - 1.0 MG/DL    ALT 54 (H) 10 - 40 U/L    AST 29 15 - 37 IU/L    Alkaline Phosphatase 101 40 - 128 IU/L    GFR Non-African American >60 >60 mL/min/1.73m2    GFR African American >60 >60 mL/min/1.73m2    Anion Gap 13 4 - 16   Urinalysis   Result Value Ref Range    Color, UA YELLOW YELLOW    Clarity, UA CLEAR CLEAR    Glucose, Urine NEGATIVE NEGATIVE MG/DL    Bilirubin Urine NEGATIVE NEGATIVE MG/DL    Ketones, Urine NEGATIVE NEGATIVE MG/DL    Specific Gravity, UA 1.000 (L) 1.001 - 1.035    Blood, Urine NEGATIVE NEGATIVE    pH, Urine 8.0 5.0 - 8.0    Protein, UA NEGATIVE NEGATIVE MG/DL    Urobilinogen, Urine NORMAL 0.2 - 1.0 MG/DL    Nitrite Urine, Quantitative NEGATIVE NEGATIVE    Leukocyte Esterase, Urine NEGATIVE NEGATIVE    RBC, UA NONE 0 - 6 /HPF    WBC, UA <1 0 - 5 /HPF    Epithelial Cells, UA 1 /HPF    Cast Type NO CAST FORMS SEEN NO CAST FORMS SEEN /HPF    Bacteria, UA NEGATIVE NEGATIVE /HPF    Crystal Type NEGATIVE NEGATIVE /HPF    Urinalysis Comments NONE    HCG, Quantitative, Pregnancy   Result Value Ref Range    hCG Quant 2.3 UIU/ML   Lipase   Result Value Ref Range    Lipase 44 13 - 60 IU/L      Radiographs (if obtained):  [] The following radiograph was interpreted by myself in the absence of a radiologist:   [] Radiologist's Report Reviewed:  CTA PULMONARY W CONTRAST   Final Result   1. No evidence of acute pulmonary embolism. 2. Moderate diffuse pulmonary interstitial edema with suspected multifocal   bilateral lung overlying alveolar edema. 3. Moderate cardiomegaly.          CT ABDOMEN PELVIS W IV CONTRAST   Final Result   Postoperative packing and/or postoperative fluid/hematoma in the gallbladder   fossa measuring 5.7 cm x is no pancreatic mass identified. There is no abnormal fluid collection identified. Pancreas enhances normally. The superior mesenteric vein is patent. The gallbladder wall is thickened. Known gallstones on the prior ultrasound are not evident on the current CT scan. The common bile duct is dilated. There is diffuse hepatic steatosis. The spleen and adrenal glands are normal in appearance. The kidneys are normal in appearance. GI/Bowel: There are no abnormally dilated loops of large or small bowel present. There is no mesenteric inflammatory stranding present. The appendix is normal.  A fat containing umbilical hernia is noted. Pelvis: The urinary bladder is normal in appearance. There is no free fluid or pelvic mass. Peritoneum/Retroperitoneum: There is no pathologically enlarged lymphadenopathy present. Bones/Soft Tissues: No lytic or blastic osseous lesions are identified. 1. Abnormal peripancreatic inflammatory stranding surrounding the head of the pancreas consistent with acute pancreatitis. No pseudocyst or pancreatic necrosis evident. 2. Gallbladder wall thickening, likely secondarily inflamed. Known gallstones are not evident though present on the prior CT. 3. Hepatic steatosis. Xr Chest Portable    Result Date: 8/21/2020  EXAMINATION: ONE XRAY VIEW OF THE CHEST 8/21/2020 12:12 pm COMPARISON: 03/03/2020 HISTORY: ORDERING SYSTEM PROVIDED HISTORY: back pain, nausesa TECHNOLOGIST PROVIDED HISTORY: Reason for exam:->back pain, nausesa Reason for Exam: back pain, nausea FINDINGS: The lungs are without acute focal process. There is no effusion or pneumothorax. The cardiomediastinal silhouette is without acute process. The osseous structures are without acute process. No acute process.      Mri Abdomen Wo Contrast Mrcp    Result Date: 8/21/2020  EXAMINATION: MRI OF THE ABDOMEN WITHOUT CONTRAST AND MRCP 8/21/2020 3:49 pm TECHNIQUE: Multiplanar multisequence MRI of the abdomen was performed without the administration of intravenous contrast.  After initial T2 axial and coronal images, thick slab, thin slab and 3D coronal MRCP sequences were obtained without the administration of intravenous contrast.  T1 weighted in/out of phase axial images also acquired. MIP images are provided for review. COMPARISON: CT and ultrasound 08/21/2022 HISTORY: ORDERING SYSTEM PROVIDED HISTORY: abnormal us, per Stephanie TECHNOLOGIST PROVIDED HISTORY: Reason for exam:->abnormal us, per Jean-Paul and Kirk Reason for Exam: abnormal us, per Jean-Paul and Kirk Acuity: Acute Type of Exam: Initial Relevant Medical/Surgical History: none FINDINGS: Liver size normal.  Patchy hepatic steatosis. No focal liver lesions although sensitivity is decreased in the absence of IV contrast.  The spleen, kidneys, adrenal glands show normal unenhanced signal.  Normal T1 and T2 signal of the pancreas. Subtle peripancreatic stranding evident on the comparison CT is manifested by traces of peripancreatic T2 hyperintensity representing edema and can be seen in the setting of acute pancreatitis. No peripancreatic fluid collections. Gallbladder: Cholelithiasis. No appreciable wall thickening. Bile Ducts: Intrahepatic biliary ducts not dilated. Mild dilatation of the common bile duct which measures 8 mm in diameter. No choledocholithiasis. Pancreatic Duct: Normal caliber pancreatic duct. No pancreas divisum. Other:  No free intraperitoneal fluid. Visualized GI tract unremarkable. Bone marrow signal age appropriate. Degenerative changes in the spine with some disc bulges at L1-L2 and L2-L3. 1. Limited evaluation for masses in the absence of IV contrast. 2. Patchy hepatic steatosis. 3. Cholelithiasis without MR evidence for acute cholecystitis. 4. Mild CBD dilatation measuring 8 mm. 5. Subtle peripancreatic edema which can be seen in the setting of acute pancreatitis. No peripancreatic fluid collections.      Us Abdomen Limited    Result Date: 8/21/2020  EXAMINATION: RIGHT UPPER QUADRANT ULTRASOUND 8/21/2020 1:02 pm COMPARISON: None HISTORY: ORDERING SYSTEM PROVIDED HISTORY: RUQ pain TECHNOLOGIST PROVIDED HISTORY: Reason for exam:->RUQ pain Reason for Exam: RUQ pain Acuity: Acute Type of Exam: Initial Additional signs and symptoms: nausea and emesis Relevant Medical/Surgical History: nk FINDINGS: LIVER:  There is coarse abnormal echotexture throughout liver. No focal liver lesions are identified. BILIARY SYSTEM:  Multiple gallstones are present. There is abnormal comet tail artifacts extending from the gallbladder wall which is mildly thickened. Sonographic Dunaway's sign was negative. The common bile duct is dilated measuring 9 mm. RIGHT KIDNEY: The right kidney is grossly unremarkable without evidence of hydronephrosis. PANCREAS:  Pancreatic duct is mildly dilated. Limited evaluation of the pancreas is otherwise unremarkable. OTHER: There is no free fluid identified. 1. Abnormal appearance of the gallbladder with cholelithiasis and mild gallbladder wall thickening. There are several comet tail artifacts, likely representing adenomyomatosis. Emphysematous cholecystitis is considered less likely however given the patient's symptoms, CT of the abdomen and pelvis with contrast is recommended for further evaluation. 2. Mild dilatation of the common bile duct and pancreatic duct. This can be further evaluated with CT of the abdomen and pelvis with contrast as well. The above findings were discussed with Gasper CHOWDARY at 1:50 p.m. on 08/21/2020. MDM:   Pt presents today to the ED. Nondistressed. Vital signs do reveal a little bit of a softer blood pressure. However that is been pretty stable throughout her stay. Otherwise vital signs look good. She patient's chest x-ray does reveal possible multifocal pneumonia so antibiotics are provided here in the ED.   CT scan abdomen pelvis does reveal a postop hematoma in the gallbladder. Patient's abdominal exam is benign. I have low suspicion of acute infectious process or abscess. There is no evidence of bowel leak noted on CT scan. Patient's labs do reveal no leukocytosis no bandemia. BNP is a bit elevated here in the ED. However given that patient's blood pressure is on the softer side. Will not start diuretics here in the ED    On-call physician  Was consulted regarding patient. After thorough discussion regarding patient's history, physical exam.  laboratory values, radiographic evidence (if applicable  theymyself as well as my attending physician agreed Given the patient's presenting concerns, medical history and clinical findings, the patient will be admitted at this time to undergo further evaluation and disposition. . During patient's entire stay in the ED patient remained stable and comfortable. Analgesia is well-controlled. Patient will be admitted for all information regarding ongoing management and care of patient please see note of Admitting physician. All EKG interpretations are performed by Attending physician         I independently managed patient today in the ED=        /70   Pulse 87   Temp 98.5 °F (36.9 °C) (Oral)   Resp 18   Ht 5' 8\" (1.727 m)   Wt 241 lb (109.3 kg)   SpO2 100%   BMI 36.64 kg/m²       Clinical Impression:  1. Hypoxia    2. Generalized abdominal pain        Disposition referral (if applicable):  No follow-up provider specified. Disposition medications (if applicable):  New Prescriptions    No medications on file         Comment: Please note this report has been produced using speech recognition software and may contain errors related to that system including errors in grammar, punctuation, and spelling, as well as words and phrases that may be inappropriate. If there are any questions or concerns please feel free to contact the dictating provider for clarification.       ARLEEN Haile

## 2020-08-27 NOTE — CONSULTS
CARDIOLOGY CONSULT NOTE   Reason for consultation:  CARDIOMYPATHY, LVEF 15-20%    Referring physician:  Tanmay Alva MD     Primary care physician: Layla Concepcion - ALANA      Dear Dr. Neal Harp  Thanks for the consult. History of present illness:Sabine is a 60 y. o.year old who  presents with for shortness of breath which is severe, for few days, intermittent, self limiting, not associated with cough or fever, gets worse with activity and better with rest,she was recently discharge 3 days ago after treatment of acute pancreatitis with a lot of IVF, her ECho showed LVEF 15-20%, she has congestive heart failure with orthopnea, PND and leg swelling which is getting worse. Chief Complaint   Patient presents with    Abdominal Pain     gallbladder removed saturday morning    Shortness of Breath     Blood pressure, cholesterol, blood glucose and weight are well controlled. Past medical history:    has a past medical history of H/O arthritis and History of IBS. Past surgical history:   has a past surgical history that includes Colonoscopy (9-); Hysterectomy; and Cholecystectomy, laparoscopic (N/A, 8/22/2020). Social History:   reports that she has never smoked. She has never used smokeless tobacco. She reports current alcohol use. She reports current drug use. Drug: Marijuana.   Family history:   no family history of CAD, STROKE of DM    Allergies   Allergen Reactions    Keflex [Cephalexin]        morphine sulfate (PF) injection 4 mg, Q30 Min PRN  sodium chloride flush 0.9 % injection 10 mL, BID  sodium chloride flush 0.9 % injection 10 mL, 2 times per day  sodium chloride flush 0.9 % injection 10 mL, PRN  acetaminophen (TYLENOL) tablet 650 mg, Q6H PRN    Or  acetaminophen (TYLENOL) suppository 650 mg, Q6H PRN  polyethylene glycol (GLYCOLAX) packet 17 g, Daily PRN  promethazine (PHENERGAN) tablet 12.5 mg, Q6H PRN    Or  ondansetron (ZOFRAN) injection 4 mg, Q6H PRN  enoxaparin (LOVENOX) injection 40 mg, Daily  acyclovir (ZOVIRAX) capsule 200 mg, BID  buPROPion (WELLBUTRIN XL) extended release tablet 300 mg, QAM  docusate sodium (COLACE) capsule 100 mg, BID  polyethylene glycol (GLYCOLAX) packet 17 g, BID  metoclopramide (REGLAN) injection 10 mg, Q6H  pantoprazole (PROTONIX) tablet 40 mg, BID AC  furosemide (LASIX) injection 40 mg, BID      Current Facility-Administered Medications   Medication Dose Route Frequency Provider Last Rate Last Dose    morphine sulfate (PF) injection 4 mg  4 mg Intravenous Q30 Min PRN Roro Villegas PA-C   4 mg at 08/27/20 0139    sodium chloride flush 0.9 % injection 10 mL  10 mL Intravenous BID Abraham Park MD   10 mL at 08/27/20 0222    sodium chloride flush 0.9 % injection 10 mL  10 mL Intravenous 2 times per day Abraham Park MD        sodium chloride flush 0.9 % injection 10 mL  10 mL Intravenous PRN Abraham Park MD        acetaminophen (TYLENOL) tablet 650 mg  650 mg Oral Q6H PRN Abraham Park MD        Or   Weeks acetaminophen (TYLENOL) suppository 650 mg  650 mg Rectal Q6H PRN Abraham Park MD        polyethylene glycol (GLYCOLAX) packet 17 g  17 g Oral Daily PRN Abraham Park MD        promethazine (PHENERGAN) tablet 12.5 mg  12.5 mg Oral Q6H PRN Abraham Park MD   12.5 mg at 08/27/20 1119    Or    ondansetron (ZOFRAN) injection 4 mg  4 mg Intravenous Q6H PRN Abraham Park MD        enoxaparin (LOVENOX) injection 40 mg  40 mg Subcutaneous Daily Abraham Park MD   40 mg at 08/27/20 1119    acyclovir (ZOVIRAX) capsule 200 mg  200 mg Oral BID Abraham Park MD   200 mg at 08/27/20 1119    buPROPion (WELLBUTRIN XL) extended release tablet 300 mg  300 mg Oral TAMIKO Ac MD   300 mg at 08/27/20 1118    docusate sodium (COLACE) capsule 100 mg  100 mg Oral BID Abraham Park MD   100 mg at 08/27/20 1119    polyethylene glycol (GLYCOLAX) packet 17 g  17 g Oral BID Selina Corrigan MD   17 g at 08/27/20 1118    metoclopramide (REGLAN) injection 10 mg  10 mg Intravenous Q6H Yunior Grove MD   10 mg at 08/27/20 1431    pantoprazole (PROTONIX) tablet 40 mg  40 mg Oral BID AC Yunior Grove MD   40 mg at 08/27/20 1557    furosemide (LASIX) injection 40 mg  40 mg Intravenous BID Sweta White MD         Review of Systems:   · Constitutional: No Fever or Weight Loss   · Eyes: No Decreased Vision  · ENT: No Headaches, Hearing Loss or Vertigo  · Cardiovascular: No chest pain, dyspnea on exertion, palpitations or loss of consciousness  · Respiratory: No cough or wheezing    · Gastrointestinal: No abdominal pain, appetite loss, blood in stools, constipation, diarrhea or heartburn  · Genitourinary: No dysuria, trouble voiding, or hematuria  · Musculoskeletal:  No gait disturbance, weakness or joint complaints  · Integumentary: No rash or pruritis  · Neurological: No TIA or stroke symptoms  · Psychiatric: No anxiety or depression  · Endocrine: No malaise, fatigue or temperature intolerance  · Hematologic/Lymphatic: No bleeding problems, blood clots or swollen lymph nodes  · Allergic/Immunologic: No nasal congestion or hives  All systems negative except as marked. ·   ·      Physical Examination:    Vitals:    08/27/20 1601   BP: 131/69   Pulse: 93   Resp:    Temp: 98.6 °F (37 °C)   SpO2: 100%      Wt Readings from Last 3 Encounters:   08/27/20 232 lb (105.2 kg)   08/24/20 241 lb (109.3 kg)   03/03/20 232 lb (105.2 kg)     Body mass index is 35.28 kg/m². General Appearance:  No distress, conversant    Constitutional:  Well developed, Well nourished, No acute distress, Non-toxic appearance. HENT:  Normocephalic, Atraumatic, Bilateral external ears normal, Oropharynx moist, No oral exudates, Nose normal. Neck- Normal range of motion, No tenderness, Supple, No stridor,no apical-carotid delay, no carotid bruit  Eyes:  PERRL, EOMI, Conjunctiva normal, No discharge. Respiratory:  Normal breath sounds, No respiratory distress, No wheezing, No chest tenderness. ,no use of accessory muscles, diaphragm movement is normal  Cardiovascular: (PMI) apex non displaced,no lifts no thrills, no s3,no s4, Normal heart rate, Normal rhythm, No murmurs, No rubs, No gallops. Carotid arteries pulse and amplitude are normal no bruit, no abdominal bruit noted ( normal abdominal aorta ausculation), femoral arteries pulse and amplitude are normal no bruit, pedal pulses are normal  GI:  Bowel sounds normal, Soft, No tenderness, No masses, No pulsatile masses, no hepatosplenomegally, no bruits  : External genitalia appear normal, No masses or lesions. No discharge. No CVA tenderness. Musculoskeletal:  +  edema, No tenderness, No cyanosis, No clubbing. Good range of motion in all major joints. No tenderness to palpation or major deformities noted. Back- No tenderness. Integument:  Warm, Dry, No erythema, No rash. Skin: no rash, no ulcers  Lymphatic:  No lymphadenopathy noted. Neurologic:  Alert & oriented x 3, Normal motor function, Normal sensory function, No focal deficits noted. Psychiatric:  Affect normal, Judgment normal, Mood normal.   Lab Review   Recent Labs     08/27/20  0021   WBC 7.1   HGB 11.4*   HCT 36.0*         Recent Labs     08/27/20  0021 08/27/20  1623     --    K 3.5 3.6     --    CO2 24  --    BUN 9  --    CREATININE 0.7  --      Recent Labs     08/27/20  0021   AST 29   ALT 54*   BILITOT 0.6   ALKPHOS 101     No results for input(s): TROPONINI in the last 72 hours. No results found for: BNP  Lab Results   Component Value Date    INR 1.19 08/27/2020    PROTIME 14.4 08/27/2020         EKG:nsr    Chest CT\"  1. No evidence of acute pulmonary embolism. 2. Moderate diffuse pulmonary interstitial edema with suspected multifocal    bilateral lung overlying alveolar edema. 3. Moderate cardiomegaly.           ECHO:lvef 15-20%  Labs, echo, meds reviewed  Assessment: 60 y. o.year old with PMH of  has a past medical history of H/O arthritis and History of IBS. Recommendations:    1. New onset acute decompensated CHF, patient was recently treated with IVF for acute pancreatitis and discharged 3 days ago, has severe dyspnea, PND and orthopnea and LVEF is 15-20%, donot suspect COVID 19, will recommend [facility] on Monday once COVID is ruled out,recommend to start lasix drip, add ,low dose toprol xl 12.5mg daily and once blood pressure is stable, will need to add ACE inhibitor, further management as per [facility]. 2. Pancreatitis: stable  3. IBS;' stable  All labs, medications and tests reviewed, continue all other medications of all above medical condition listed as is.          Leny Mejia MD, 8/27/2020 6:04 PM

## 2020-08-27 NOTE — PROGRESS NOTES
[address]    Hospitalist Progress Note      Name:  Mario Ramírez /Age/Sex: 1955  (60 y.o. female)   MRN & CSN:  [id number] & [id number] Admission Date/Time: 2020 11:52 PM   Location:  [address] [place] [address]-A PCP: Ivy Mares [facility] Day: 2    Assessment and Plan:   Mario Ramírez is a 60 y.o.  female  who presents with leg swelling, shortness of breath    Acute systolic CHF    Presented with peripheral edema, chest x-ray with pulmonary congestion  ECHO  reported with LVEF of 15 to 20% -no previous one for comparison  May need ischemia work-up-Per cardiology  Started on IV Lasix, will add Coreg and isinopril  Monitor I/Os and electrolytes, reduce salt intake  Consult cardiology    Suspected COVID 19     constitutional symptoms may not be suggestive, SARS-CoV-2 PCR pending    Severe cardiomyopathy     No previous ECHO to compare -LVEF found to be depressed 15 to 20%  May need ischemia work-up -defer to cardiology  May also need LifeVest before discharge    Abdominal pain with indigestion    Started on Reglan, Protonix twice daily    Recent cholecystectomy -stable, General surgery following    Chronic conditions    Irritable bowel disease  Depression    No respiratory failure -patient was 99% on 2 L and she is off oxygen now    Diet DIET GENERAL;   DVT Prophylaxis [] Lovenox, []  Heparin, [] SCDs, []No VTE prophylaxis, patient ambulating   GI Prophylaxis [] PPI, [] H2 Blocker, [] No GI prophylaxis, patient is receiving diet/Tube Feeds   Code Status Full Code   Disposition Patient requires continued admission due to CHF   MDM [] Low, [] Moderate,[]  High     History of Present Illness: Subjective     Patient Seen & Examined at the bedside     Patient resting with no distress while on room air  She complains of abdominal discomfort with indigestion feeling and reflux  Has tried various combination of medications and did not help  She has no vomiting    Ten point ROS reviewed negative, unless as noted above    Objective:   No intake or output data in the 24 hours ending 08/27/20 1520   Vitals:   Vitals:    08/27/20 1002   BP: 119/74   Pulse: 85   Resp: 21   Temp: 98.4 °F (36.9 °C)   SpO2: 98%     Physical Exam:    GEN Awake female, resting in bed in no apparent distress. Appears given age. HENT Mucous membranes are moist.   RESP Clear to auscultation, no wheezes, rales or rhonchi. CARDIO/VASC -S1/S2 auscultated. Regular rate without appreciable murmurs, rubs, or gallops. Peripheral pulses equal bilaterally and palpable. No peripheral edema. GI Abdomen is soft without significant tenderness, masses, or guarding. Bowel sounds are normoactive. Rectal exam deferred.  Childs catheter is not present. MSK No gross joint deformities.  Spontaneous movement of all extremities    Medications:   Medications:    sodium chloride flush  10 mL Intravenous BID    sodium chloride flush  10 mL Intravenous 2 times per day    enoxaparin  40 mg Subcutaneous Daily    acyclovir  200 mg Oral BID    buPROPion  300 mg Oral QAM    docusate sodium  100 mg Oral BID    polyethylene glycol  17 g Oral BID    metoclopramide  10 mg Intravenous Q6H    pantoprazole  40 mg Oral BID AC    furosemide  40 mg Intravenous BID      Infusions:   PRN Meds: morphine, 4 mg, Q30 Min PRN  sodium chloride flush, 10 mL, PRN  acetaminophen, 650 mg, Q6H PRN    Or  acetaminophen, 650 mg, Q6H PRN  polyethylene glycol, 17 g, Daily PRN  promethazine, 12.5 mg, Q6H PRN    Or  ondansetron, 4 mg, Q6H PRN          Electronically signed by Marcia Huerta MD on 8/27/2020 at 12:20 PM

## 2020-08-27 NOTE — CONSULTS
Department of General Surgery   Surgical Service Dr. Seward Boeck   Consult Note    Date of Consult: 8/27/20    Reason for Consult:  Nausea, burning in chest, s/p laparoscopic cholecystectomy  Requesting Physician:  Dr. Biju Sage:  Nausea, burning in chest    History Obtained From:  patient    HISTORY OF PRESENT ILLNESS:    The patient is a 60 y.o. female who presented with nausea. She called my office late in the day yesterday with complaints of nausea and not \"feeling right\" but I did not get the message until this morning. Kezia Diggs reports a burning sensation in her chest and having dry heaves but not vomiting. Symptoms started yesterday. No fevers or chills. She feels her mouth is dry despite taking fluids at home and urinating without difficulty. She also reports some edema in her legs and mild shortness of breath. Workup in the ED reveals normal WBC, normal LFTs and lipase. CT abdomen with some fluid in the gallbladder fossa and the surgicel snow hemostatic agent that I left there during the case due to her ooziness. This looks as I would expect it to look post operatively.         Past Medical History:    Past Medical History:   Diagnosis Date    H/O arthritis     09- Patient reports arthritis in Knee    History of IBS     09- Patient told she has IBS       Past Surgical History:    Past Surgical History:   Procedure Laterality Date    CHOLECYSTECTOMY, LAPAROSCOPIC N/A 8/22/2020    CHOLECYSTECTOMY LAPAROSCOPIC performed by Zaria Kim MD at [facility]  9-    HYSTERECTOMY      total       Current Medications:   Current Facility-Administered Medications   Medication Dose Route Frequency Provider Last Rate Last Dose    morphine sulfate (PF) injection 4 mg  4 mg Intravenous Q30 Min PRN Eleanor Jameson PA-C   4 mg at 08/27/20 0139    sodium chloride flush 0.9 % injection 10 mL  10 mL Intravenous BID Charmayne Luna, MD   10 mL at 08/27/20 0222    acetaminophen (TYLENOL) tablet 650 mg  650 mg Oral Q6H PRN Michelle Fontana MD        Or   [facility] acetaminophen (TYLENOL) suppository 650 mg  650 mg Rectal Q6H PRN Michelle Fontana MD        promethazine (PHENERGAN) tablet 12.5 mg  12.5 mg Oral Q6H PRN Michelle Fontana MD        Or    ondansetron (ZOFRAN) injection 4 mg  4 mg Intravenous Q6H PRN Michelle Fontana MD        enoxaparin (LOVENOX) injection 40 mg  40 mg Subcutaneous Daily Michelle Fontana MD        acyclovir (ZOVIRAX) tablet 400 mg  400 mg Oral BID Michelle Fontana MD        buPROPion (WELLBUTRIN XL) extended release tablet 300 mg  300 mg Oral QAM Michelle Fontana MD        docusate sodium (COLACE) capsule 100 mg  100 mg Oral BID Michelle Fontnaa MD        furosemide (LASIX) injection 20 mg  20 mg Intravenous BID Michelle Fontana MD         Current Outpatient Medications   Medication Sig Dispense Refill    ondansetron (ZOFRAN) 4 MG tablet Take 1 tablet by mouth 3 times daily as needed for Nausea or Vomiting 15 tablet 0    ciprofloxacin (CIPRO) 500 MG tablet Take 1 tablet by mouth 2 times daily for 5 days 10 tablet 0    metroNIDAZOLE (FLAGYL) 500 MG tablet Take 1 tablet by mouth 3 times daily for 5 days 15 tablet 0    oxyCODONE-acetaminophen (PERCOCET) 5-325 MG per tablet Take 1 tablet by mouth every 6 hours as needed for Pain for up to 5 days. Intended supply: 5 days. Take lowest dose possible to manage pain 14 tablet 0    acyclovir (ZOVIRAX) 800 MG tablet Take 200 mg by mouth 2 times daily      buPROPion (WELLBUTRIN XL) 300 MG extended release tablet Take 300 mg by mouth every morning      loratadine (CLARITIN) 10 MG capsule Take 10 mg by mouth daily      cyclobenzaprine (FLEXERIL) 10 MG tablet Take 10 mg by mouth 3 times daily as needed for Muscle spasms.  docusate sodium (COLACE) 100 MG capsule Take 100 mg by mouth 2 times daily as needed for Constipation.          Allergies:  Keflex [cephalexin]    Social History:   Social History     Socioeconomic History    Marital status: Single     Spouse name: None    Number of children: None    Years of education: None    Highest education level: None   Occupational History    None   Social Needs    Financial resource strain: None    Food insecurity     Worry: None     Inability: None    Transportation needs     Medical: None     Non-medical: None   Tobacco Use    Smoking status: Never Smoker    Smokeless tobacco: Never Used   Substance and Sexual Activity    Alcohol use: Yes     Comment: rarely    Drug use: Yes     Types: Marijuana    Sexual activity: None   Lifestyle    Physical activity     Days per week: None     Minutes per session: None    Stress: None   Relationships    Social connections     Talks on phone: None     Gets together: None     Attends Cheondoism service: None     Active member of club or organization: None     Attends meetings of clubs or organizations: None     Relationship status: None    Intimate partner violence     Fear of current or ex partner: None     Emotionally abused: None     Physically abused: None     Forced sexual activity: None   Other Topics Concern    None   Social History Narrative    None       Family History:   History reviewed. No pertinent family history. REVIEW OFSYSTEMS:    Review of Systems   Constitutional: Negative for chills. Negative for fever. HENT: Negative for congestion. Negative for rhinorrhea. Respiratory: Negative for cough. Reports mild shortness of breath  Cardiovascular: Negative for chest pain. Gastrointestinal: nausea, dry heaving, abdominal pain and burning in esophagus as per HPI. Genitourinary: Negative for difficulty urinating. Neurological: Negative for dizziness, syncope and numbness.    Hematological: does bleed easily      PHYSICAL EXAM:  Vitals:    08/27/20 0631 08/27/20 0731 08/27/20 0757 08/27/20 0801   BP: 125/79 120/73 122/72 125/73   Pulse: 89 88 86 86   Resp: 18 17 20 26   Temp:       TempSrc:       SpO2: 99% 99% 97% 99%   Weight:       Height:           Physical Exam  General: awake, alert, in no acute distress  HEENT: mucous membranes moist  Respiratory: normal effort, no wheezes appreciated  CV: appears well perfused, regular rate and rhythm  Abdomen: Soft, appropriately tender, non-distended. No guarding or rebound tenderness. Port sites intact with dermabond and mild ecchymosis adjacent to incisions  Skin: warm and dry  Extremities: atraumatic, mild pitting edema below the knee, no calf pain  Neuro: no focal deficits noted  Psych: mood normal        DATA:    Lab Results   Component Value Date    WBC 7.1 08/27/2020    HGB 11.4 (L) 08/27/2020    HCT 36.0 (L) 08/27/2020    MCV 89.1 08/27/2020     08/27/2020     Lab Results   Component Value Date     08/27/2020    K 3.5 08/27/2020     08/27/2020    CO2 24 08/27/2020    BUN 9 08/27/2020    CREATININE 0.7 08/27/2020    GLUCOSE 114 08/27/2020    CALCIUM 9.3 08/27/2020    CT abd/pelvis  Postoperative packing and/or postoperative fluid/hematoma in the gallbladder    fossa measuring 5.7 cm x 3.6 cm.  Clinical correlation and follow-up    recommended. Charlynne Plane is no free fluid in the peritoneal cavity to suggest a    bile leak.         Otherwise normal/expected postoperative changes in the anterior abdominal    wall.         CT chest  1. No evidence of acute pulmonary embolism. 2. Moderate diffuse pulmonary interstitial edema with suspected multifocal    bilateral lung overlying alveolar edema. 3. Moderate cardiomegaly. IMPRESSION:    60 y.o. female post op day 5 from laparoscopic cholecystectomy with abdominal discomfort, nausea, shortness of breath and edema. Abdominal workup and exam appear normal at the moment for her post operative status. She does have some edema that is likely contributing to her shortness of breath.     Patient Active Problem List:     Acute pancreatitis due to calculus of common bile duct     Acute respiratory failure with hypoxia (HCC)        PLAN:  Agree with diuresis  CTA negative for PE but will check lower extremity US to rule out DVT as well.   Serial abdominal exams  - will continue to follow          Electronically signed by Lucita Bryant MD on 8/27/2020 at 8:48 AM

## 2020-08-28 LAB
ANION GAP SERPL CALCULATED.3IONS-SCNC: 15 MMOL/L (ref 4–16)
APTT: 35.2 SECONDS (ref 25.1–37.1)
BASOPHILS ABSOLUTE: 0.1 K/CU MM
BASOPHILS RELATIVE PERCENT: 0.9 % (ref 0–1)
BUN BLDV-MCNC: 7 MG/DL (ref 6–23)
CALCIUM SERPL-MCNC: 9.1 MG/DL (ref 8.3–10.6)
CHLORIDE BLD-SCNC: 95 MMOL/L (ref 99–110)
CO2: 30 MMOL/L (ref 21–32)
CREAT SERPL-MCNC: 0.8 MG/DL (ref 0.6–1.1)
D DIMER: 1411 NG/ML(DDU)
DIFFERENTIAL TYPE: ABNORMAL
EKG ATRIAL RATE: 88 BPM
EKG DIAGNOSIS: NORMAL
EKG P AXIS: 40 DEGREES
EKG P-R INTERVAL: 126 MS
EKG Q-T INTERVAL: 362 MS
EKG QRS DURATION: 98 MS
EKG QTC CALCULATION (BAZETT): 438 MS
EKG R AXIS: -6 DEGREES
EKG T AXIS: 110 DEGREES
EKG VENTRICULAR RATE: 88 BPM
EOSINOPHILS ABSOLUTE: 0.2 K/CU MM
EOSINOPHILS RELATIVE PERCENT: 2.9 % (ref 0–3)
FIBRINOGEN LEVEL: 477 MG/DL (ref 196.9–442.1)
GFR AFRICAN AMERICAN: >60 ML/MIN/1.73M2
GFR NON-AFRICAN AMERICAN: >60 ML/MIN/1.73M2
GLUCOSE BLD-MCNC: 101 MG/DL (ref 70–99)
HCT VFR BLD CALC: 38.2 % (ref 37–47)
HEMOGLOBIN: 11.9 GM/DL (ref 12.5–16)
IMMATURE NEUTROPHIL %: 0.5 % (ref 0–0.43)
INR BLD: 1.16 INDEX
LYMPHOCYTES ABSOLUTE: 1.6 K/CU MM
LYMPHOCYTES RELATIVE PERCENT: 24.5 % (ref 24–44)
MAGNESIUM: 1.9 MG/DL (ref 1.8–2.4)
MCH RBC QN AUTO: 27.6 PG (ref 27–31)
MCHC RBC AUTO-ENTMCNC: 31.2 % (ref 32–36)
MCV RBC AUTO: 88.6 FL (ref 78–100)
MONOCYTES ABSOLUTE: 0.6 K/CU MM
MONOCYTES RELATIVE PERCENT: 8.6 % (ref 0–4)
NUCLEATED RBC %: 0 %
PDW BLD-RTO: 13.5 % (ref 11.7–14.9)
PLATELET # BLD: 237 K/CU MM (ref 140–440)
PMV BLD AUTO: 9.6 FL (ref 7.5–11.1)
POTASSIUM SERPL-SCNC: 3.4 MMOL/L (ref 3.5–5.1)
PROTHROMBIN TIME: 14 SECONDS (ref 11.7–14.5)
RBC # BLD: 4.31 M/CU MM (ref 4.2–5.4)
SARS-COV-2: NOT DETECTED
SEGMENTED NEUTROPHILS ABSOLUTE COUNT: 4.1 K/CU MM
SEGMENTED NEUTROPHILS RELATIVE PERCENT: 62.6 % (ref 36–66)
SODIUM BLD-SCNC: 140 MMOL/L (ref 135–145)
SOURCE: NORMAL
TOTAL IMMATURE NEUTOROPHIL: 0.03 K/CU MM
TOTAL NUCLEATED RBC: 0 K/CU MM
WBC # BLD: 6.6 K/CU MM (ref 4–10.5)

## 2020-08-28 PROCEDURE — 6360000002 HC RX W HCPCS: Performed by: INTERNAL MEDICINE

## 2020-08-28 PROCEDURE — 99024 POSTOP FOLLOW-UP VISIT: CPT | Performed by: SURGERY

## 2020-08-28 PROCEDURE — 2580000003 HC RX 258: Performed by: INTERNAL MEDICINE

## 2020-08-28 PROCEDURE — 85610 PROTHROMBIN TIME: CPT

## 2020-08-28 PROCEDURE — 6370000000 HC RX 637 (ALT 250 FOR IP): Performed by: INTERNAL MEDICINE

## 2020-08-28 PROCEDURE — 94761 N-INVAS EAR/PLS OXIMETRY MLT: CPT

## 2020-08-28 PROCEDURE — 6370000000 HC RX 637 (ALT 250 FOR IP): Performed by: NURSE PRACTITIONER

## 2020-08-28 PROCEDURE — 80048 BASIC METABOLIC PNL TOTAL CA: CPT

## 2020-08-28 PROCEDURE — 1200000000 HC SEMI PRIVATE

## 2020-08-28 PROCEDURE — 85730 THROMBOPLASTIN TIME PARTIAL: CPT

## 2020-08-28 PROCEDURE — 85384 FIBRINOGEN ACTIVITY: CPT

## 2020-08-28 PROCEDURE — 85379 FIBRIN DEGRADATION QUANT: CPT

## 2020-08-28 PROCEDURE — 85025 COMPLETE CBC W/AUTO DIFF WBC: CPT

## 2020-08-28 PROCEDURE — 83735 ASSAY OF MAGNESIUM: CPT

## 2020-08-28 RX ADMIN — METOCLOPRAMIDE 10 MG: 5 INJECTION, SOLUTION INTRAMUSCULAR; INTRAVENOUS at 02:06

## 2020-08-28 RX ADMIN — POLYETHYLENE GLYCOL (3350) 17 G: 17 POWDER, FOR SOLUTION ORAL at 08:41

## 2020-08-28 RX ADMIN — METOCLOPRAMIDE 10 MG: 5 INJECTION, SOLUTION INTRAMUSCULAR; INTRAVENOUS at 08:40

## 2020-08-28 RX ADMIN — ACYCLOVIR 200 MG: 200 CAPSULE ORAL at 08:41

## 2020-08-28 RX ADMIN — ACYCLOVIR 200 MG: 200 CAPSULE ORAL at 20:36

## 2020-08-28 RX ADMIN — SODIUM CHLORIDE, PRESERVATIVE FREE 10 ML: 5 INJECTION INTRAVENOUS at 08:42

## 2020-08-28 RX ADMIN — ENOXAPARIN SODIUM 40 MG: 40 INJECTION SUBCUTANEOUS at 08:41

## 2020-08-28 RX ADMIN — METOCLOPRAMIDE 10 MG: 5 INJECTION, SOLUTION INTRAMUSCULAR; INTRAVENOUS at 20:36

## 2020-08-28 RX ADMIN — DOCUSATE SODIUM 100 MG: 100 CAPSULE, LIQUID FILLED ORAL at 08:41

## 2020-08-28 RX ADMIN — CALCIUM CARBONATE 500 MG: 500 TABLET, CHEWABLE ORAL at 01:02

## 2020-08-28 RX ADMIN — METOPROLOL SUCCINATE 12.5 MG: 25 TABLET, EXTENDED RELEASE ORAL at 08:42

## 2020-08-28 RX ADMIN — METOCLOPRAMIDE 10 MG: 5 INJECTION, SOLUTION INTRAMUSCULAR; INTRAVENOUS at 15:02

## 2020-08-28 RX ADMIN — SODIUM CHLORIDE, PRESERVATIVE FREE 10 ML: 5 INJECTION INTRAVENOUS at 08:41

## 2020-08-28 RX ADMIN — BUPROPION HYDROCHLORIDE 300 MG: 150 TABLET, EXTENDED RELEASE ORAL at 08:42

## 2020-08-28 RX ADMIN — FUROSEMIDE 5 MG/HR: 10 INJECTION, SOLUTION INTRAMUSCULAR; INTRAVENOUS at 12:57

## 2020-08-28 RX ADMIN — PANTOPRAZOLE SODIUM 40 MG: 40 TABLET, DELAYED RELEASE ORAL at 15:02

## 2020-08-28 RX ADMIN — PANTOPRAZOLE SODIUM 40 MG: 40 TABLET, DELAYED RELEASE ORAL at 05:47

## 2020-08-28 RX ADMIN — DOCUSATE SODIUM 100 MG: 100 CAPSULE, LIQUID FILLED ORAL at 20:36

## 2020-08-28 RX ADMIN — SODIUM CHLORIDE, PRESERVATIVE FREE 10 ML: 5 INJECTION INTRAVENOUS at 20:36

## 2020-08-28 ASSESSMENT — PAIN SCALES - GENERAL
PAINLEVEL_OUTOF10: 0

## 2020-08-28 NOTE — CARE COORDINATION
Chart reviewed. Patient is from home; appears independent prior to admission. She was recently hospitalized - lap jj. She has a PCP and insurance that assists with Rx when needed. No needs identified at this time. CM will remain available should needs arise.  Nhi Paulino RN

## 2020-08-28 NOTE — PROGRESS NOTES
GENERAL SURGERY PROGRESS NOTE    Danilo Tse is a [id number] [place] [address] y.o. female s/p laparoscopic cholecystectomy for gallstone pancreatitis on 8/22. Subjective:  Doing ok today. Up on commode when I saw her. Denies new complaints. On Covid floor but unlikely she has Covid.     Objective:    Vitals: VITALS:  /84   Pulse 83   Temp 98.5 °F (36.9 °C) (Oral)   Resp 22   Ht 5' 8\" (1.727 m)   Wt 221 lb 1.9 oz (100.3 kg)   SpO2 98%   BMI 33.62 kg/m²     I/O: 08/27 0701 - 08/28 0700  In: 250   Out: 4126 [[id number]]    Labs/Imaging Results:   Lab Results   Component Value Date     08/28/2020    K 3.4 08/28/2020    CL 95 08/28/2020    CO2 30 08/28/2020    BUN 7 08/28/2020    CREATININE 0.8 08/28/2020    GLUCOSE 101 08/28/2020    CALCIUM 9.1 08/28/2020      Lab Results   Component Value Date    WBC 6.6 08/28/2020    HGB 11.9 (L) 08/28/2020    HCT 38.2 08/28/2020    MCV 88.6 08/28/2020     08/28/2020       IV Fluids: furosemide (LASIX) 1mg/ml infusion Last Rate: 5 mg/hr (08/27/20 1852)    Scheduled Meds: sodium chloride flush, 10 mL, Intravenous, BID    sodium chloride flush, 10 mL, Intravenous, 2 times per day    enoxaparin, 40 mg, Subcutaneous, Daily    acyclovir, 200 mg, Oral, BID    buPROPion, 300 mg, Oral, QAM    docusate sodium, 100 mg, Oral, BID    polyethylene glycol, 17 g, Oral, BID    metoclopramide, 10 mg, Intravenous, Q6H    pantoprazole, 40 mg, Oral, BID AC    metoprolol succinate, 12.5 mg, Oral, Daily    Physical Exam:  General: awake, alert, in no acute distress  HEENT: mucous membranes moist  Respiratory: normal effort, no wheezes appreciated  CV: appears well perfused  Abdomen: Soft, appropriately tender  Skin: warm and dry  Extremities: atraumatic, mild pitting edema below the knee, no calf pain  Neuro: no focal deficits noted  Psych: mood normal    Assessment and Plan:  [id number] [facility] y.o. female post op day 6 from laparoscopic cholecystectomy admitted with abdominal discomfort,

## 2020-08-28 NOTE — PROGRESS NOTES
[address]     Hospitalist Progress Note      Name:  Larry Fournier /Age/Sex: 1955  (60 y.o. female)   MRN & CSN:  [id number] & [id number] Admission Date/Time: 2020 11:52 PM   Location:  -A PCP: ALBERTO Landin Day: 3    Assessment and Plan:   Larry Fournier is a 60 y.o.  female  who presents with leg swelling, shortness of breath    Acute systolic CHF    Presented with peripheral edema, chest x-ray with pulmonary congestion  ECHO  reported with LVEF of 15 to 20% -no previous one for comparison  Continue with IV Lasix drip, Toprol, may add Lisinopril if BP allows   Monitor I/Os and electrolytes, reduce salt intake (I/O net since admission -3.87L)  Cardiology following and plan for [facility] on Monday   SARS-CoV-2 PCR not detected     Severe cardiomyopathy     No previous ECHO to compare -LVEF found to be depressed 15 to 20%  May need ischemia work-up -defer to cardiology  May also need LifeVest before discharge    Abdominal pain with indigestion    Started on Reglan, Protonix twice daily    Recent cholecystectomy -stable, General surgery following    Chronic conditions    Irritable bowel disease  Depression    No respiratory failure -patient was 99% on 2 L and she is off oxygen now    Transfer to 3N     Diet DIET GENERAL; Low Sodium (2 GM);  Daily Fluid Restriction: 1800 ml   DVT Prophylaxis [] Lovenox, []  Heparin, [] SCDs, []No VTE prophylaxis, patient ambulating   GI Prophylaxis [] PPI, [] H2 Blocker, [] No GI prophylaxis, patient is receiving diet/Tube Feeds   Code Status Full Code   Disposition Patient requires continued admission due to CHF   MDM [] Low, [x] Moderate,[]  High     History of Present Illness: Subjective     Patient Seen & Examined at the bedside     Patient resting with no distress while on room air  She feels relatively better today -continues to pee a lot    Denies any shortness of breath or chest pain today    Ten point ROS reviewed negative, unless as noted above    Objective: Intake/Output Summary (Last 24 hours) at 8/28/2020 1101  Last data filed at 8/28/2020 0842  Gross per 24 hour   Intake 270 ml   Output 4126 ml   Net -3856 ml      Vitals:   Vitals:    08/28/20 0858   BP:    Pulse:    Resp: 22   Temp:    SpO2: 98%     Physical Exam:    GEN Awake female, resting in bed in no apparent distress. Appears given age. HENT Mucous membranes are moist.   RESP Clear to auscultation, no wheezes, rales or rhonchi. CARDIO/VASC -S1/S2 auscultated. Regular rate without appreciable murmurs, rubs, or gallops. Peripheral pulses equal bilaterally and palpable. No peripheral edema. GI Abdomen is soft without significant tenderness, masses, or guarding. Bowel sounds are normoactive. Rectal exam deferred.  Childs catheter is not present. MSK No gross joint deformities.  Spontaneous movement of all extremities    Medications:   Medications:    sodium chloride flush  10 mL Intravenous BID    sodium chloride flush  10 mL Intravenous 2 times per day    enoxaparin  40 mg Subcutaneous Daily    acyclovir  200 mg Oral BID    buPROPion  300 mg Oral QAM    docusate sodium  100 mg Oral BID    polyethylene glycol  17 g Oral BID    metoclopramide  10 mg Intravenous Q6H    pantoprazole  40 mg Oral BID AC    metoprolol succinate  12.5 mg Oral Daily      Infusions:    furosemide (LASIX) 1mg/ml infusion 5 mg/hr (08/27/20 1852)     PRN Meds: morphine, 4 mg, Q30 Min PRN  sodium chloride flush, 10 mL, PRN  acetaminophen, 650 mg, Q6H PRN    Or  acetaminophen, 650 mg, Q6H PRN  polyethylene glycol, 17 g, Daily PRN  promethazine, 12.5 mg, Q6H PRN    Or  ondansetron, 4 mg, Q6H PRN  calcium carbonate, 500 mg, TID PRN          Electronically signed by Nadine Rosa MD on 8/28/2020 at 12:20 PM

## 2020-08-29 LAB
APTT: 40.8 SECONDS (ref 25.1–37.1)
BASOPHILS ABSOLUTE: 0.1 K/CU MM
BASOPHILS RELATIVE PERCENT: 0.8 % (ref 0–1)
D DIMER: 1485 NG/ML(DDU)
DIFFERENTIAL TYPE: ABNORMAL
EOSINOPHILS ABSOLUTE: 0.2 K/CU MM
EOSINOPHILS RELATIVE PERCENT: 2.8 % (ref 0–3)
FIBRINOGEN LEVEL: 478 MG/DL (ref 196.9–442.1)
HCT VFR BLD CALC: 38 % (ref 37–47)
HEMOGLOBIN: 11.9 GM/DL (ref 12.5–16)
IMMATURE NEUTROPHIL %: 0.1 % (ref 0–0.43)
INR BLD: 1.13 INDEX
LYMPHOCYTES ABSOLUTE: 2.2 K/CU MM
LYMPHOCYTES RELATIVE PERCENT: 31.2 % (ref 24–44)
MAGNESIUM: 1.7 MG/DL (ref 1.8–2.4)
MAGNESIUM: 2 MG/DL (ref 1.8–2.4)
MCH RBC QN AUTO: 27.5 PG (ref 27–31)
MCHC RBC AUTO-ENTMCNC: 31.3 % (ref 32–36)
MCV RBC AUTO: 87.8 FL (ref 78–100)
MONOCYTES ABSOLUTE: 0.8 K/CU MM
MONOCYTES RELATIVE PERCENT: 11.6 % (ref 0–4)
NUCLEATED RBC %: 0 %
PDW BLD-RTO: 13.4 % (ref 11.7–14.9)
PLATELET # BLD: 269 K/CU MM (ref 140–440)
PMV BLD AUTO: 9.5 FL (ref 7.5–11.1)
PROTHROMBIN TIME: 13.7 SECONDS (ref 11.7–14.5)
RBC # BLD: 4.33 M/CU MM (ref 4.2–5.4)
SEGMENTED NEUTROPHILS ABSOLUTE COUNT: 3.8 K/CU MM
SEGMENTED NEUTROPHILS RELATIVE PERCENT: 53.5 % (ref 36–66)
TOTAL IMMATURE NEUTOROPHIL: 0.01 K/CU MM
TOTAL NUCLEATED RBC: 0 K/CU MM
WBC # BLD: 7.1 K/CU MM (ref 4–10.5)

## 2020-08-29 PROCEDURE — 1200000000 HC SEMI PRIVATE

## 2020-08-29 PROCEDURE — 6370000000 HC RX 637 (ALT 250 FOR IP): Performed by: INTERNAL MEDICINE

## 2020-08-29 PROCEDURE — 85730 THROMBOPLASTIN TIME PARTIAL: CPT

## 2020-08-29 PROCEDURE — 6360000002 HC RX W HCPCS: Performed by: INTERNAL MEDICINE

## 2020-08-29 PROCEDURE — 2580000003 HC RX 258: Performed by: INTERNAL MEDICINE

## 2020-08-29 PROCEDURE — 85379 FIBRIN DEGRADATION QUANT: CPT

## 2020-08-29 PROCEDURE — 6370000000 HC RX 637 (ALT 250 FOR IP): Performed by: NURSE PRACTITIONER

## 2020-08-29 PROCEDURE — 36415 COLL VENOUS BLD VENIPUNCTURE: CPT

## 2020-08-29 PROCEDURE — 99232 SBSQ HOSP IP/OBS MODERATE 35: CPT | Performed by: INTERNAL MEDICINE

## 2020-08-29 PROCEDURE — 85384 FIBRINOGEN ACTIVITY: CPT

## 2020-08-29 PROCEDURE — 83735 ASSAY OF MAGNESIUM: CPT

## 2020-08-29 PROCEDURE — APPSS60 APP SPLIT SHARED TIME 46-60 MINUTES: Performed by: NURSE PRACTITIONER

## 2020-08-29 PROCEDURE — 85610 PROTHROMBIN TIME: CPT

## 2020-08-29 PROCEDURE — 85025 COMPLETE CBC W/AUTO DIFF WBC: CPT

## 2020-08-29 RX ORDER — METOPROLOL SUCCINATE 25 MG/1
25 TABLET, EXTENDED RELEASE ORAL DAILY
Status: DISCONTINUED | OUTPATIENT
Start: 2020-08-29 | End: 2020-09-01 | Stop reason: HOSPADM

## 2020-08-29 RX ORDER — FUROSEMIDE 40 MG/1
40 TABLET ORAL DAILY
Status: DISCONTINUED | OUTPATIENT
Start: 2020-08-29 | End: 2020-09-01 | Stop reason: HOSPADM

## 2020-08-29 RX ORDER — MAGNESIUM SULFATE 1 G/100ML
1 INJECTION INTRAVENOUS PRN
Status: DISCONTINUED | OUTPATIENT
Start: 2020-08-29 | End: 2020-09-01 | Stop reason: HOSPADM

## 2020-08-29 RX ADMIN — METOCLOPRAMIDE 10 MG: 5 INJECTION, SOLUTION INTRAMUSCULAR; INTRAVENOUS at 03:04

## 2020-08-29 RX ADMIN — METOPROLOL SUCCINATE 25 MG: 25 TABLET, EXTENDED RELEASE ORAL at 08:35

## 2020-08-29 RX ADMIN — SACUBITRIL AND VALSARTAN 1 TABLET: 24; 26 TABLET, FILM COATED ORAL at 21:10

## 2020-08-29 RX ADMIN — ACYCLOVIR 200 MG: 200 CAPSULE ORAL at 21:10

## 2020-08-29 RX ADMIN — SACUBITRIL AND VALSARTAN 1 TABLET: 24; 26 TABLET, FILM COATED ORAL at 10:04

## 2020-08-29 RX ADMIN — PANTOPRAZOLE SODIUM 40 MG: 40 TABLET, DELAYED RELEASE ORAL at 06:31

## 2020-08-29 RX ADMIN — FUROSEMIDE 5 MG/HR: 10 INJECTION, SOLUTION INTRAMUSCULAR; INTRAVENOUS at 06:31

## 2020-08-29 RX ADMIN — SODIUM CHLORIDE, PRESERVATIVE FREE 10 ML: 5 INJECTION INTRAVENOUS at 21:10

## 2020-08-29 RX ADMIN — DOCUSATE SODIUM 100 MG: 100 CAPSULE, LIQUID FILLED ORAL at 08:35

## 2020-08-29 RX ADMIN — ENOXAPARIN SODIUM 40 MG: 40 INJECTION SUBCUTANEOUS at 08:36

## 2020-08-29 RX ADMIN — SODIUM CHLORIDE, PRESERVATIVE FREE 10 ML: 5 INJECTION INTRAVENOUS at 08:45

## 2020-08-29 RX ADMIN — METOCLOPRAMIDE 10 MG: 5 INJECTION, SOLUTION INTRAMUSCULAR; INTRAVENOUS at 08:36

## 2020-08-29 RX ADMIN — PANTOPRAZOLE SODIUM 40 MG: 40 TABLET, DELAYED RELEASE ORAL at 17:12

## 2020-08-29 RX ADMIN — ACYCLOVIR 200 MG: 200 CAPSULE ORAL at 08:35

## 2020-08-29 RX ADMIN — BUPROPION HYDROCHLORIDE 300 MG: 150 TABLET, EXTENDED RELEASE ORAL at 08:35

## 2020-08-29 RX ADMIN — SODIUM CHLORIDE, PRESERVATIVE FREE 10 ML: 5 INJECTION INTRAVENOUS at 03:04

## 2020-08-29 ASSESSMENT — PAIN SCALES - GENERAL
PAINLEVEL_OUTOF10: 0

## 2020-08-29 NOTE — PROGRESS NOTES
5820  Gross per 24 hour   Intake --   Output 1645 ml   Net -1645 ml      Vitals:   Vitals:    08/29/20 0815   BP: 130/65   Pulse: 92   Resp: 14   Temp: 97.4 °F (36.3 °C)   SpO2:      Physical Exam:    GEN Awake female, resting in bed in no apparent distress. Appears given age. HENT Mucous membranes are moist.   RESP Clear to auscultation, no wheezes, rales or rhonchi. CARDIO/VASC -S1/S2 auscultated. Regular rate without appreciable murmurs, rubs, or gallops. Peripheral pulses equal bilaterally and palpable. No peripheral edema. GI Abdomen is soft without significant tenderness, masses, or guarding. Bowel sounds are normoactive. Rectal exam deferred.  Childs catheter is not present. MSK No gross joint deformities.  Spontaneous movement of all extremities    Medications:   Medications:    metoprolol succinate  25 mg Oral Daily    sacubitril-valsartan  1 tablet Oral BID    sodium chloride flush  10 mL Intravenous BID    sodium chloride flush  10 mL Intravenous 2 times per day    enoxaparin  40 mg Subcutaneous Daily    acyclovir  200 mg Oral BID    buPROPion  300 mg Oral QAM    docusate sodium  100 mg Oral BID    polyethylene glycol  17 g Oral BID    metoclopramide  10 mg Intravenous Q6H    pantoprazole  40 mg Oral BID AC      Infusions:    furosemide (LASIX) 1mg/ml infusion 5 mg/hr (08/29/20 0631)     PRN Meds: magnesium sulfate, 1 g, PRN  morphine, 4 mg, Q30 Min PRN  sodium chloride flush, 10 mL, PRN  acetaminophen, 650 mg, Q6H PRN    Or  acetaminophen, 650 mg, Q6H PRN  polyethylene glycol, 17 g, Daily PRN  promethazine, 12.5 mg, Q6H PRN    Or  ondansetron, 4 mg, Q6H PRN  calcium carbonate, 500 mg, TID PRN          Electronically signed by Niko Brantley MD on 8/29/2020 at 12:20 PM

## 2020-08-29 NOTE — PROGRESS NOTES
[facility] [place]  [address], [address],[address]  Phone: [phone]    Fax [phone]                  Cristina Estrada MD, Dalila Leahy MD, Angelique Trujillo MD, MD Lisa Burch MD Phoebe Golds, MD Tammie Saddler, APRN      Bulmaro Salazar, APRN  Lourdes Holguin, APRN    Jodie Arreaga, APRN    Cardiology Progress Note     Today's Plan:  [facility] on monday    Admit Date:  8/26/2020    Consult reason/ Seen today for: CHF    Subjective and  Overnight Events: Patient states that she is feeling better today. She states that she can lay flat and her ankles appear to be normal size again. Reviewed with patient low-sodium diet and fluid restriction of 2000 mL's per day. Assessment / Plan / Recommendation:     1. New onset of acute decompensated heart failure: Ejection fraction 15 to 20%. Etiology uncertain, left heart cath planned for Monday. Patient appears to be more compensated. Will stop Lasix infusion this evening and change patient to p.o. Lasix to start tomorrow. Will increase Toprol to 25 mg daily, as heart rate remains 80s to 90s, and start patient on low-dose Entresto for afterload reduction. We will plan for initiation of Aldactone prior to discharge. 2 g sodium diet. 2 L fluid restriction. Strict intake output and daily weights  2. Plan for left heart cath on Monday. Patient is agreeable to plan. 3. Recent cholecystectomy: Stable. per primary  4. DVT prophylaxis if not contraindicated while in the hospital.       History of Presenting Illness:    Chief complain on admission : 60 y. o.year old who is admitted for  Chief Complaint   Patient presents with    Abdominal Pain     gallbladder removed saturday morning    Shortness of Breath        Past medical history:    has a past medical history of H/O arthritis and History of IBS.   Past surgical history:   has a past surgical history that includes Colonoscopy (9-); Hysterectomy; and Cholecystectomy, laparoscopic (N/A, 8/22/2020). Social History:   reports that she has never smoked. She has never used smokeless tobacco. She reports current alcohol use. She reports current drug use. Drug: Marijuana. Family history:  family history is not on file. Allergies   Allergen Reactions    Keflex [Cephalexin]        Review of Systems  Review of Systems:    All 14 systems were reviewed and are negative  Except for the positive findings  which as documented     /65   Pulse 92   Temp 97.4 °F (36.3 °C) (Oral)   Resp 14   Ht 5' 8\" (1.727 m)   Wt 221 lb 1.9 oz (100.3 kg)   SpO2 95%   BMI 33.62 kg/m²       Intake/Output Summary (Last 24 hours) at 8/29/2020 0827  Last data filed at 8/29/2020 2974  Gross per 24 hour   Intake 20 ml   Output 1645 ml   Net -1625 ml       Physical Exam  Vitals signs reviewed. Constitutional:       General: She is not in acute distress. Appearance: Normal appearance. She is not ill-appearing. Eyes:      Pupils: Pupils are equal, round, and reactive to light. Neck:      Musculoskeletal: Neck supple. No muscular tenderness. Vascular: No carotid bruit. Cardiovascular:      Rate and Rhythm: Normal rate and regular rhythm. Pulses: Normal pulses. Heart sounds: Murmur present. Pulmonary:      Effort: Pulmonary effort is normal. No respiratory distress. Breath sounds: Decreased breath sounds present. Musculoskeletal:         General: No swelling or deformity. Skin:     General: Skin is warm and dry. Capillary Refill: Capillary refill takes less than 2 seconds. Comments: Bilateral vascular discoloration to lower extremities   Neurological:      Mental Status: She is alert and oriented to person, place, and time. Psychiatric:         Mood and Affect: Mood normal.         Behavior: Behavior normal.         Thought Content:  Thought content normal.         Judgment: Judgment normal.         Telemetry Reviewed: Sinus rhythm    Medications:    sodium chloride flush  10 mL Intravenous BID    sodium chloride flush  10 mL Intravenous 2 times per day    enoxaparin  40 mg Subcutaneous Daily    acyclovir  200 mg Oral BID    buPROPion  300 mg Oral QAM    docusate sodium  100 mg Oral BID    polyethylene glycol  17 g Oral BID    metoclopramide  10 mg Intravenous Q6H    pantoprazole  40 mg Oral BID AC    metoprolol succinate  12.5 mg Oral Daily      furosemide (LASIX) 1mg/ml infusion 5 mg/hr (08/29/20 0631)     magnesium sulfate, morphine, sodium chloride flush, acetaminophen **OR** acetaminophen, polyethylene glycol, promethazine **OR** ondansetron, calcium carbonate    Lab Data:  CBC:   Recent Labs     08/27/20  0021 08/28/20  0530 08/29/20  0026   WBC 7.1 6.6 7.1   HGB 11.4* 11.9* 11.9*   HCT 36.0* 38.2 38.0   MCV 89.1 88.6 87.8    237 269     BMP:   Recent Labs     08/27/20  0021 08/27/20  1623 08/28/20  0530     --  140   K 3.5 3.6 3.4*     --  95*   CO2 24  --  30   BUN 9  --  7   CREATININE 0.7  --  0.8     PT/INR:   Recent Labs     08/27/20  1622 08/28/20  0530 08/29/20  0026   PROTIME 14.4 14.0 13.7   INR 1.19 1.16 1.13     BNP:    Recent Labs     08/27/20  1622   PROBNP 5,724*     TROPONIN:   Recent Labs     08/27/20  1622   TROPONINT <0.010        ECHO :   Echocardiogram 8/27/2020 Summary   Left ventricular systolic function is abnormal.   Ejection fraction is visually estimated at 15-20%. Mild to moderate mitral regurgitation. Mild tricuspid regurgitation; RVSP: 53 mmHg consistent with moderate PHTN. No evidence of any pericardial effusion. All labs, medications and tests reviewed by myself , continue all other medications of all above medical condition listed as is except for changes mentioned above. Thank you very much for consult , please call with questions.     Electronically signed by ALBERTO Mina CNP on 8/29/2020 at 8:27 AM      I have seen ,spoken to  and examined this patient personally, independently of the nurse practitioner. I have reviewed the hospital care given to date and reviewed all pertinent labs and imaging. The plan was developed mutually at the time of the visit with the patient,  NP  and myself. I have spoken with patient, nursing staff and provided written and verbal instructions . The above note has been reviewed and I agree with the assessment, diagnosis, and treatment plan with changes made by me as follows     CARDIOLOGY ATTENDING ADDENDUM    HPI:  I have reviewed the above HPI  And agree with above   Donavon Emanuel is a 60 y. o.year old who and presents with had concerns including Abdominal Pain (gallbladder removed saturday morning) and Shortness of Breath.   Chief Complaint   Patient presents with    Abdominal Pain     gallbladder removed saturday morning    Shortness of Breath     Interval history:  Mild sob    Physical Exam:  General:  Awake, alert, NAD  Head:normal  Eye:normal  Neck:  No JVD   Chest:  Clear to auscultation, respiration easy  Cardiovascular:  RRR S1S2  Abdomen:   nontender  Extremities:  tr edema  Pulses; palpable  Neuro: grossly normal      MEDICAL DECISION MAKING;    I agree with the above plan, which was planned by myself and discussed with NP.  [facility] on Monday  On bb , barb Ortega MD [facility]

## 2020-08-30 LAB
ANION GAP SERPL CALCULATED.3IONS-SCNC: 12 MMOL/L (ref 4–16)
BUN BLDV-MCNC: 14 MG/DL (ref 6–23)
CALCIUM SERPL-MCNC: 9.2 MG/DL (ref 8.3–10.6)
CHLORIDE BLD-SCNC: 93 MMOL/L (ref 99–110)
CO2: 31 MMOL/L (ref 21–32)
CREAT SERPL-MCNC: 0.9 MG/DL (ref 0.6–1.1)
GFR AFRICAN AMERICAN: >60 ML/MIN/1.73M2
GFR NON-AFRICAN AMERICAN: >60 ML/MIN/1.73M2
GLUCOSE BLD-MCNC: 101 MG/DL (ref 70–99)
MAGNESIUM: 2 MG/DL (ref 1.8–2.4)
POTASSIUM SERPL-SCNC: 3.2 MMOL/L (ref 3.5–5.1)
SODIUM BLD-SCNC: 136 MMOL/L (ref 135–145)

## 2020-08-30 PROCEDURE — 6360000002 HC RX W HCPCS: Performed by: NURSE PRACTITIONER

## 2020-08-30 PROCEDURE — 94761 N-INVAS EAR/PLS OXIMETRY MLT: CPT

## 2020-08-30 PROCEDURE — 6370000000 HC RX 637 (ALT 250 FOR IP): Performed by: NURSE PRACTITIONER

## 2020-08-30 PROCEDURE — 6360000002 HC RX W HCPCS: Performed by: INTERNAL MEDICINE

## 2020-08-30 PROCEDURE — 80048 BASIC METABOLIC PNL TOTAL CA: CPT

## 2020-08-30 PROCEDURE — 6370000000 HC RX 637 (ALT 250 FOR IP): Performed by: INTERNAL MEDICINE

## 2020-08-30 PROCEDURE — APPSS45 APP SPLIT SHARED TIME 31-45 MINUTES: Performed by: NURSE PRACTITIONER

## 2020-08-30 PROCEDURE — 2580000003 HC RX 258: Performed by: INTERNAL MEDICINE

## 2020-08-30 PROCEDURE — 99232 SBSQ HOSP IP/OBS MODERATE 35: CPT | Performed by: INTERNAL MEDICINE

## 2020-08-30 PROCEDURE — 1200000000 HC SEMI PRIVATE

## 2020-08-30 PROCEDURE — 83735 ASSAY OF MAGNESIUM: CPT

## 2020-08-30 PROCEDURE — 36415 COLL VENOUS BLD VENIPUNCTURE: CPT

## 2020-08-30 RX ORDER — POTASSIUM CHLORIDE 20 MEQ/1
20 TABLET, EXTENDED RELEASE ORAL 2 TIMES DAILY
Status: DISCONTINUED | OUTPATIENT
Start: 2020-08-30 | End: 2020-09-01 | Stop reason: HOSPADM

## 2020-08-30 RX ORDER — MAGNESIUM OXIDE 400 MG/1
400 TABLET ORAL NIGHTLY
Status: DISCONTINUED | OUTPATIENT
Start: 2020-08-30 | End: 2020-09-01 | Stop reason: HOSPADM

## 2020-08-30 RX ORDER — POTASSIUM CHLORIDE 7.45 MG/ML
10 INJECTION INTRAVENOUS
Status: DISCONTINUED | OUTPATIENT
Start: 2020-08-30 | End: 2020-08-30

## 2020-08-30 RX ORDER — POTASSIUM CHLORIDE 20 MEQ/1
40 TABLET, EXTENDED RELEASE ORAL ONCE
Status: COMPLETED | OUTPATIENT
Start: 2020-08-30 | End: 2020-08-30

## 2020-08-30 RX ADMIN — PANTOPRAZOLE SODIUM 40 MG: 40 TABLET, DELAYED RELEASE ORAL at 05:43

## 2020-08-30 RX ADMIN — ENOXAPARIN SODIUM 40 MG: 40 INJECTION SUBCUTANEOUS at 08:21

## 2020-08-30 RX ADMIN — SODIUM CHLORIDE, PRESERVATIVE FREE 10 ML: 5 INJECTION INTRAVENOUS at 20:57

## 2020-08-30 RX ADMIN — POTASSIUM CHLORIDE 20 MEQ: 1500 TABLET, EXTENDED RELEASE ORAL at 20:56

## 2020-08-30 RX ADMIN — ACYCLOVIR 200 MG: 200 CAPSULE ORAL at 08:20

## 2020-08-30 RX ADMIN — METOPROLOL SUCCINATE 25 MG: 25 TABLET, EXTENDED RELEASE ORAL at 08:20

## 2020-08-30 RX ADMIN — POTASSIUM CHLORIDE 10 MEQ: 7.46 INJECTION, SOLUTION INTRAVENOUS at 08:51

## 2020-08-30 RX ADMIN — POTASSIUM CHLORIDE 40 MEQ: 1500 TABLET, EXTENDED RELEASE ORAL at 10:40

## 2020-08-30 RX ADMIN — SODIUM CHLORIDE, PRESERVATIVE FREE 10 ML: 5 INJECTION INTRAVENOUS at 08:22

## 2020-08-30 RX ADMIN — POTASSIUM CHLORIDE 20 MEQ: 1500 TABLET, EXTENDED RELEASE ORAL at 08:25

## 2020-08-30 RX ADMIN — SACUBITRIL AND VALSARTAN 1 TABLET: 24; 26 TABLET, FILM COATED ORAL at 08:20

## 2020-08-30 RX ADMIN — BUPROPION HYDROCHLORIDE 300 MG: 150 TABLET, EXTENDED RELEASE ORAL at 08:20

## 2020-08-30 RX ADMIN — ACYCLOVIR 200 MG: 200 CAPSULE ORAL at 20:56

## 2020-08-30 RX ADMIN — SACUBITRIL AND VALSARTAN 1 TABLET: 24; 26 TABLET, FILM COATED ORAL at 20:56

## 2020-08-30 RX ADMIN — MAGNESIUM OXIDE 400 MG: 400 TABLET ORAL at 20:56

## 2020-08-30 RX ADMIN — FUROSEMIDE 40 MG: 40 TABLET ORAL at 08:20

## 2020-08-30 RX ADMIN — PANTOPRAZOLE SODIUM 40 MG: 40 TABLET, DELAYED RELEASE ORAL at 16:26

## 2020-08-30 ASSESSMENT — PAIN SCALES - GENERAL
PAINLEVEL_OUTOF10: 0
PAINLEVEL_OUTOF10: 0

## 2020-08-30 NOTE — PROGRESS NOTES
that she has never smoked. She has never used smokeless tobacco. She reports current alcohol use. She reports current drug use. Drug: Marijuana. Family history:  family history is not on file. Allergies   Allergen Reactions    Keflex [Cephalexin]        Review of Systems  Review of Systems:    All 14 systems were reviewed and are negative  Except for the positive findings  which as documented     BP (!) 120/54   Pulse 77   Temp 98.4 °F (36.9 °C) (Oral)   Resp 18   Ht 5' 8\" (1.727 m)   Wt 221 lb 1.9 oz (100.3 kg)   SpO2 97%   BMI 33.62 kg/m²       Intake/Output Summary (Last 24 hours) at 8/30/2020 0706  Last data filed at 8/30/2020 0656  Gross per 24 hour   Intake --   Output 1600 ml   Net -1600 ml       Physical Exam  Vitals signs reviewed. Constitutional:       General: She is not in acute distress. Appearance: Normal appearance. She is not ill-appearing. Eyes:      Pupils: Pupils are equal, round, and reactive to light. Neck:      Musculoskeletal: Neck supple. No muscular tenderness. Vascular: No carotid bruit. Cardiovascular:      Rate and Rhythm: Normal rate and regular rhythm. Pulses: Normal pulses. Heart sounds: Murmur present. Pulmonary:      Effort: Pulmonary effort is normal. No respiratory distress. Breath sounds: Normal breath sounds. Musculoskeletal:         General: No swelling or deformity. Skin:     General: Skin is warm and dry. Capillary Refill: Capillary refill takes less than 2 seconds. Comments: Bilateral vascular discoloration to lower extremities   Neurological:      Mental Status: She is alert and oriented to person, place, and time. Psychiatric:         Mood and Affect: Mood normal.         Behavior: Behavior normal.         Thought Content:  Thought content normal.         Judgment: Judgment normal.         Telemetry Reviewed:   Sinus rhythm    Medications:    potassium chloride  10 mEq Intravenous Q1H    potassium chloride  20 mEq Oral BID    metoprolol succinate  25 mg Oral Daily    sacubitril-valsartan  1 tablet Oral BID    furosemide  40 mg Oral Daily    sodium chloride flush  10 mL Intravenous BID    sodium chloride flush  10 mL Intravenous 2 times per day    enoxaparin  40 mg Subcutaneous Daily    acyclovir  200 mg Oral BID    buPROPion  300 mg Oral QAM    docusate sodium  100 mg Oral BID    polyethylene glycol  17 g Oral BID    pantoprazole  40 mg Oral BID AC       magnesium sulfate, morphine, sodium chloride flush, acetaminophen **OR** acetaminophen, polyethylene glycol, promethazine **OR** ondansetron, calcium carbonate    Lab Data:  CBC:   Recent Labs     08/28/20  0530 08/29/20  0026   WBC 6.6 7.1   HGB 11.9* 11.9*   HCT 38.2 38.0   MCV 88.6 87.8    269     BMP:   Recent Labs     08/27/20  1623 08/28/20  0530 08/30/20  0258   NA  --  140 136   K 3.6 3.4* 3.2*   CL  --  95* 93*   CO2  --  30 31   BUN  --  7 14   CREATININE  --  0.8 0.9     PT/INR:   Recent Labs     08/27/20  1622 08/28/20  0530 08/29/20  0026   PROTIME 14.4 14.0 13.7   INR 1.19 1.16 1.13     BNP:    Recent Labs     08/27/20  1622   PROBNP 5,724*     TROPONIN:   Recent Labs     08/27/20  1622   TROPONINT <0.010        ECHO :   Echocardiogram 8/27/2020 Summary   Left ventricular systolic function is abnormal.   Ejection fraction is visually estimated at 15-20%. Mild to moderate mitral regurgitation. Mild tricuspid regurgitation; RVSP: 53 mmHg consistent with moderate PHTN. No evidence of any pericardial effusion. All labs, medications and tests reviewed by myself , continue all other medications of all above medical condition listed as is except for changes mentioned above. Thank you very much for consult , please call with questions. Electronically signed by ALBERTO Marquez CNP on 8/30/2020 at 7:43 AM        I have seen ,spoken to  and examined this patient personally, independently of the nurse practitioner.  I have reviewed the hospital care given to date and reviewed all pertinent labs and imaging. The plan was developed mutually at the time of the visit with the patient,  NP  and myself. I have spoken with patient, nursing staff and provided written and verbal instructions . The above note has been reviewed and I agree with the assessment, diagnosis, and treatment plan with changes made by me as follows     CARDIOLOGY ATTENDING ADDENDUM    HPI:  I have reviewed the above HPI  And agree with above   Marino Alvarez is a 60 y. o.year old who and presents with had concerns including Abdominal Pain (gallbladder removed saturday morning) and Shortness of Breath.   Chief Complaint   Patient presents with    Abdominal Pain     gallbladder removed saturday morning    Shortness of Breath     Interval history:  Feel better    Physical Exam:  General:  Awake, alert, NAD  Head:normal  Eye:normal  Neck:  No JVD   Chest:  Clear to auscultation, respiration easy  Cardiovascular:  RRR S1S2  Abdomen:   nontender  Extremities:  tr edema  Pulses; palpable  Neuro: grossly normal      MEDICAL DECISION MAKING;    I agree with the above plan, which was planned by myself and discussed with NP.  [facility] in am  To r/o CAD  HFreF on meds          So Putnam MD [facility]

## 2020-08-31 LAB
ACTIVATED CLOTTING TIME, LOW RANGE: 192 SEC
ANION GAP SERPL CALCULATED.3IONS-SCNC: 10 MMOL/L (ref 4–16)
BUN BLDV-MCNC: 21 MG/DL (ref 6–23)
CALCIUM SERPL-MCNC: 9.3 MG/DL (ref 8.3–10.6)
CHLORIDE BLD-SCNC: 98 MMOL/L (ref 99–110)
CO2: 27 MMOL/L (ref 21–32)
CREAT SERPL-MCNC: 1 MG/DL (ref 0.6–1.1)
GFR AFRICAN AMERICAN: >60 ML/MIN/1.73M2
GFR NON-AFRICAN AMERICAN: 56 ML/MIN/1.73M2
GLUCOSE BLD-MCNC: 112 MG/DL (ref 70–99)
LV EF: 18 %
LVEF MODALITY: NORMAL
MAGNESIUM: 2 MG/DL (ref 1.8–2.4)
POTASSIUM SERPL-SCNC: 3.9 MMOL/L (ref 3.5–5.1)
SODIUM BLD-SCNC: 135 MMOL/L (ref 135–145)

## 2020-08-31 PROCEDURE — 93458 L HRT ARTERY/VENTRICLE ANGIO: CPT | Performed by: INTERNAL MEDICINE

## 2020-08-31 PROCEDURE — 6370000000 HC RX 637 (ALT 250 FOR IP): Performed by: INTERNAL MEDICINE

## 2020-08-31 PROCEDURE — B2151ZZ FLUOROSCOPY OF LEFT HEART USING LOW OSMOLAR CONTRAST: ICD-10-PCS | Performed by: INTERNAL MEDICINE

## 2020-08-31 PROCEDURE — 6360000002 HC RX W HCPCS

## 2020-08-31 PROCEDURE — 99024 POSTOP FOLLOW-UP VISIT: CPT | Performed by: SURGERY

## 2020-08-31 PROCEDURE — 6360000004 HC RX CONTRAST MEDICATION

## 2020-08-31 PROCEDURE — 83735 ASSAY OF MAGNESIUM: CPT

## 2020-08-31 PROCEDURE — 2500000003 HC RX 250 WO HCPCS

## 2020-08-31 PROCEDURE — 93458 L HRT ARTERY/VENTRICLE ANGIO: CPT

## 2020-08-31 PROCEDURE — 94761 N-INVAS EAR/PLS OXIMETRY MLT: CPT

## 2020-08-31 PROCEDURE — 6370000000 HC RX 637 (ALT 250 FOR IP): Performed by: NURSE PRACTITIONER

## 2020-08-31 PROCEDURE — 36415 COLL VENOUS BLD VENIPUNCTURE: CPT

## 2020-08-31 PROCEDURE — 2709999900 HC NON-CHARGEABLE SUPPLY

## 2020-08-31 PROCEDURE — 80048 BASIC METABOLIC PNL TOTAL CA: CPT

## 2020-08-31 PROCEDURE — 85347 COAGULATION TIME ACTIVATED: CPT

## 2020-08-31 PROCEDURE — 4A023N7 MEASUREMENT OF CARDIAC SAMPLING AND PRESSURE, LEFT HEART, PERCUTANEOUS APPROACH: ICD-10-PCS | Performed by: INTERNAL MEDICINE

## 2020-08-31 PROCEDURE — B2111ZZ FLUOROSCOPY OF MULTIPLE CORONARY ARTERIES USING LOW OSMOLAR CONTRAST: ICD-10-PCS | Performed by: INTERNAL MEDICINE

## 2020-08-31 PROCEDURE — C1769 GUIDE WIRE: HCPCS

## 2020-08-31 PROCEDURE — 6360000002 HC RX W HCPCS: Performed by: INTERNAL MEDICINE

## 2020-08-31 PROCEDURE — C1894 INTRO/SHEATH, NON-LASER: HCPCS

## 2020-08-31 PROCEDURE — 2580000003 HC RX 258: Performed by: INTERNAL MEDICINE

## 2020-08-31 PROCEDURE — APPNB15 APP NON BILLABLE TIME 0-15 MINS: Performed by: NURSE PRACTITIONER

## 2020-08-31 PROCEDURE — 1200000000 HC SEMI PRIVATE

## 2020-08-31 PROCEDURE — C1887 CATHETER, GUIDING: HCPCS

## 2020-08-31 RX ADMIN — POTASSIUM CHLORIDE 20 MEQ: 1500 TABLET, EXTENDED RELEASE ORAL at 19:47

## 2020-08-31 RX ADMIN — SACUBITRIL AND VALSARTAN 1 TABLET: 24; 26 TABLET, FILM COATED ORAL at 19:47

## 2020-08-31 RX ADMIN — PANTOPRAZOLE SODIUM 40 MG: 40 TABLET, DELAYED RELEASE ORAL at 14:31

## 2020-08-31 RX ADMIN — MAGNESIUM OXIDE 400 MG: 400 TABLET ORAL at 19:46

## 2020-08-31 RX ADMIN — POTASSIUM CHLORIDE 20 MEQ: 1500 TABLET, EXTENDED RELEASE ORAL at 14:31

## 2020-08-31 RX ADMIN — SACUBITRIL AND VALSARTAN 1 TABLET: 24; 26 TABLET, FILM COATED ORAL at 14:30

## 2020-08-31 RX ADMIN — METOPROLOL SUCCINATE 25 MG: 25 TABLET, EXTENDED RELEASE ORAL at 14:31

## 2020-08-31 RX ADMIN — ENOXAPARIN SODIUM 40 MG: 40 INJECTION SUBCUTANEOUS at 14:31

## 2020-08-31 RX ADMIN — SODIUM CHLORIDE, PRESERVATIVE FREE 10 ML: 5 INJECTION INTRAVENOUS at 19:49

## 2020-08-31 RX ADMIN — BUPROPION HYDROCHLORIDE 300 MG: 150 TABLET, EXTENDED RELEASE ORAL at 14:31

## 2020-08-31 RX ADMIN — ACYCLOVIR 200 MG: 200 CAPSULE ORAL at 19:48

## 2020-08-31 RX ADMIN — ACYCLOVIR 200 MG: 200 CAPSULE ORAL at 14:30

## 2020-08-31 RX ADMIN — FUROSEMIDE 40 MG: 40 TABLET ORAL at 14:31

## 2020-08-31 ASSESSMENT — PAIN SCALES - GENERAL
PAINLEVEL_OUTOF10: 0
PAINLEVEL_OUTOF10: 0

## 2020-08-31 NOTE — FLOWSHEET NOTE
Pt transported to 3004 in bed/on monitor.  Right radial site free of bleeding/hematoma at time of transfer

## 2020-08-31 NOTE — PROGRESS NOTES
[address]    Hospitalist Progress Note      Name:  Tereza Poon /Age/Sex: 1955  (60 y.o. female)   MRN & CSN:  [id number] & [id number] Admission Date/Time: 2020 11:52 PM   Location:  Cath Lab/NONE PCP: ALBERTO Monk Day: 6    Assessment and Plan:   Tereza Poon is a 60 y.o.  female  who presents with leg swelling, shortness of breath    Acute systolic CHF - improving     Presented with peripheral edema, chest x-ray with pulmonary congestion  ECHO  reported with LVEF of 15 to 20% -no previous one for comparison  Toprol, Entresto per Cardiology   Monitor I/Os and electrolytes, reduce salt intake (I/O net since admission -7.0L)  SARS-CoV-2 PCR not detected   S/p LHC normal coronaries - widely patent   Continue with PO lasix and adjust CHF meds     Hypokalemia - due to diuresis -resolved     Non ischemic cardiomyopathy     No previous ECHO to compare -LVEF found to be depressed 15 to 20%  May also need LifeVest before discharge - defer to cardiology     Abdominal pain with indigestion - resolving     DC Reglan, continue with Protonix    Recent cholecystectomy -stable, General surgery following    Chronic conditions    Irritable bowel disease  Depression    May Dc when stable per Cardiology     Diet DIET GENERAL; Low Sodium (2 GM);  Daily Fluid Restriction: 1800 ml   DVT Prophylaxis [] Lovenox, []  Heparin, [] SCDs, []No VTE prophylaxis, patient ambulating   GI Prophylaxis [] PPI, [] H2 Blocker, [] No GI prophylaxis, patient is receiving diet/Tube Feeds   Code Status Full Code   Disposition Patient requires continued admission due to CHF   MDM [] Low, [x] Moderate,[]  High     History of Present Illness: Subjective     Patient Seen & Examined at the bedside     Patient resting with no distress while on room air -seen before her cardiac cath  Feeling much better and hopeful about good results from the cardiac cath    Ten point ROS reviewed negative, unless as noted

## 2020-08-31 NOTE — CARE COORDINATION
Patient screened for discharge needs with no needs identified at this time. Pt is from home, has PCP, insurance with RX coverage and was independent prior to admission. However, CM available if needs arise.

## 2020-08-31 NOTE — PROGRESS NOTES
GENERAL SURGERY PROGRESS NOTE    Gisele Steve is a 60 y.o. female s/p laparoscopic cholecystectomy for gallstone pancreatitis on 8/22. Subjective:  Doing ok today. Denies acute events over the weekend. No abdominal pain. Has been tolerating a diet. NPO currently for cath today. Objective:    Vitals: VITALS:  BP (!) 100/57   Pulse 82   Temp 97.8 °F (36.6 °C) (Oral)   Resp 18   Ht 5' 8\" (1.727 m)   Wt 222 lb (100.7 kg)   SpO2 100%   BMI 33.75 kg/m²     I/O: 08/30 0701 - 08/31 0700  In: 10 [I.V.:10]  Out: 1 [Urine:1]    Labs/Imaging Results:   Lab Results   Component Value Date     08/31/2020    K 3.9 08/31/2020    CL 98 08/31/2020    CO2 27 08/31/2020    BUN 21 08/31/2020    CREATININE 1.0 08/31/2020    GLUCOSE 112 08/31/2020    CALCIUM 9.3 08/31/2020      Lab Results   Component Value Date    WBC 7.1 08/29/2020    HGB 11.9 (L) 08/29/2020    HCT 38.0 08/29/2020    MCV 87.8 08/29/2020     08/29/2020         Scheduled Meds:   potassium chloride, 20 mEq, Oral, BID    magnesium oxide, 400 mg, Oral, Nightly    metoprolol succinate, 25 mg, Oral, Daily    sacubitril-valsartan, 1 tablet, Oral, BID    furosemide, 40 mg, Oral, Daily    sodium chloride flush, 10 mL, Intravenous, BID    sodium chloride flush, 10 mL, Intravenous, 2 times per day    enoxaparin, 40 mg, Subcutaneous, Daily    acyclovir, 200 mg, Oral, BID    buPROPion, 300 mg, Oral, QAM    docusate sodium, 100 mg, Oral, BID    polyethylene glycol, 17 g, Oral, BID    pantoprazole, 40 mg, Oral, BID AC    Physical Exam:  General: awake, alert, in no acute distress  HEENT: mucous membranes moist  Respiratory: normal effort, no wheezes appreciated  CV: appears well perfused  Abdomen: Soft, non-tender, port sites clean and dry, moderate ecchymosis around port sites and lovenox injection sites.   Skin: warm and dry  Extremities: atraumatic, edema improved  Neuro: no focal deficits noted  Psych: mood normal    Assessment and Plan:  60 y.o. female post op day 9 from laparoscopic cholecystectomy admitted with abdominal discomfort, nausea, shortness of breath and edema. Found to have CHF exacerbation with EF 15-20%     Doing well from her gallbladder surgery.  Plan for cardiac cath today.     Patient Active Problem List   Diagnosis    Acute pancreatitis due to calculus of common bile duct    Acute respiratory failure with hypoxia ([facility] [address]    Hypoxia          PLAN:  - Appreciate Hospitalist and Cardiology assistance with cares  - follow up was planned for this week in clinic, I will reschedule this for 2 weeks  - ASA/Plavix or other anticoagulant is ok from a surgery standpoint if needed after her cath  - will sign off, please let me know if surgery can be of further assistance      Ene Vargas MD

## 2020-09-01 ENCOUNTER — TELEPHONE (OUTPATIENT)
Dept: CARDIOLOGY CLINIC | Age: 65
End: 2020-09-01

## 2020-09-01 VITALS
DIASTOLIC BLOOD PRESSURE: 45 MMHG | SYSTOLIC BLOOD PRESSURE: 96 MMHG | BODY MASS INDEX: 33.65 KG/M2 | HEIGHT: 68 IN | RESPIRATION RATE: 16 BRPM | WEIGHT: 222 LBS | TEMPERATURE: 97.9 F | HEART RATE: 82 BPM | OXYGEN SATURATION: 97 %

## 2020-09-01 LAB
ANION GAP SERPL CALCULATED.3IONS-SCNC: 10 MMOL/L (ref 4–16)
BUN BLDV-MCNC: 17 MG/DL (ref 6–23)
CALCIUM SERPL-MCNC: 8.9 MG/DL (ref 8.3–10.6)
CHLORIDE BLD-SCNC: 103 MMOL/L (ref 99–110)
CO2: 25 MMOL/L (ref 21–32)
CREAT SERPL-MCNC: 0.9 MG/DL (ref 0.6–1.1)
GFR AFRICAN AMERICAN: >60 ML/MIN/1.73M2
GFR NON-AFRICAN AMERICAN: >60 ML/MIN/1.73M2
GLUCOSE BLD-MCNC: 91 MG/DL (ref 70–99)
MAGNESIUM: 2.1 MG/DL (ref 1.8–2.4)
POTASSIUM SERPL-SCNC: 4.2 MMOL/L (ref 3.5–5.1)
SODIUM BLD-SCNC: 138 MMOL/L (ref 135–145)

## 2020-09-01 PROCEDURE — 83735 ASSAY OF MAGNESIUM: CPT

## 2020-09-01 PROCEDURE — 6370000000 HC RX 637 (ALT 250 FOR IP): Performed by: INTERNAL MEDICINE

## 2020-09-01 PROCEDURE — 6360000002 HC RX W HCPCS: Performed by: INTERNAL MEDICINE

## 2020-09-01 PROCEDURE — 80048 BASIC METABOLIC PNL TOTAL CA: CPT

## 2020-09-01 PROCEDURE — APPSS30 APP SPLIT SHARED TIME 16-30 MINUTES: Performed by: NURSE PRACTITIONER

## 2020-09-01 PROCEDURE — 2580000003 HC RX 258: Performed by: INTERNAL MEDICINE

## 2020-09-01 PROCEDURE — 94761 N-INVAS EAR/PLS OXIMETRY MLT: CPT

## 2020-09-01 PROCEDURE — 36415 COLL VENOUS BLD VENIPUNCTURE: CPT

## 2020-09-01 RX ORDER — 0.9 % SODIUM CHLORIDE 0.9 %
500 INTRAVENOUS SOLUTION INTRAVENOUS ONCE
Status: DISCONTINUED | OUTPATIENT
Start: 2020-09-01 | End: 2020-09-01

## 2020-09-01 RX ORDER — METOPROLOL SUCCINATE 25 MG/1
25 TABLET, EXTENDED RELEASE ORAL DAILY
Qty: 30 TABLET | Refills: 3 | Status: SHIPPED | OUTPATIENT
Start: 2020-09-02 | End: 2020-09-18 | Stop reason: SDUPTHER

## 2020-09-01 RX ORDER — SODIUM CHLORIDE 0.9 % (FLUSH) 0.9 %
10 SYRINGE (ML) INJECTION PRN
Status: DISCONTINUED | OUTPATIENT
Start: 2020-09-01 | End: 2020-09-01

## 2020-09-01 RX ORDER — HYDRALAZINE HYDROCHLORIDE 20 MG/ML
10 INJECTION INTRAMUSCULAR; INTRAVENOUS EVERY 10 MIN PRN
Status: DISCONTINUED | OUTPATIENT
Start: 2020-09-01 | End: 2020-09-01 | Stop reason: HOSPADM

## 2020-09-01 RX ORDER — ACETAMINOPHEN 325 MG/1
650 TABLET ORAL EVERY 4 HOURS PRN
Status: DISCONTINUED | OUTPATIENT
Start: 2020-09-01 | End: 2020-09-01

## 2020-09-01 RX ORDER — PANTOPRAZOLE SODIUM 40 MG/1
40 TABLET, DELAYED RELEASE ORAL
Qty: 30 TABLET | Refills: 3 | Status: SHIPPED | OUTPATIENT
Start: 2020-09-01 | End: 2021-05-26

## 2020-09-01 RX ORDER — SODIUM CHLORIDE 0.9 % (FLUSH) 0.9 %
10 SYRINGE (ML) INJECTION EVERY 12 HOURS SCHEDULED
Status: DISCONTINUED | OUTPATIENT
Start: 2020-09-01 | End: 2020-09-01

## 2020-09-01 RX ORDER — FUROSEMIDE 40 MG/1
40 TABLET ORAL DAILY
Qty: 60 TABLET | Refills: 3 | Status: SHIPPED | OUTPATIENT
Start: 2020-09-02 | End: 2020-09-11

## 2020-09-01 RX ORDER — ATORVASTATIN CALCIUM 20 MG/1
20 TABLET, FILM COATED ORAL DAILY
Qty: 30 TABLET | Refills: 3 | Status: SHIPPED | OUTPATIENT
Start: 2020-09-01

## 2020-09-01 RX ADMIN — SODIUM CHLORIDE, PRESERVATIVE FREE 10 ML: 5 INJECTION INTRAVENOUS at 08:08

## 2020-09-01 RX ADMIN — ACYCLOVIR 200 MG: 200 CAPSULE ORAL at 08:07

## 2020-09-01 RX ADMIN — BUPROPION HYDROCHLORIDE 300 MG: 150 TABLET, EXTENDED RELEASE ORAL at 08:07

## 2020-09-01 RX ADMIN — POTASSIUM CHLORIDE 20 MEQ: 1500 TABLET, EXTENDED RELEASE ORAL at 08:08

## 2020-09-01 RX ADMIN — PANTOPRAZOLE SODIUM 40 MG: 40 TABLET, DELAYED RELEASE ORAL at 05:50

## 2020-09-01 RX ADMIN — SACUBITRIL AND VALSARTAN 1 TABLET: 24; 26 TABLET, FILM COATED ORAL at 08:07

## 2020-09-01 RX ADMIN — ENOXAPARIN SODIUM 40 MG: 40 INJECTION SUBCUTANEOUS at 08:08

## 2020-09-01 RX ADMIN — FUROSEMIDE 40 MG: 40 TABLET ORAL at 08:07

## 2020-09-01 RX ADMIN — METOPROLOL SUCCINATE 25 MG: 25 TABLET, EXTENDED RELEASE ORAL at 08:08

## 2020-09-01 ASSESSMENT — PAIN SCALES - GENERAL: PAINLEVEL_OUTOF10: 0

## 2020-09-01 NOTE — DISCHARGE SUMMARY
.        Discharge Summary    Name:  Zoe Santana /Age/Sex: 1955  (60 y.o. female)   MRN & CSN:  [id number] & [id number] Admission Date/Time: 2020 11:52 PM   Attending:  Sarah Al MD Discharging Physician: Sarah Al MD     HPI:   79-year-old female with history of recent cholecystectomy, presented to the ED with complaints of shortness of breath, abdominal distention, nausea and lower leg swelling. Patient denies any cardiac history and notes good health, including active lifestyle until recent admission for cholecystectomy. She had an associated cough, no history of sick contacts or COVID-19 patients. Work-up in the ED showed pulmonary edema with cardiomegaly. Patient denied any recent viral illnesses, no chronic alcohol use and notes been relatively healthy. Hospital Course:   Zoe Santana is a 60 y.o.  female  who presents with shortness of breath and leg swelling, evaluation showed new onset heart failure, was treated with IV diuretics, with improved clinical picture. She had left heart cath which showed patent coronary arteries, plan is for medical management and optimization of heart failure medications MUGA in about 3 months. No LifeVest per cardiology. She will be seen in heart failure clinic on Friday. 1.  Acute respiratory failure with hypoxia secondary to pulmonary edema  -Resolved    2. Acute decompensated systolic heart failure, new onset  -ECHO  reported with LVEF of 15 to 20% -no previous echos  -COVID negative  -S/p [facility] normal coronaries   -Received IV diuretics with improving clinical picture  -Started on Entresto, metoprolol, statin  -Will be followed up as outpatient       3. Non ischemic cardiomyopathy-started on medical management  -Plan for outpatient follow-up and optimization of medical management and MUGA as outpatient in about 3 months    4. Hypokalemia - due to diuresis -resolved     5.   Abdominal pain with indigestion -resolved, likely apparent thyromegaly or masses. RESP Clear to auscultation, no wheezes, rales or rhonchi. Symmetric chest movement while on room air. CARDIO/VASC S1/S2 auscultated. Regular rate without appreciable murmurs, rubs, or gallops. Peripheral pulses equal bilaterally and palpable. No peripheral edema. GI Abdomen is soft without significant tenderness, masses, or guarding. Bowel sounds are normoactive. Rectal exam deferred.  Childs catheter is not present. HEME/LYMPH No petechiae or ecchymoses. MSK No gross joint deformities. Spontaneous movement of all extremities  SKIN Normal coloration, warm, dry. NEURO Cranial nerves appear grossly intact, normal speech, no lateralizing weakness. PSYCH Awake, alert, oriented x 4. Affect appropriate. BMP/CBC  Recent Labs     08/30/20  0258 08/31/20  0111 09/01/20  0320    135 138   K 3.2* 3.9 4.2   CL 93* 98* 103   CO2 31 27 25   BUN 14 21 17   CREATININE 0.9 1.0 0.9       IMAGING    Ct Abdomen Pelvis W Iv Contrast    Result Date: 8/27/2020  EXAMINATION: CT OF THE ABDOMEN AND PELVIS WITH CONTRAST 8/27/2020 2:22 am TECHNIQUE: CT of the abdomen and pelvis was performed with the administration of intravenous contrast. Multiplanar reformatted images are provided for review. Dose modulation, iterative reconstruction, and/or weight based adjustment of the mA/kV was utilized to reduce the radiation dose to as low as reasonably achievable. COMPARISON: CT abdomen/pelvis 08/21/2020 and MRI abdomen 08/21/2020 HISTORY: ORDERING SYSTEM PROVIDED HISTORY: abdominal pain TECHNOLOGIST PROVIDED HISTORY: IV contrast only. Thank you. Reason for exam:->abdominal pain Reason for exam:->IV contrast only. Thank you. Reason for Exam: post op pain Acuity: Acute Type of Exam: Initial Additional signs and symptoms: recent gall bladder surgery Relevant Medical/Surgical History: isovue 370 80 ml FINDINGS: Lower Chest: Mild atelectasis at the lung bases. The heart is enlarged and unchanged. Organs: There is evidence of a recent cholecystectomy. There is postoperative packing and or fluid in the gallbladder fossa measuring 5.7 cm x 3.6 cm. There are few bubbles of air in the gallbladder fossa. The liver, spleen, pancreas, adrenal glands and kidneys are normal. GI/Bowel: There is a mild stool load throughout colon. Small bowel loops are normal in caliber. No bowel obstruction. The appendix is. Pelvis: Urinary bladder is unremarkable. The uterus is surgically absent. Peritoneum/Retroperitoneum: Minimal free air from the recent surgery. Fluid in the gallbladder fossa as described above. There is no adenopathy. Bones/Soft Tissues: Recent postoperative changes in the anterior abdominal wall with subcutaneous fat stranding and subcutaneous air. There is a small fat containing umbilical hernia. Postoperative packing and/or postoperative fluid/hematoma in the gallbladder fossa measuring 5.7 cm x 3.6 cm. Clinical correlation and follow-up recommended. There is no free fluid in the peritoneal cavity to suggest a bile leak. Otherwise normal/expected postoperative changes in the anterior abdominal wall. Result Date: 8/28/2020  EXAMINATION: DUPLEX VENOUS ULTRASOUND OF THE BILATERAL LOWER EXTREMITIES, 8/27/2020 11:26 am TECHNIQUE: Duplex ultrasound and Doppler images were obtained of the bilateral lower extremities COMPARISON: 6/25/2016 HISTORY: ORDERING SYSTEM PROVIDED HISTORY: rule out lower extremity DVT TECHNOLOGIST PROVIDED HISTORY: Reason for exam:->rule out lower extremity DVT Reason for Exam: bilateral leg swelling Type of Exam: Initial FINDINGS: The visualized veins of the bilateral lower extremities are patent and free of echogenic thrombus. The veins are normally compressible and have normal phasic flow. No evidence of DVT in either lower extremity.      Cta Pulmonary W Contrast    Result Date: 8/27/2020  EXAMINATION: CTA OF THE CHEST 8/27/2020 2:22 am TECHNIQUE: CTA of the chest was

## 2020-09-01 NOTE — PROGRESS NOTES
[facility] [place]  Laurita Kaba, Linh Nolan[address]  Phone: [phone]    Fax [phone]                  Beth Cordova MD, Dea Patton MD, [address], MD, MD Hero Reed MD Jurline Salvia, MD Roxianne Banker, APRN      Kaye Gallo, APRN  Cori Farnsworth, APRN    José Hodge, APRN    Cardiology Progress Note     Today's Plan:  OK FOR DISCHARGE FROM CARDIOLOGY STANDPOINT    Admit Date:  8/26/2020    Consult reason/ Seen today for: CHF    Subjective and  Overnight Events: patient is resting in bed. She is very emotional about diagnosis of low ejection fraction. Assessment / Plan / Recommendation:     1. New onset of acute decompensated heart failure: Ejection fraction 15 to 20%. LHC reveals patent coronary arteries. Non ischemic Cardiomyopathy. Patient appears to be euvolemic. Continue p.o. Lasix daily. Continue toprol xl 25 mg daily. HR is in the 80's. BP is soft. May need to add corlanor as an outpatient. Patient is tolerating Entresto well. We will plan for initiation of Aldactone as an outpatient. 2 g sodium diet. 2 L fluid restriction. Strict intake output and daily weights. Will send note to office to schedule for HF clinic on Friday. Patient needs a lot of education and emotional support. 2. Nonischemic cardiomyopathy: per guidelines- no life vest. Will optimize medications for 3 month with follow up MUGA in 3 months. 3. Recent cholecystectomy: Stable. per primary  4. DVT prophylaxis if not contraindicated while in the hospital.       History of Presenting Illness:    Chief complain on admission : 60 y. o.year old who is admitted for  Chief Complaint   Patient presents with    Abdominal Pain     gallbladder removed saturday morning    Shortness of Breath        Past medical history:    has a past medical history of H/O arthritis and History of IBS.   Past surgical history:   has a past surgical history that includes Colonoscopy (9-); Hysterectomy; and Cholecystectomy, laparoscopic (N/A, 8/22/2020). Social History:   reports that she has never smoked. She has never used smokeless tobacco. She reports current alcohol use. She reports current drug use. Drug: Marijuana. Family history:  family history is not on file. Allergies   Allergen Reactions    Keflex [Cephalexin]        Review of Systems  Review of Systems:    All 14 systems were reviewed and are negative  Except for the positive findings  which as documented     BP (!) 96/45   Pulse 82   Temp 97.9 °F (36.6 °C) (Oral)   Resp 16   Ht 5' 8\" (1.727 m)   Wt 222 lb (100.7 kg)   SpO2 97%   BMI 33.75 kg/m²     No intake or output data in the 24 hours ending 09/01/20 1128    Physical Exam  Vitals signs reviewed. Constitutional:       General: She is not in acute distress. Appearance: Normal appearance. She is not ill-appearing. Eyes:      Pupils: Pupils are equal, round, and reactive to light. Neck:      Musculoskeletal: Neck supple. No muscular tenderness. Vascular: No carotid bruit. Cardiovascular:      Rate and Rhythm: Normal rate and regular rhythm. Pulses: Normal pulses. Heart sounds: Murmur present. Pulmonary:      Effort: Pulmonary effort is normal. No respiratory distress. Breath sounds: Normal breath sounds. Musculoskeletal:         General: No swelling or deformity. Skin:     General: Skin is warm and dry. Capillary Refill: Capillary refill takes less than 2 seconds. Comments: Bilateral vascular discoloration to lower extremities  Right radial access site: minimal bruising. No hematoma. Neurological:      Mental Status: She is alert and oriented to person, place, and time. Psychiatric:         Mood and Affect: Mood normal.         Behavior: Behavior normal.         Thought Content:  Thought content normal.         Judgment: Judgment normal.         Telemetry Reviewed:   Sinus rhythm    Medications:    potassium chloride  20 mEq Oral BID    magnesium oxide  400 mg Oral Nightly    metoprolol succinate  25 mg Oral Daily    sacubitril-valsartan  1 tablet Oral BID    furosemide  40 mg Oral Daily    sodium chloride flush  10 mL Intravenous 2 times per day    enoxaparin  40 mg Subcutaneous Daily    acyclovir  200 mg Oral BID    buPROPion  300 mg Oral QAM    docusate sodium  100 mg Oral BID    polyethylene glycol  17 g Oral BID    pantoprazole  40 mg Oral BID AC       hydrALAZINE, magnesium sulfate, morphine, sodium chloride flush, acetaminophen **OR** acetaminophen, polyethylene glycol, promethazine **OR** ondansetron, calcium carbonate    Lab Data:  CBC:   No results for input(s): WBC, HGB, HCT, MCV, PLT in the last 72 hours. BMP:   Recent Labs     08/30/20  0258 08/31/20  0111 09/01/20  0320    135 138   K 3.2* 3.9 4.2   CL 93* 98* 103   CO2 31 27 25   BUN 14 21 17   CREATININE 0.9 1.0 0.9       ECHO :   Echocardiogram 8/27/2020 Summary   Left ventricular systolic function is abnormal.   Ejection fraction is visually estimated at 15-20%. Mild to moderate mitral regurgitation. Mild tricuspid regurgitation; RVSP: 53 mmHg consistent with moderate PHTN. No evidence of any pericardial effusion. All labs, medications and tests reviewed by myself , continue all other medications of all above medical condition listed as is except for changes mentioned above. Thank you very much for consult , please call with questions.     Electronically signed by ALBERTO Clark CNP on 9/1/2020 at 11:28 AM

## 2020-09-03 LAB
EKG ATRIAL RATE: 91 BPM
EKG DIAGNOSIS: NORMAL
EKG P AXIS: 50 DEGREES
EKG P-R INTERVAL: 148 MS
EKG Q-T INTERVAL: 406 MS
EKG QRS DURATION: 96 MS
EKG QTC CALCULATION (BAZETT): 499 MS
EKG R AXIS: -3 DEGREES
EKG T AXIS: 97 DEGREES
EKG VENTRICULAR RATE: 91 BPM

## 2020-09-04 ENCOUNTER — OFFICE VISIT (OUTPATIENT)
Dept: CARDIOLOGY CLINIC | Age: 65
End: 2020-09-04
Payer: MEDICARE

## 2020-09-04 VITALS
HEIGHT: 68 IN | WEIGHT: 207.8 LBS | DIASTOLIC BLOOD PRESSURE: 72 MMHG | BODY MASS INDEX: 31.49 KG/M2 | SYSTOLIC BLOOD PRESSURE: 104 MMHG | HEART RATE: 76 BPM

## 2020-09-04 PROBLEM — I42.8 NON-ISCHEMIC CARDIOMYOPATHY (HCC): Status: ACTIVE | Noted: 2020-09-04

## 2020-09-04 PROBLEM — I50.22 CHRONIC SYSTOLIC CONGESTIVE HEART FAILURE (HCC): Status: ACTIVE | Noted: 2020-09-04

## 2020-09-04 PROCEDURE — G8427 DOCREV CUR MEDS BY ELIG CLIN: HCPCS | Performed by: NURSE PRACTITIONER

## 2020-09-04 PROCEDURE — G8417 CALC BMI ABV UP PARAM F/U: HCPCS | Performed by: NURSE PRACTITIONER

## 2020-09-04 PROCEDURE — 99213 OFFICE O/P EST LOW 20 MIN: CPT | Performed by: NURSE PRACTITIONER

## 2020-09-04 RX ORDER — BLOOD PRESSURE TEST KIT
1 KIT MISCELLANEOUS DAILY
Qty: 1 KIT | Refills: 0 | Status: SHIPPED | OUTPATIENT
Start: 2020-09-04

## 2020-09-04 NOTE — PROGRESS NOTES
[facility]      Visit Date: 9/4/2020  Cardiologist:  Dr. Cooper Columbus  Primary Care Physician:  Bridget Horton, APRN - CNP    Gisele Steve is a 60 y.o. female who presents today for:  CC: No diagnosis found. HPI:   Gisele Steve is a 60 y.o. female who presents to the office for a new patient visit in the heart failure clinic. She has a history of nonischemic cardiomyopathy with LVEF 15 to 20%. She was recently seen in [facility] with complaints of shortness of breath. Echocardiogram gram done at that time showed severe left ventricular systolic dysfunction. She underwent left heart catheterization which showed nonischemic cardiomyopathy. LAD and circumflex are widely patent RCA was patent. Chief Complaint   Patient presents with    Heartburn    Shortness of Breath     Patient has:  Last hospital admission related to Heart Failure:  08/2020   baseline BNP 5,742     baseline weight unknown   Chest Pain: no  Worsening SOB/orthopnea/PND: no  Edema: trace  Any extra diuretic use: no  Weight gain: no  Compliant checking home weight: no  Does not have scale  Fatigue: yes  Abdominal bloating: no  Appetite: good  Difficulty sleeping: no  KULWANT: no  Cough: no  Compliant checking blood pressure: no  Compliant with medication regimen: yes    Vaccinations:  flu No  Vaccinations : pneumonia No      Device: No   ICD counseling:Yes     Activity: poor  Can you walk 1-2 blocks or do a moderate amount of house/yard work? No    NYHA Class: III   Class I   Patients with no limitation of activities; they suffer no symptoms from ordinary activities. Class II   Patients with slight, mild limitation of activity; they are comfortable with rest or with mild exertion.    Class III   Patients with marked limitation of activity; they are comfortable only at rest.   Class IV   Patients who should be at complete rest, confined to bed or chair; any physical activity brings on discomfort and symptoms occur at rest.    Sodium Restrictions: 2g  Fluid Restrictions: 48-64 oz/day  Sodium and fluid restriction compliance: yes      Past Medical History:   Diagnosis Date    H/O arthritis     09- Patient reports arthritis in Knee    History of IBS     09- Patient told she has IBS     Past Surgical History:   Procedure Laterality Date    CHOLECYSTECTOMY, LAPAROSCOPIC N/A 8/22/2020    CHOLECYSTECTOMY LAPAROSCOPIC performed by Anastacio Cai MD at [place] [address]  9-    HYSTERECTOMY      total     History reviewed. No pertinent family history. Social History     Tobacco Use    Smoking status: Never Smoker    Smokeless tobacco: Never Used   Substance Use Topics    Alcohol use: Not Currently     Comment: rarely     Current Outpatient Medications   Medication Sig Dispense Refill    Blood Pressure KIT 1 Units by Does not apply route daily 1 kit 0    pantoprazole (PROTONIX) 40 MG tablet Take 1 tablet by mouth 2 times daily (before meals) 30 tablet 3    furosemide (LASIX) 40 MG tablet Take 1 tablet by mouth daily 60 tablet 3    sacubitril-valsartan (ENTRESTO) 24-26 MG per tablet Take 1 tablet by mouth 2 times daily 60 tablet 3    metoprolol succinate (TOPROL XL) 25 MG extended release tablet Take 1 tablet by mouth daily 30 tablet 3    atorvastatin (LIPITOR) 20 MG tablet Take 1 tablet by mouth daily 30 tablet 3    ondansetron (ZOFRAN) 4 MG tablet Take 1 tablet by mouth 3 times daily as needed for Nausea or Vomiting 15 tablet 0    acyclovir (ZOVIRAX) 800 MG tablet Take 200 mg by mouth 2 times daily      buPROPion (WELLBUTRIN XL) 300 MG extended release tablet Take 300 mg by mouth every morning      loratadine (CLARITIN) 10 MG capsule Take 10 mg by mouth daily      docusate sodium (COLACE) 100 MG capsule Take 100 mg by mouth 2 times daily as needed for Constipation. No current facility-administered medications for this visit.       Allergies   Allergen Reactions    Keflex fine crackles to the right posterior base  Pulses: Bilateral radial and pedal pulses normal  Abdomen -  Soft no tenderness, non distended   Musculoskeletal - Normal movement of all extremities   Neurologic - alert and oriented: There are no gross focal neurologic abnormalities. Skin-  No rash: No echymosis   Affect- normal mood and pleasant     6 minute walk test:  Time walked patient unable to walk she is in a wheelchair      Most recent ECHO: 08/27/2020   Left ventricular systolic function is abnormal.   Ejection fraction is visually estimated at 15-20%. Mild to moderate mitral regurgitation. Mild tricuspid regurgitation; RVSP: 53 mmHg consistent with moderate PHTN. No evidence of any pericardial effusion.          Results reviewed:  BNP:   Lab Results   Component Value Date    PROBNP 5,724 (H) 08/27/2020     CBC:   Lab Results   Component Value Date    WBC 7.1 08/29/2020    RBC 4.33 08/29/2020    HGB 11.9 08/29/2020    HCT 38.0 08/29/2020     08/29/2020     CMP:    Lab Results   Component Value Date     09/01/2020    K 4.2 09/01/2020     09/01/2020    CO2 25 09/01/2020    BUN 17 09/01/2020    CREATININE 0.9 09/01/2020    GFRAA >60 09/01/2020    LABGLOM >60 09/01/2020    GLUCOSE 91 09/01/2020    CALCIUM 8.9 09/01/2020     Hepatic Function Panel:    Lab Results   Component Value Date    ALKPHOS 101 08/27/2020    ALT 54 08/27/2020    AST 29 08/27/2020    PROT 6.8 08/27/2020    PROT 7.0 07/29/2012    BILITOT 0.6 08/27/2020    BILIDIR 1.4 08/22/2020    IBILI 0.5 08/22/2020    LABALBU 3.8 08/27/2020     Magnesium:    Lab Results   Component Value Date    MG 2.1 09/01/2020     PT/INR:    Lab Results   Component Value Date    PROTIME 13.7 08/29/2020    INR 1.13 08/29/2020     Lipids:    Lab Results   Component Value Date    TRIG 32 08/22/2020    HDL 69 07/29/2012    LDLDIRECT 102 07/29/2012       Iron Studies:  No components found for: FE,  TIBC,  FERRITIN    Iron Deficiency Anemia:  No IV Iron Therapy:  No  2017 ACC/AHA HF Guidelines:   intravenous iron replacement in patients with New York Heart Association (NYHA) class II and III HF and iron deficiency (ferritin <100 ng/ml or 100-300 ng/ml if transferrin saturation <20%), to improve functional status and QoL. ASSESSMENT AND PLAN:     1. Chronic systolic congestive heart failure (HCC)  NYHA class II- III  EF 15-20%  Clinically she does not appear to be in fluid overload  She was recently started on Entresto-recommended continue present dose of Entresto we will bring patient back in 2 weeks for titration  Continue  guideline directed medical therapy with diuresis, beta-blocker, ARNI. Blood pressure is soft today so we will hold on MRA. Discussed in length with patient fluid restriction-recommend 40 to 64 ounces of fluid per day. Discussed in length with patient regarding sodium restriction  Recommend 1500 to 2000 mg of sodium per day  Instructed patient to weigh herself every day and to bring log to next office visit  Patient was giving information regarding diet, foods with sodium, foods with water content, and CHF   Weight gain of 3 pounds in 1 day or 5 pounds in 1 week-patient given weight log and requested to bring in on each visit   Increased shortness of breath   Shortness of breath while laying down   Cough   Chest pain   Swelling in feet, ankles or legs   Tenderness or bloating in the abdomen   Fatigue    Decreased appetite or nausea    Confusion   Plan to titrate medications every 2 weeks optimization of medical therapy  Recommend to recheck echocardiogram once medications have been optimized  If unable to raise EF with medical therapy will refer to electrophysiology for possible ICD    2. Non-ischemic cardiomyopathy ([address]  Recent cardiac cath shows normal coronaries  Ejection fraction 15 to 20%  Continue guideline directed medical therapy       Return for 2 week BP check.  or sooner if needed     Patient given educational

## 2020-09-07 ENCOUNTER — APPOINTMENT (OUTPATIENT)
Dept: CT IMAGING | Age: 65
DRG: 438 | End: 2020-09-07
Payer: MEDICARE

## 2020-09-07 ENCOUNTER — HOSPITAL ENCOUNTER (INPATIENT)
Age: 65
LOS: 3 days | Discharge: HOME | DRG: 438 | End: 2020-09-10
Attending: EMERGENCY MEDICINE | Admitting: INTERNAL MEDICINE
Payer: MEDICARE

## 2020-09-07 ENCOUNTER — APPOINTMENT (OUTPATIENT)
Dept: GENERAL RADIOLOGY | Age: 65
DRG: 438 | End: 2020-09-07
Payer: MEDICARE

## 2020-09-07 PROBLEM — K85.90 ACUTE ON CHRONIC PANCREATITIS (HCC): Status: ACTIVE | Noted: 2020-09-07

## 2020-09-07 PROBLEM — K86.1 ACUTE ON CHRONIC PANCREATITIS (HCC): Status: ACTIVE | Noted: 2020-09-07

## 2020-09-07 LAB
ALBUMIN SERPL-MCNC: 3.8 GM/DL (ref 3.4–5)
ALCOHOL SCREEN SERUM: <0.01 %WT/VOL
ALP BLD-CCNC: 224 IU/L (ref 40–129)
ALT SERPL-CCNC: 60 U/L (ref 10–40)
ANION GAP SERPL CALCULATED.3IONS-SCNC: 14 MMOL/L (ref 4–16)
AST SERPL-CCNC: 144 IU/L (ref 15–37)
BACTERIA: NEGATIVE /HPF
BASOPHILS ABSOLUTE: 0.1 K/CU MM
BASOPHILS RELATIVE PERCENT: 0.8 % (ref 0–1)
BILIRUB SERPL-MCNC: 1.2 MG/DL (ref 0–1)
BILIRUBIN URINE: NEGATIVE MG/DL
BLOOD, URINE: NEGATIVE
BUN BLDV-MCNC: 25 MG/DL (ref 6–23)
CALCIUM SERPL-MCNC: 9.6 MG/DL (ref 8.3–10.6)
CHLORIDE BLD-SCNC: 98 MMOL/L (ref 99–110)
CLARITY: CLEAR
CO2: 24 MMOL/L (ref 21–32)
COLOR: YELLOW
CREAT SERPL-MCNC: 1.6 MG/DL (ref 0.6–1.1)
DIFFERENTIAL TYPE: ABNORMAL
EOSINOPHILS ABSOLUTE: 0.1 K/CU MM
EOSINOPHILS RELATIVE PERCENT: 1.3 % (ref 0–3)
GFR AFRICAN AMERICAN: 40 ML/MIN/1.73M2
GFR NON-AFRICAN AMERICAN: 33 ML/MIN/1.73M2
GLUCOSE BLD-MCNC: 140 MG/DL (ref 70–99)
GLUCOSE, URINE: NEGATIVE MG/DL
HCT VFR BLD CALC: 42.1 % (ref 37–47)
HEMOGLOBIN: 13.4 GM/DL (ref 12.5–16)
HYALINE CASTS: 10 /LPF
IMMATURE NEUTROPHIL %: 0.2 % (ref 0–0.43)
KETONES, URINE: NEGATIVE MG/DL
LACTATE: 1.9 MMOL/L (ref 0.4–2)
LEUKOCYTE ESTERASE, URINE: NEGATIVE
LIPASE: >3000 IU/L (ref 13–60)
LYMPHOCYTES ABSOLUTE: 1.2 K/CU MM
LYMPHOCYTES RELATIVE PERCENT: 19.8 % (ref 24–44)
MCH RBC QN AUTO: 28.2 PG (ref 27–31)
MCHC RBC AUTO-ENTMCNC: 31.8 % (ref 32–36)
MCV RBC AUTO: 88.4 FL (ref 78–100)
MONOCYTES ABSOLUTE: 0.3 K/CU MM
MONOCYTES RELATIVE PERCENT: 5.4 % (ref 0–4)
MUCUS: ABNORMAL HPF
NITRITE URINE, QUANTITATIVE: NEGATIVE
NUCLEATED RBC %: 0 %
PDW BLD-RTO: 13.3 % (ref 11.7–14.9)
PH, URINE: 7 (ref 5–8)
PLATELET # BLD: 281 K/CU MM (ref 140–440)
PMV BLD AUTO: 9.9 FL (ref 7.5–11.1)
POTASSIUM SERPL-SCNC: 4 MMOL/L (ref 3.5–5.1)
PRO-BNP: 673.9 PG/ML
PROTEIN UA: NEGATIVE MG/DL
RBC # BLD: 4.76 M/CU MM (ref 4.2–5.4)
RBC URINE: ABNORMAL /HPF (ref 0–6)
SEGMENTED NEUTROPHILS ABSOLUTE COUNT: 4.4 K/CU MM
SEGMENTED NEUTROPHILS RELATIVE PERCENT: 72.5 % (ref 36–66)
SODIUM BLD-SCNC: 136 MMOL/L (ref 135–145)
SPECIFIC GRAVITY UA: 1.01 (ref 1–1.03)
SQUAMOUS EPITHELIAL: 3 /HPF
TOTAL IMMATURE NEUTOROPHIL: 0.01 K/CU MM
TOTAL NUCLEATED RBC: 0 K/CU MM
TOTAL PROTEIN: 7.2 GM/DL (ref 6.4–8.2)
TRANSITIONAL EPITHELIAL: <1 /HPF
TRICHOMONAS: ABNORMAL /HPF
TROPONIN T: <0.01 NG/ML
UROBILINOGEN, URINE: 1 MG/DL (ref 0.2–1)
WBC # BLD: 6.1 K/CU MM (ref 4–10.5)
WBC UA: 1 /HPF (ref 0–5)

## 2020-09-07 PROCEDURE — 74177 CT ABD & PELVIS W/CONTRAST: CPT

## 2020-09-07 PROCEDURE — 96374 THER/PROPH/DIAG INJ IV PUSH: CPT

## 2020-09-07 PROCEDURE — 84478 ASSAY OF TRIGLYCERIDES: CPT

## 2020-09-07 PROCEDURE — 93005 ELECTROCARDIOGRAM TRACING: CPT | Performed by: PHYSICIAN ASSISTANT

## 2020-09-07 PROCEDURE — 96375 TX/PRO/DX INJ NEW DRUG ADDON: CPT

## 2020-09-07 PROCEDURE — 71045 X-RAY EXAM CHEST 1 VIEW: CPT

## 2020-09-07 PROCEDURE — 2580000003 HC RX 258: Performed by: EMERGENCY MEDICINE

## 2020-09-07 PROCEDURE — 2000000000 HC ICU R&B

## 2020-09-07 PROCEDURE — 6360000002 HC RX W HCPCS: Performed by: PHYSICIAN ASSISTANT

## 2020-09-07 PROCEDURE — 6370000000 HC RX 637 (ALT 250 FOR IP): Performed by: PHYSICIAN ASSISTANT

## 2020-09-07 PROCEDURE — 85025 COMPLETE CBC W/AUTO DIFF WBC: CPT

## 2020-09-07 PROCEDURE — 83880 ASSAY OF NATRIURETIC PEPTIDE: CPT

## 2020-09-07 PROCEDURE — 6360000004 HC RX CONTRAST MEDICATION: Performed by: PHYSICIAN ASSISTANT

## 2020-09-07 PROCEDURE — 99285 EMERGENCY DEPT VISIT HI MDM: CPT

## 2020-09-07 PROCEDURE — 2500000003 HC RX 250 WO HCPCS: Performed by: PHYSICIAN ASSISTANT

## 2020-09-07 PROCEDURE — 96361 HYDRATE IV INFUSION ADD-ON: CPT

## 2020-09-07 PROCEDURE — 83605 ASSAY OF LACTIC ACID: CPT

## 2020-09-07 PROCEDURE — 80053 COMPREHEN METABOLIC PANEL: CPT

## 2020-09-07 PROCEDURE — G0480 DRUG TEST DEF 1-7 CLASSES: HCPCS

## 2020-09-07 PROCEDURE — 81001 URINALYSIS AUTO W/SCOPE: CPT

## 2020-09-07 PROCEDURE — 2580000003 HC RX 258: Performed by: NURSE PRACTITIONER

## 2020-09-07 PROCEDURE — 83690 ASSAY OF LIPASE: CPT

## 2020-09-07 PROCEDURE — 84484 ASSAY OF TROPONIN QUANT: CPT

## 2020-09-07 PROCEDURE — 2580000003 HC RX 258: Performed by: PHYSICIAN ASSISTANT

## 2020-09-07 RX ORDER — SODIUM CHLORIDE 0.9 % (FLUSH) 0.9 %
10 SYRINGE (ML) INJECTION PRN
Status: DISCONTINUED | OUTPATIENT
Start: 2020-09-07 | End: 2020-09-10 | Stop reason: HOSPADM

## 2020-09-07 RX ORDER — DOCUSATE SODIUM 100 MG/1
100 CAPSULE, LIQUID FILLED ORAL 2 TIMES DAILY PRN
Status: DISCONTINUED | OUTPATIENT
Start: 2020-09-07 | End: 2020-09-10 | Stop reason: HOSPADM

## 2020-09-07 RX ORDER — ATORVASTATIN CALCIUM 20 MG/1
20 TABLET, FILM COATED ORAL DAILY
Status: DISCONTINUED | OUTPATIENT
Start: 2020-09-08 | End: 2020-09-10 | Stop reason: HOSPADM

## 2020-09-07 RX ORDER — 0.9 % SODIUM CHLORIDE 0.9 %
1000 INTRAVENOUS SOLUTION INTRAVENOUS ONCE
Status: DISCONTINUED | OUTPATIENT
Start: 2020-09-07 | End: 2020-09-07

## 2020-09-07 RX ORDER — SODIUM CHLORIDE 0.9 % (FLUSH) 0.9 %
10 SYRINGE (ML) INJECTION 2 TIMES DAILY
Status: DISCONTINUED | OUTPATIENT
Start: 2020-09-07 | End: 2020-09-10 | Stop reason: HOSPADM

## 2020-09-07 RX ORDER — BUPROPION HYDROCHLORIDE 150 MG/1
300 TABLET ORAL EVERY MORNING
Status: DISCONTINUED | OUTPATIENT
Start: 2020-09-08 | End: 2020-09-10 | Stop reason: HOSPADM

## 2020-09-07 RX ORDER — SODIUM CHLORIDE 0.9 % (FLUSH) 0.9 %
10 SYRINGE (ML) INJECTION EVERY 12 HOURS SCHEDULED
Status: DISCONTINUED | OUTPATIENT
Start: 2020-09-07 | End: 2020-09-10 | Stop reason: HOSPADM

## 2020-09-07 RX ORDER — CETIRIZINE HYDROCHLORIDE 10 MG/1
10 TABLET ORAL DAILY
Status: DISCONTINUED | OUTPATIENT
Start: 2020-09-08 | End: 2020-09-10 | Stop reason: HOSPADM

## 2020-09-07 RX ORDER — ONDANSETRON 2 MG/ML
4 INJECTION INTRAMUSCULAR; INTRAVENOUS EVERY 6 HOURS PRN
Status: DISCONTINUED | OUTPATIENT
Start: 2020-09-07 | End: 2020-09-10 | Stop reason: HOSPADM

## 2020-09-07 RX ORDER — METOPROLOL SUCCINATE 25 MG/1
25 TABLET, EXTENDED RELEASE ORAL DAILY
Status: DISCONTINUED | OUTPATIENT
Start: 2020-09-08 | End: 2020-09-10 | Stop reason: HOSPADM

## 2020-09-07 RX ORDER — MAGNESIUM HYDROXIDE/ALUMINUM HYDROXICE/SIMETHICONE 120; 1200; 1200 MG/30ML; MG/30ML; MG/30ML
30 SUSPENSION ORAL ONCE
Status: COMPLETED | OUTPATIENT
Start: 2020-09-07 | End: 2020-09-07

## 2020-09-07 RX ORDER — 0.9 % SODIUM CHLORIDE 0.9 %
2000 INTRAVENOUS SOLUTION INTRAVENOUS ONCE
Status: COMPLETED | OUTPATIENT
Start: 2020-09-07 | End: 2020-09-07

## 2020-09-07 RX ORDER — PROMETHAZINE HYDROCHLORIDE 25 MG/1
12.5 TABLET ORAL EVERY 6 HOURS PRN
Status: DISCONTINUED | OUTPATIENT
Start: 2020-09-07 | End: 2020-09-10 | Stop reason: HOSPADM

## 2020-09-07 RX ORDER — ONDANSETRON 4 MG/1
4 TABLET, ORALLY DISINTEGRATING ORAL 3 TIMES DAILY PRN
Status: DISCONTINUED | OUTPATIENT
Start: 2020-09-07 | End: 2020-09-10 | Stop reason: HOSPADM

## 2020-09-07 RX ORDER — METOCLOPRAMIDE HYDROCHLORIDE 5 MG/ML
10 INJECTION INTRAMUSCULAR; INTRAVENOUS ONCE
Status: COMPLETED | OUTPATIENT
Start: 2020-09-07 | End: 2020-09-07

## 2020-09-07 RX ORDER — 0.9 % SODIUM CHLORIDE 0.9 %
500 INTRAVENOUS SOLUTION INTRAVENOUS ONCE
Status: COMPLETED | OUTPATIENT
Start: 2020-09-07 | End: 2020-09-07

## 2020-09-07 RX ORDER — ACETAMINOPHEN 325 MG/1
650 TABLET ORAL EVERY 4 HOURS PRN
Status: DISCONTINUED | OUTPATIENT
Start: 2020-09-07 | End: 2020-09-10 | Stop reason: HOSPADM

## 2020-09-07 RX ORDER — SODIUM CHLORIDE 9 MG/ML
INJECTION, SOLUTION INTRAVENOUS CONTINUOUS
Status: DISCONTINUED | OUTPATIENT
Start: 2020-09-07 | End: 2020-09-09

## 2020-09-07 RX ORDER — ONDANSETRON 2 MG/ML
4 INJECTION INTRAMUSCULAR; INTRAVENOUS ONCE
Status: COMPLETED | OUTPATIENT
Start: 2020-09-07 | End: 2020-09-07

## 2020-09-07 RX ORDER — ACYCLOVIR 200 MG/1
200 CAPSULE ORAL 2 TIMES DAILY
Status: DISCONTINUED | OUTPATIENT
Start: 2020-09-07 | End: 2020-09-10 | Stop reason: HOSPADM

## 2020-09-07 RX ORDER — PANTOPRAZOLE SODIUM 40 MG/1
40 TABLET, DELAYED RELEASE ORAL
Status: DISCONTINUED | OUTPATIENT
Start: 2020-09-08 | End: 2020-09-10 | Stop reason: HOSPADM

## 2020-09-07 RX ADMIN — FAMOTIDINE 20 MG: 10 INJECTION INTRAVENOUS at 16:29

## 2020-09-07 RX ADMIN — ALUMINUM HYDROXIDE, MAGNESIUM HYDROXIDE, AND SIMETHICONE 30 ML: 200; 200; 20 SUSPENSION ORAL at 16:29

## 2020-09-07 RX ADMIN — SODIUM CHLORIDE 2000 ML: 9 INJECTION, SOLUTION INTRAVENOUS at 17:42

## 2020-09-07 RX ADMIN — SODIUM CHLORIDE: 9 INJECTION, SOLUTION INTRAVENOUS at 23:51

## 2020-09-07 RX ADMIN — IOPAMIDOL 80 ML: 755 INJECTION, SOLUTION INTRAVENOUS at 18:23

## 2020-09-07 RX ADMIN — SODIUM CHLORIDE, PRESERVATIVE FREE 10 ML: 5 INJECTION INTRAVENOUS at 18:23

## 2020-09-07 RX ADMIN — ONDANSETRON 4 MG: 2 INJECTION INTRAMUSCULAR; INTRAVENOUS at 16:29

## 2020-09-07 RX ADMIN — SODIUM CHLORIDE 500 ML: 9 INJECTION, SOLUTION INTRAVENOUS at 17:16

## 2020-09-07 RX ADMIN — METOCLOPRAMIDE 10 MG: 5 INJECTION, SOLUTION INTRAMUSCULAR; INTRAVENOUS at 20:22

## 2020-09-07 ASSESSMENT — PAIN DESCRIPTION - ORIENTATION: ORIENTATION: RIGHT

## 2020-09-07 ASSESSMENT — PAIN DESCRIPTION - DESCRIPTORS: DESCRIPTORS: SHARP

## 2020-09-07 ASSESSMENT — PAIN DESCRIPTION - PAIN TYPE: TYPE: ACUTE PAIN

## 2020-09-07 ASSESSMENT — PAIN DESCRIPTION - FREQUENCY: FREQUENCY: CONTINUOUS

## 2020-09-07 ASSESSMENT — PAIN DESCRIPTION - LOCATION: LOCATION: ABDOMEN;FLANK;BACK

## 2020-09-07 ASSESSMENT — PAIN SCALES - GENERAL
PAINLEVEL_OUTOF10: 4
PAINLEVEL_OUTOF10: 0

## 2020-09-07 NOTE — ED TRIAGE NOTES
Pt to ED today for c/o RUQ abd pain that radiated to her right flank and into right side of her back, onset 2 hrs ago. Pt given Zofran 4 mg and Fentanyl 100 mcg IVP via EMS PTA. Pt recently released from hospital a week ago. Pt reports she had her gull bladder removed 8/22/2020 then had to be readmitted d/t a gull stone causing her pancreatitis.  Pt reports she also had a heart cath done while admitted to the hospital.

## 2020-09-07 NOTE — ED PROVIDER NOTES
ATTENDING NOTE:    I discussed this patient's history and physical findings and reviewed the PA's findings with them, as well as performed an independent assessment and coordinated care with them. My additional findings are:    HISTORY OF PRESENT ILLNESS:  Chief Complaint   Patient presents with    Abdominal Pain     RUQ    Flank Pain    Back Pain   . William Callaway is a 60 y.o. female who presents with complaints of a burning mid and right lower abdominal pain with onset within the several hours prior to arrival to the emergency department. States that it began after eating something. Has some associated nausea with nonbloody nonbilious emesis. PHYSICAL EXAM:  VITAL SIGNS:   ED Triage Vitals [09/07/20 1528]   Enc Vitals Group      /68      Pulse 92      Resp 16      Temp 98.9 °F (37.2 °C)      Temp Source Oral      SpO2 98 %      Weight 204 lb (92.5 kg)      Height 5' 8\" (1.727 m)      Head Circumference       Peak Flow       Pain Score       Pain Loc       Pain Edu? Excl. in [address]? Vitals during ED course were reviewed and are as charted. Key Physical Exam Findings:    Constitutional: Minimal distress, Non-toxic appearance    Eyes:  Conjunctiva normal, No discharge    HENT: Normocephalic, Atraumatic, Bilateral external ears normal, posterior oropharynx is nonerythematous and without exudate, uvula is midline, no trismus, no \"hot potato voice\" or dysphonia, oropharynx moist    Neck: Supple, no stridor, no grossly visible or palpable masses    Cardiovascular: Regular rate and rhythm, No murmurs, No rubs, No gallops    Pulmonary/Chest:  Normal breath sounds, No respiratory distress or accessory muscle use, No wheezing, crackles or rhonchi.      Abdomen: Right lower quadrant abdominal tenderness to palpation without peritoneal signs, otherwise abdomen is soft, nondistended and nonrigid, No tenderness or peritoneal signs elsewhere, No masses, normal bowel sounds    Back:  No midline Potassium 4.0 3.5 - 5.1 MMOL/L    Chloride 98 (L) 99 - 110 mMol/L    CO2 24 21 - 32 MMOL/L    BUN 25 (H) 6 - 23 MG/DL    CREATININE 1.6 (H) 0.6 - 1.1 MG/DL    Glucose 140 (H) 70 - 99 MG/DL    Calcium 9.6 8.3 - 10.6 MG/DL    Alb 3.8 3.4 - 5.0 GM/DL    Total Protein 7.2 6.4 - 8.2 GM/DL    Total Bilirubin 1.2 (H) 0.0 - 1.0 MG/DL    ALT 60 (H) 10 - 40 U/L     (H) 15 - 37 IU/L    Alkaline Phosphatase 224 (H) 40 - 129 IU/L    GFR Non- 33 (L) >60 mL/min/1.73m2    GFR  40 (L) >60 mL/min/1.73m2    Anion Gap 14 4 - 16   Troponin   Result Value Ref Range    Troponin T <0.010 <0.01 NG/ML   Brain Natriuretic Peptide   Result Value Ref Range    Pro-.9 (H) <300 PG/ML   Lipase   Result Value Ref Range    Lipase >3,000 (H) 13 - 60 IU/L   Urinalysis   Result Value Ref Range    Color, UA YELLOW YELLOW    Clarity, UA CLEAR CLEAR    Glucose, Urine NEGATIVE NEGATIVE MG/DL    Bilirubin Urine NEGATIVE NEGATIVE MG/DL    Ketones, Urine NEGATIVE NEGATIVE MG/DL    Specific Gravity, UA 1.012 1.001 - 1.035    Blood, Urine NEGATIVE NEGATIVE    pH, Urine 7.0 5.0 - 8.0    Protein, UA NEGATIVE NEGATIVE MG/DL    Urobilinogen, Urine 1 0.2 - 1.0 MG/DL    Nitrite Urine, Quantitative NEGATIVE NEGATIVE    Leukocyte Esterase, Urine NEGATIVE NEGATIVE    RBC, UA NONE SEEN 0 - 6 /HPF    WBC, UA 1 0 - 5 /HPF    Bacteria, UA NEGATIVE NEGATIVE /HPF    Squam Epithel, UA 3 /HPF    Trans Epithel, UA <1 /HPF    Mucus, UA RARE (A) NEGATIVE HPF    Trichomonas, UA NONE SEEN NONE SEEN /HPF    Hyaline Casts, UA 10 /LPF   Lactic Acid, Plasma   Result Value Ref Range    Lactate 1.9 0.4 - 2.0 mMOL/L   Ethanol   Result Value Ref Range    Alcohol Scrn <0.01 <0.01 %WT/VOL   EKG 12 Lead   Result Value Ref Range    Ventricular Rate 64 BPM    Atrial Rate 64 BPM    P-R Interval 172 ms    QRS Duration 90 ms    Q-T Interval 460 ms    QTc Calculation (Bazett) 474 ms    P Axis 50 degrees    R Axis -13 degrees    T Axis 76 degrees CODE  DIET NPO, NOW    I have personally viewed the imaging studies. The radiologist interpretation is:   CT ABDOMEN PELVIS W IV CONTRAST Additional Contrast? None   Final Result   1. There is a fluid collection remaining in the gallbladder fossa that is   decreased in size now measuring 3.0 x 2.5 cm when this previously measured   3.6 x 5.4 cm. This may represent a resolving seroma/hematoma. 2. No other acute findings in the abdomen or pelvis. XR CHEST PORTABLE   Final Result   No acute process.                MEDICATIONS ADMINISTERED:  Medications   sodium chloride flush 0.9 % injection 10 mL (10 mLs Intravenous Given 9/7/20 1823)   atorvastatin (LIPITOR) tablet 20 mg (has no administration in time range)   acyclovir (ZOVIRAX) capsule 200 mg (200 mg Oral Not Given 9/8/20 0014)   buPROPion (WELLBUTRIN XL) extended release tablet 300 mg (has no administration in time range)   docusate sodium (COLACE) capsule 100 mg (has no administration in time range)   pantoprazole (PROTONIX) tablet 40 mg (has no administration in time range)   ondansetron (ZOFRAN-ODT) disintegrating tablet 4 mg (has no administration in time range)   metoprolol succinate (TOPROL XL) extended release tablet 25 mg (has no administration in time range)   cetirizine (ZYRTEC) tablet 10 mg (has no administration in time range)   sodium chloride flush 0.9 % injection 10 mL (10 mLs Intravenous Not Given 9/8/20 0017)   sodium chloride flush 0.9 % injection 10 mL (has no administration in time range)   acetaminophen (TYLENOL) tablet 650 mg (has no administration in time range)   promethazine (PHENERGAN) tablet 12.5 mg (has no administration in time range)     Or   ondansetron (ZOFRAN) injection 4 mg (has no administration in time range)   enoxaparin (LOVENOX) injection 40 mg (has no administration in time range)   0.9 % sodium chloride infusion ( Intravenous New Bag 9/7/20 5951)   aluminum & magnesium hydroxide-simethicone ([address]) 173-343-50 MG/5ML suspension 30 mL (30 mLs Oral Given 9/7/20 1629)   ondansetron (ZOFRAN) injection 4 mg (4 mg Intravenous Given 9/7/20 1629)   famotidine (PEPCID) injection 20 mg (20 mg Intravenous Given 9/7/20 1629)   0.9 % sodium chloride bolus (0 mLs Intravenous Stopped 9/7/20 2028)   0.9 % sodium chloride bolus (0 mLs Intravenous Stopped 9/7/20 1907)   iopamidol (ISOVUE-370) 76 % injection 80 mL (80 mLs Intravenous Given 9/7/20 1823)   metoclopramide (REGLAN) injection 10 mg (10 mg Intravenous Given 9/7/20 2022)         PLAN/ED COURSE:  Last Vitals: BP 98/63   Pulse 65   Temp 97.8 °F (36.6 °C) (Oral)   Resp 16   Ht 5' 8\" (1.727 m)   Wt 204 lb (92.5 kg)   SpO2 100%   BMI 31.02 kg/m²       61-year-old female with pancreatitis. Differential diagnoses considered included, but were not limited to, acute appendicitis, acute cholecystitis/cholangitis, acute hepatitis, Hzfm-Zmti-Aevnlb Disease, Acute pancreatitis, acute coronary syndrome, acute intra-abdominal catastrophe, acute vascular emergency, bowel obstruction, perforated bowel, incarcerated or strangulated hernia, colitis, diverticulitis, ischemic bowel, cystitis, pyelonephritis. Additional workup and treatment in the ED as documented above and in the physician assistant's note. Pt will be admitted for further evaluation and treatment. Plan discussed with pt and/or family who expressed understanding and agreement with plan. Admitted in stable condition. Clinical Impression:  1. Acute pancreatitis, unspecified complication status, unspecified pancreatitis type        Disposition referral (if applicable):   Brodie Long, APRN - CNP  [facility]  [phone]            Disposition medications (if applicable):  Current Discharge Medication List          ED Provider Disposition Time  DISPOSITION Admitted 09/07/2020 08:49:32 PM            Electronically signed by Luis Caballero MD on 9/8/2020 at 1:58 AM        Pebbles Panda MD [place]  09/08/20 0159

## 2020-09-07 NOTE — ED PROVIDER NOTES
EMERGENCY DEPARTMENT ENCOUNTER      PCP: Isis Spencer, APRN - CNP    CHIEF COMPLAINT    Chief Complaint   Patient presents with    Abdominal Pain     RUQ    Flank Pain    Back Pain       I have seen and evaluated this patient with Dr. Arben Tariq. HPI    Mayela Rowley is a 60 y.o. female with history of recent heart catheterization and gallbladder removal/gallstone pancreatitis who presents with acute onset of sharp epigastric abdominal pain with radiation up into the throat that began about half hour after eating lunch. She said it felt the way it felt when she was having her heart issues last week. Began to belch and then a little while later the pain in her stomach and chest resolved and moved to her right flank and back, which was similar to the pain she had when she had the gallstone pancreatitis. She has had 1 or 2 episodes of dry heaving and vomiting, nonbloody. She denies recent fevers, shortness of breath. She said after she vomited her stomach pain resolved and she is feeling better right now.       REVIEW OF SYSTEMS    Constitutional:  Denies fever, chills, weight loss or weakness   HENT:  Denies sore throat or ear pain   Cardiovascular:  + chest pain, no palpitations   Respiratory:  Denies cough or shortness of breath    GI:  Denies abdominal pain, nausea, vomiting, no diarrhea  :  Denies any urinary symptoms    Musculoskeletal:  Denies back pain  Skin:  Denies rash  Neurologic:  Denies headache, focal weakness or sensory changes   Endocrine:  Denies polyuria or polydypsia   Lymphatic:  Denies swollen glands     All other review of systems are negative  See HPI and nursing notes for additional information     PAST MEDICAL AND SURGICAL HISTORY    Past Medical History:   Diagnosis Date    H/O arthritis     09- Patient reports arthritis in Knee    History of IBS     09- Patient told she has IBS    Pancreatitis      Past Surgical History:   Procedure Laterality Date    CARDIAC CATHETERIZATION      CHOLECYSTECTOMY, LAPAROSCOPIC N/A 8/22/2020    CHOLECYSTECTOMY LAPAROSCOPIC performed by Glenny Chavez MD at [place] [address]  9-    HYSTERECTOMY      total       CURRENT MEDICATIONS    Current Outpatient Rx   Medication Sig Dispense Refill    Blood Pressure KIT 1 Units by Does not apply route daily 1 kit 0    pantoprazole (PROTONIX) 40 MG tablet Take 1 tablet by mouth 2 times daily (before meals) 30 tablet 3    furosemide (LASIX) 40 MG tablet Take 1 tablet by mouth daily 60 tablet 3    sacubitril-valsartan (ENTRESTO) 24-26 MG per tablet Take 1 tablet by mouth 2 times daily 60 tablet 3    metoprolol succinate (TOPROL XL) 25 MG extended release tablet Take 1 tablet by mouth daily 30 tablet 3    atorvastatin (LIPITOR) 20 MG tablet Take 1 tablet by mouth daily 30 tablet 3    ondansetron (ZOFRAN) 4 MG tablet Take 1 tablet by mouth 3 times daily as needed for Nausea or Vomiting 15 tablet 0    acyclovir (ZOVIRAX) 800 MG tablet Take 200 mg by mouth 2 times daily      buPROPion (WELLBUTRIN XL) 300 MG extended release tablet Take 300 mg by mouth every morning      loratadine (CLARITIN) 10 MG capsule Take 10 mg by mouth daily      docusate sodium (COLACE) 100 MG capsule Take 100 mg by mouth 2 times daily as needed for Constipation.          ALLERGIES    Allergies   Allergen Reactions    Keflex [Cephalexin]        SOCIAL AND FAMILY HISTORY    Social History     Socioeconomic History    Marital status: Single     Spouse name: None    Number of children: None    Years of education: None    Highest education level: None   Occupational History    None   Social Needs    Financial resource strain: None    Food insecurity     Worry: None     Inability: None    Transportation needs     Medical: None     Non-medical: None   Tobacco Use    Smoking status: Never Smoker    Smokeless tobacco: Never Used   Substance and Sexual Activity    Alcohol use: Not Currently Comment: rarely    Drug use: Not Currently     Types: Marijuana    Sexual activity: None   Lifestyle    Physical activity     Days per week: None     Minutes per session: None    Stress: None   Relationships    Social connections     Talks on phone: None     Gets together: None     Attends Restoration service: None     Active member of club or organization: None     Attends meetings of clubs or organizations: None     Relationship status: None    Intimate partner violence     Fear of current or ex partner: None     Emotionally abused: None     Physically abused: None     Forced sexual activity: None   Other Topics Concern    None   Social History Narrative    None     History reviewed. No pertinent family history. PHYSICAL EXAM    VITAL SIGNS: BP 93/65   Pulse 81   Temp 98.9 °F (37.2 °C) (Oral)   Resp 18   Ht 5' 8\" (1.727 m)   Wt 204 lb (92.5 kg)   SpO2 100%   BMI 31.02 kg/m²    Constitutional:  Well developed, Well nourished  HENT:  Normocephalic, Atraumatic, PERRL. EOMI. Sclera clear. Conjunctiva normal, No discharge. Neck/Lymphatics: supple, no JVD, no swollen nodes  Cardiovascular:  Rate 81, reeg Rhythm,  no murmurs/rubs/gallops. Respiratory:  Nonlabored breathing. Normal breath sounds, No wheezing  Abdomen: Bowel sounds normal, Soft, RUG and epigastric tenderness, no masses. Musculoskeletal:    There is no edema, asymmetry, or calf / thigh tenderness bilaterally. No cyanosis. No cool or pale-appearing limb. Distal cap refill and pulses intact bilateral upper and lower extremities  Bilateral upper and lower extremity ROM intact without pain or obvious deficit  Integument:  Warm, Dry  Neurologic: Alert & oriented , No focal deficits noted. Cranial nerves II through XII grossly intact. Normal gross motor coordination & motor strength bilateral upper and lower extremities  Sensation intact.   Psychiatric:  Affect normal, Mood normal.       Results for orders placed or performed during the hospital encounter of 09/07/20   CBC Auto Differential   Result Value Ref Range    WBC 6.1 4.0 - 10.5 K/CU MM    RBC 4.76 4.2 - 5.4 M/CU MM    Hemoglobin 13.4 12.5 - 16.0 GM/DL    Hematocrit 42.1 37 - 47 %    MCV 88.4 78 - 100 FL    MCH 28.2 27 - 31 PG    MCHC 31.8 (L) 32.0 - 36.0 %    RDW 13.3 11.7 - 14.9 %    Platelets 776 920 - 770 K/CU MM    MPV 9.9 7.5 - 11.1 FL    Differential Type AUTOMATED DIFFERENTIAL     Segs Relative 72.5 (H) 36 - 66 %    Lymphocytes % 19.8 (L) 24 - 44 %    Monocytes % 5.4 (H) 0 - 4 %    Eosinophils % 1.3 0 - 3 %    Basophils % 0.8 0 - 1 %    Segs Absolute 4.4 K/CU MM    Lymphocytes Absolute 1.2 K/CU MM    Monocytes Absolute 0.3 K/CU MM    Eosinophils Absolute 0.1 K/CU MM    Basophils Absolute 0.1 K/CU MM    Nucleated RBC % 0.0 %    Total Nucleated RBC 0.0 K/CU MM    Total Immature Neutrophil 0.01 K/CU MM    Immature Neutrophil % 0.2 0 - 0.43 %   Comprehensive Metabolic Panel   Result Value Ref Range    Sodium 136 135 - 145 MMOL/L    Potassium 4.0 3.5 - 5.1 MMOL/L    Chloride 98 (L) 99 - 110 mMol/L    CO2 24 21 - 32 MMOL/L    BUN 25 (H) 6 - 23 MG/DL    CREATININE 1.6 (H) 0.6 - 1.1 MG/DL    Glucose 140 (H) 70 - 99 MG/DL    Calcium 9.6 8.3 - 10.6 MG/DL    Alb 3.8 3.4 - 5.0 GM/DL    Total Protein 7.2 6.4 - 8.2 GM/DL    Total Bilirubin 1.2 (H) 0.0 - 1.0 MG/DL    ALT 60 (H) 10 - 40 U/L     (H) 15 - 37 IU/L    Alkaline Phosphatase 224 (H) 40 - 129 IU/L    GFR Non- 33 (L) >60 mL/min/1.73m2    GFR  40 (L) >60 mL/min/1.73m2    Anion Gap 14 4 - 16   Troponin   Result Value Ref Range    Troponin T <0.010 <0.01 NG/ML   Brain Natriuretic Peptide   Result Value Ref Range    Pro-.9 (H) <300 PG/ML   Lipase   Result Value Ref Range    Lipase >3,000 (H) 13 - 60 IU/L   Urinalysis   Result Value Ref Range    Color, UA YELLOW YELLOW    Clarity, UA CLEAR CLEAR    Glucose, Urine NEGATIVE NEGATIVE MG/DL    Bilirubin Urine NEGATIVE NEGATIVE MG/DL Ketones, Urine NEGATIVE NEGATIVE MG/DL    Specific Gravity, UA 1.012 1.001 - 1.035    Blood, Urine NEGATIVE NEGATIVE    pH, Urine 7.0 5.0 - 8.0    Protein, UA NEGATIVE NEGATIVE MG/DL    Urobilinogen, Urine 1 0.2 - 1.0 MG/DL    Nitrite Urine, Quantitative NEGATIVE NEGATIVE    Leukocyte Esterase, Urine NEGATIVE NEGATIVE    RBC, UA NONE SEEN 0 - 6 /HPF    WBC, UA 1 0 - 5 /HPF    Bacteria, UA NEGATIVE NEGATIVE /HPF    Squam Epithel, UA 3 /HPF    Trans Epithel, UA <1 /HPF    Mucus, UA RARE (A) NEGATIVE HPF    Trichomonas, UA NONE SEEN NONE SEEN /HPF    Hyaline Casts, UA 10 /LPF   Lactic Acid, Plasma   Result Value Ref Range    Lactate 1.9 0.4 - 2.0 mMOL/L   EKG 12 Lead   Result Value Ref Range    Ventricular Rate 64 BPM    Atrial Rate 64 BPM    P-R Interval 172 ms    QRS Duration 90 ms    Q-T Interval 460 ms    QTc Calculation (Bazett) 474 ms    P Axis 50 degrees    R Axis -13 degrees    T Axis 76 degrees    Diagnosis       Normal sinus rhythm  Possible Left atrial enlargement  Left ventricular hypertrophy with repolarization abnormality  Abnormal ECG  When compared with ECG of 27-AUG-2020 00:08,  No significant change was found         CT ABDOMEN PELVIS W IV CONTRAST Additional Contrast? None   Preliminary Result   1. There is a fluid collection remaining in the gallbladder fossa that is   decreased in size now measuring 3.0 x 2.5 cm when this previously measured   3.6 x 5.4 cm. This may represent a resolving seroma/hematoma. 2. No other acute findings in the abdomen or pelvis. XR CHEST PORTABLE   Final Result   No acute process. ED COURSE & MEDICAL DECISION MAKING       Patient presents as above with a history of epigastric and right upper quadrant abdominal pain with belching that began earlier today. She has had some dry heaving and one episode of nonbloody vomit this. Vital signs are normal, she tries to keep her blood pressure under 595 systolic because of her cardiac issues.     I was considering possible cardiac cause but her work-up today has been normal.  Reassuring EKG, and no bump in the troponin, chest x-ray normal.  See attending note for interpretation of EKG. Routine labs reveal increased creatinine liver and bili enzymes elevated increased BUN and decreased kidney function. Patient's lipase is over 3000. Consulted with  and him the general surgeon who recommends patient be admitted for HIDA scan. They will do it tomorrow. I discussed the patient's case and presentation still be the hospitalist and she agrees to admit the patient. She was advised that the patient has had fluids but has strict fluid intake precautions per cardiology. She will consult with him. Patient agrees to return emergency department if symptoms worsen or any new symptoms develop. Vital signs and nursing notes reviewed during ED course. CRITICAL CARE NOTE:  There was a high probability of clinically significant life-threatening deterioration of the patient's condition requiring my urgent intervention due to possible ACS or intra-abdominal infection. Medication menstruation, consultation and chart review was performed to address this. Total critical care time is AT LEAST 10 minutes. This includes vital sign monitoring, pulse oximetry monitoring, telemetry monitoring, clinical response to the IV medications, reviewing the nursing notes, consultation time, dictation/documentation time, and interpretation of the lab work. This time excludes time spent performing procedures and separately billable procedures and family discussion time. Clinical  IMPRESSION    1.  Acute pancreatitis, unspecified complication status, unspecified pancreatitis type        Admitted    Comment: Please note this report has been produced using speech recognition software and may contain errors related to that system including errors in grammar, punctuation, and spelling, as well as words and phrases that may be inappropriate. If there are any questions or concerns please feel free to contact the dictating provider for clarification.           Rachel Keene  09/07/20 9647

## 2020-09-07 NOTE — ED NOTES
Report given to [facility] and care transferred at this time     Liliana Roca, TASHIA  09/07/20 9876

## 2020-09-07 NOTE — ED NOTES
Bed: ED-25  Expected date:   Expected time:   Means of arrival:   Comments:  ems     Artie Westbrook RN  09/07/20 4045

## 2020-09-08 ENCOUNTER — APPOINTMENT (OUTPATIENT)
Dept: NUCLEAR MEDICINE | Age: 65
DRG: 438 | End: 2020-09-08
Payer: MEDICARE

## 2020-09-08 LAB
ANION GAP SERPL CALCULATED.3IONS-SCNC: 12 MMOL/L (ref 4–16)
BUN BLDV-MCNC: 17 MG/DL (ref 6–23)
CALCIUM SERPL-MCNC: 8.6 MG/DL (ref 8.3–10.6)
CHLORIDE BLD-SCNC: 103 MMOL/L (ref 99–110)
CO2: 23 MMOL/L (ref 21–32)
CREAT SERPL-MCNC: 1.2 MG/DL (ref 0.6–1.1)
CREATININE URINE: 114.6 MG/DL
GFR AFRICAN AMERICAN: 55 ML/MIN/1.73M2
GFR NON-AFRICAN AMERICAN: 46 ML/MIN/1.73M2
GLUCOSE BLD-MCNC: 85 MG/DL (ref 70–99)
OSMOLALITY URINE: 567 MOS/L (ref 292–1090)
POTASSIUM SERPL-SCNC: 3.8 MMOL/L (ref 3.5–5.1)
SODIUM BLD-SCNC: 138 MMOL/L (ref 135–145)
SODIUM URINE: 26 MMOL/L (ref 35–167)
TRIGL SERPL-MCNC: 75 MG/DL

## 2020-09-08 PROCEDURE — A9537 TC99M MEBROFENIN: HCPCS | Performed by: PHYSICIAN ASSISTANT

## 2020-09-08 PROCEDURE — 2000000000 HC ICU R&B

## 2020-09-08 PROCEDURE — 2580000003 HC RX 258: Performed by: NURSE PRACTITIONER

## 2020-09-08 PROCEDURE — 93010 ELECTROCARDIOGRAM REPORT: CPT | Performed by: INTERNAL MEDICINE

## 2020-09-08 PROCEDURE — 36415 COLL VENOUS BLD VENIPUNCTURE: CPT

## 2020-09-08 PROCEDURE — 99221 1ST HOSP IP/OBS SF/LOW 40: CPT | Performed by: INTERNAL MEDICINE

## 2020-09-08 PROCEDURE — 6370000000 HC RX 637 (ALT 250 FOR IP): Performed by: NURSE PRACTITIONER

## 2020-09-08 PROCEDURE — 82570 ASSAY OF URINE CREATININE: CPT

## 2020-09-08 PROCEDURE — 83935 ASSAY OF URINE OSMOLALITY: CPT

## 2020-09-08 PROCEDURE — 3430000000 HC RX DIAGNOSTIC RADIOPHARMACEUTICAL: Performed by: PHYSICIAN ASSISTANT

## 2020-09-08 PROCEDURE — 2580000003 HC RX 258: Performed by: PHYSICIAN ASSISTANT

## 2020-09-08 PROCEDURE — 94761 N-INVAS EAR/PLS OXIMETRY MLT: CPT

## 2020-09-08 PROCEDURE — 84300 ASSAY OF URINE SODIUM: CPT

## 2020-09-08 PROCEDURE — 80048 BASIC METABOLIC PNL TOTAL CA: CPT

## 2020-09-08 PROCEDURE — 78227 HEPATOBIL SYST IMAGE W/DRUG: CPT

## 2020-09-08 RX ADMIN — ACYCLOVIR 200 MG: 200 CAPSULE ORAL at 21:14

## 2020-09-08 RX ADMIN — PANTOPRAZOLE SODIUM 40 MG: 40 TABLET, DELAYED RELEASE ORAL at 17:23

## 2020-09-08 RX ADMIN — SODIUM CHLORIDE, PRESERVATIVE FREE 10 ML: 5 INJECTION INTRAVENOUS at 09:37

## 2020-09-08 RX ADMIN — SODIUM CHLORIDE, PRESERVATIVE FREE 10 ML: 5 INJECTION INTRAVENOUS at 21:15

## 2020-09-08 RX ADMIN — ACYCLOVIR 200 MG: 200 CAPSULE ORAL at 09:31

## 2020-09-08 RX ADMIN — BUPROPION HYDROCHLORIDE 300 MG: 150 TABLET, FILM COATED, EXTENDED RELEASE ORAL at 09:27

## 2020-09-08 RX ADMIN — CETIRIZINE HYDROCHLORIDE 10 MG: 10 TABLET, FILM COATED ORAL at 09:31

## 2020-09-08 RX ADMIN — METOPROLOL SUCCINATE 25 MG: 25 TABLET, EXTENDED RELEASE ORAL at 09:30

## 2020-09-08 RX ADMIN — SODIUM CHLORIDE, PRESERVATIVE FREE 10 ML: 5 INJECTION INTRAVENOUS at 09:31

## 2020-09-08 RX ADMIN — ATORVASTATIN CALCIUM 20 MG: 20 TABLET, FILM COATED ORAL at 09:30

## 2020-09-08 RX ADMIN — Medication 6 MILLICURIE: at 09:40

## 2020-09-08 ASSESSMENT — PAIN SCALES - GENERAL
PAINLEVEL_OUTOF10: 0

## 2020-09-08 NOTE — PLAN OF CARE
Problem: Falls - Risk of:  Goal: Will remain free from falls  Description: Will remain free from falls  Outcome: Ongoing  Goal: Absence of physical injury  Description: Absence of physical injury  Outcome: Ongoing     Problem: Preoperative Routine:  Goal: Risk for injury before, during, or after surgery or procedure will decrease  Description: Risk for injury before, during, or after surgery or procedure will decrease  Outcome: Ongoing     Problem: Pain - Acute:  Goal: Recognizes and communicates pain  Description: Recognizes and communicates pain  Outcome: Ongoing

## 2020-09-08 NOTE — ED NOTES
Pt ambulated with cane and this nurse to restroom without incident     Jerrod Soto, TASHIA  09/07/20 0630

## 2020-09-08 NOTE — CARE COORDINATION
Attempted to meet with patient for discharge planning; she is off the unit for testing at this time. Jaylene Puckett RN     Chart reviewed. Patient is a readmission. She is from home; independent prior to admission. She has a PCP and insurance that assists with Rx when needed. Plan discharge to home when medically ready.  Jaylene Puckett RN

## 2020-09-08 NOTE — PROGRESS NOTES
Discussed with patient if she wanted to receive the flu vaccine, per BPA notice. Patient stated she doesn't take the flu vaccine because a doctor told her it would hurt her immune system, and she has worked with the public for 40 years. Attempted to educate patient on the benefits of the flu vaccine. Patient again stated she wasn't interested. Will continue to monitor.

## 2020-09-08 NOTE — H&P
History and Physical      Name:  Alphonso Allan /Age/Sex: 1955  (60 y.o. female)   MRN & CSN:  [id number] & [id number] Admission Date/Time: 2020  3:26 PM   Location:  ED25/ED-25 PCP: ALBERTO Allen - CNP       Admitting Physicians : Dr. Vandana Javier is a 60 y.o.  female  who presents with Abdominal Pain (RUQ); Flank Pain; and Back Pain    Assessment and Plan:   Back pain due to acute on chronic pancreatitis  # S/P  Cholecystectomy  CT abd: Fluid collection remaining in the gallbladder fossa is decreased in size now measuring 3.0x2.5 cm when this is previously measured 3.6 X 5.4 cm. This may represent a resolving seroma/hematoma. Lipase >3,000.  ALT:60. AST: 144  -Admit to ICU  -Received 2 liters in ED  - 50 ml/hr due to CHF  -Trend lipase  -General surgery consulted, plan for HIDA scan in am    VARGAS due to above/ATN  Creat 1.6  -Gentle hydration due to CHF  -Hold diuretics and  Entresto  -Avoid nephrotoxins  -Monitor BMP      Chronic  HFrEF, compensated  EF 15-20% (2020)  -Hold Lasix and Entresto due to VARGAS  -Continue Metoprolol  -Keep SBP<110  -Daily weight and strict I&O  -Cardiology consult    Depression  -Continue Wellbutrin    Herpes/Zoster  -Continue Acyclovir    Other chronic Issues  -Arthritis  -IBS  -Chronic microcytic anemia        DVT-PPX: Lovenox  Diet: Cardiac    Medications:   Medications:    sodium chloride flush  10 mL Intravenous BID      Infusions:   PRN Meds:      Current Facility-Administered Medications:     sodium chloride flush 0.9 % injection 10 mL, 10 mL, Intravenous, BID, RICARDA Hicks, 10 mL at 20 7753    Current Outpatient Medications:     Blood Pressure KIT, 1 Units by Does not apply route daily, Disp: 1 kit, Rfl: 0    pantoprazole (PROTONIX) 40 MG tablet, Take 1 tablet by mouth 2 times daily (before meals), Disp: 30 tablet, Rfl: 3    furosemide (LASIX) 40 MG tablet, Take 1 tablet by mouth daily, Disp: 60 tablet, Rfl: 3   sacubitril-valsartan (ENTRESTO) 24-26 MG per tablet, Take 1 tablet by mouth 2 times daily, Disp: 60 tablet, Rfl: 3    metoprolol succinate (TOPROL XL) 25 MG extended release tablet, Take 1 tablet by mouth daily, Disp: 30 tablet, Rfl: 3    atorvastatin (LIPITOR) 20 MG tablet, Take 1 tablet by mouth daily, Disp: 30 tablet, Rfl: 3    ondansetron (ZOFRAN) 4 MG tablet, Take 1 tablet by mouth 3 times daily as needed for Nausea or Vomiting, Disp: 15 tablet, Rfl: 0    acyclovir (ZOVIRAX) 800 MG tablet, Take 200 mg by mouth 2 times daily, Disp: , Rfl:     buPROPion (WELLBUTRIN XL) 300 MG extended release tablet, Take 300 mg by mouth every morning, Disp: , Rfl:     loratadine (CLARITIN) 10 MG capsule, Take 10 mg by mouth daily, Disp: , Rfl:     docusate sodium (COLACE) 100 MG capsule, Take 100 mg by mouth 2 times daily as needed for Constipation. , Disp: , Rfl:     History of present illness     Chief Complaint: Abdominal Pain (RUQ); Flank Pain; and Back Pain      Wing Nageotte is a 60 y.o.  female  who presents with 10 out of 10 constant sharp right upper quadrant abdominal pain with radiation to the back that started today after eating lunch. Patiently recently had cardiac cath and gallbladder removal secondary to gallstone pancreatitis. Patient states felt like the last time she had gallstone pancreatitis. Other associated symptoms dry heaves, nonbloody nonbilious emesis and belching. Denies fever, chest pain, potation, shortness of breath, diaphoretic, alcohol use, diarrhea, or constipation. History of IBS, CHF,  arthritis, depression, Herpes, and obesity     Review of Systems   Ten point ROS reviewed and negative, unless as noted below. GENERAL:  Denies fever, chills, night sweats, or changes in weight. EYES:  Denies recent visual changes. ENT:  Denies ear pain, hearing loss or tinnitus  RESP:  Denies any cough, dyspnea, or wheezing.   CV:  Denies any chest pain with exertion or at rest, palpitations, syncope, or edema. GI:  Denies any dysphagia, + nausea, vomiting, abdominal pain, heartburn, changes in bowel habit, melena or rectal bleeding  MUSCULOSKELETAL:  Denies any joint swelling, joint pain, or loss of range of motion. NEURO:  Denies any headaches, tremors, dizziness, vertigo, memory loss, confusion, weakness, numbness or tingling. PSYCH:  Denies any sleeping problems, history of abuse, marital discord. HEME/LYMPHATIC/IMMUNO:  Denies , bruising, bleeding abnormalities   ENDO:  Denies any heat or cold intolerance, panemiaolyuria or polydipsia. Objective:   No intake or output data in the 24 hours ending 09/07/20 2035   Vitals:   Vitals:    09/07/20 1914   BP: 93/65   Pulse: 81   Resp: 18   Temp:    SpO2: 100%     Physical Exam:   Gen:  awake, alert, cooperative, no apparent distress. Ill appearing  EYES:Lids and lashes normal, pupils equal, round ,extra ocular muscles intact, sclera clear, conjunctiva normal  ENT:  Normocephalic, oral pharynx with moist mucus membranes  NECK:  Supple, symmetrical, trachea midline, no adenopathy,  LUNGS:  Clear to auscultate bilaterally, no rales ronchi or wheezing noted. CARDIOVASCULAR:  regular rate and rhythm, normal S1 and S2,no murmur noted, peripheral pulses 2+, no pitting edema  ABDOMEN: Normal BS, mild RUQ tenderness, non distended, no HSM noted. Obese. No rebound or guarding  MUSCULOSKELETAL:  ROM of all extremities grossly wnl  NEUROLOGIC: AOx 3,  Cranial nerves II-XII are grossly intact. Motor is 5 out of 5 bilaterally. Sensory is intact, no lateralizing findings. SKIN:  no bruising or bleeding, pale, turgor, no redness, warmth, or swelling      Past Medical History:      Past Medical History:   Diagnosis Date    H/O arthritis     09- Patient reports arthritis in Knee    History of IBS     09- Patient told she has IBS    Pancreatitis      PSHX:  has a past surgical history that includes Colonoscopy (9-);  Hysterectomy; Cholecystectomy, laparoscopic (N/A, 8/22/2020); and Cardiac catheterization. Allergies: Allergies   Allergen Reactions    Keflex [Cephalexin]        FAM HX: Reviewed and noncontributory  Family history reviewed and is essentially negative unless as stated above.      Soc HX:   Social History     Socioeconomic History    Marital status: Single     Spouse name: None    Number of children: None    Years of education: None    Highest education level: None   Occupational History    None   Social Needs    Financial resource strain: None    Food insecurity     Worry: None     Inability: None    Transportation needs     Medical: None     Non-medical: None   Tobacco Use    Smoking status: Never Smoker    Smokeless tobacco: Never Used   Substance and Sexual Activity    Alcohol use: Not Currently     Comment: rarely    Drug use: Not Currently     Types: Marijuana    Sexual activity: None   Lifestyle    Physical activity     Days per week: None     Minutes per session: None    Stress: None   Relationships    Social connections     Talks on phone: None     Gets together: None     Attends Tenri[employer] service: None     Active member of club or organization: None     Attends meetings of clubs or organizations: None     Relationship status: None    Intimate partner violence     Fear of current or ex partner: None     Emotionally abused: None     Physically abused: None     Forced sexual activity: None   Other Topics Concern    None   Social History Narrative    None       Electronically signed by ALBERTO Nicolas CNP on 9/7/2020 at 8:35 PM

## 2020-09-08 NOTE — PROGRESS NOTES
Hospitalist Progress Note      Name:  Genevieve Bryant /Age/Sex: 1955  (60 y.o. female)   MRN & CSN:  [id number] & [id number] Admission Date/Time: 2020  3:26 PM   Location:  -A PCP: Ivy Osorio 112 Day: 2    Assessment and Plan:   Genevieve Bryant is a 60 y.o.  female  who presents with:     Acute on chronic pancreatitis s/p cholecystectomy. · Ct a/p +fluid around GB fossa  · GSx consulted, HIDA today   VARGAS/ATN due to poor PO intake. B/w ivf   C HFrEF, 15%.  Not exacerbated   Depression, wellbutrin   Herpes/zoster, acyclovir    HIDA today     Diet DIET CLEAR LIQUID;   DVT Prophylaxis [] Lovenox, []  Heparin, [] SCDs, []No VTE prophylaxis, patient ambulating   GI Prophylaxis [] PPI, [] H2 Blocker, [] No GI prophylaxis, patient is receiving diet/Tube Feeds   Code Status Full Code   Disposition Patient requires continued admission due to    MDM [] Low, [] Moderate,[]  High  Patient's risk as above due to      Subjective:     Pt was not S&E as she was off the floor    Objective:   No intake or output data in the 24 hours ending 20 1628   Vitals:   Vitals:    20 1551   BP:    Pulse: 76   Resp:    Temp:    SpO2:      Physical Exam: *     Not completed as she was off the floor     Medications:   Medications:    sodium chloride flush  10 mL Intravenous BID    atorvastatin  20 mg Oral Daily    acyclovir  200 mg Oral BID    buPROPion  300 mg Oral QAM    pantoprazole  40 mg Oral BID AC    metoprolol succinate  25 mg Oral Daily    cetirizine  10 mg Oral Daily    sodium chloride flush  10 mL Intravenous 2 times per day    enoxaparin  40 mg Subcutaneous Daily      Infusions:    sodium chloride 50 mL/hr at 20 2351     PRN Meds: docusate sodium, 100 mg, BID PRN  ondansetron, 4 mg, TID PRN  sodium chloride flush, 10 mL, PRN  acetaminophen, 650 mg, Q4H PRN  promethazine, 12.5 mg, Q6H PRN    Or  ondansetron, 4 mg, Q6H PRN          Electronically signed by Brent Christian DO on 9/8/2020 at 4:28 PM

## 2020-09-08 NOTE — CONSULTS
INPATIENT CARDIOLOGY CONSULT NOTE       Reason for consultation: Acute pancreatitis, history of heart failure with reduced ejection fraction    Referring physician:  Martinez Bowles MD     Primary care physician: ALBERTO Acuña - ALANA      Dear Martinez Bowles MD Thank you for the consult    Chief Complaint   Patient presents with    Abdominal Pain     RUQ    Flank Pain    Back Pain       History of present illness:Sabine is a 60 y. o.year old who  presents with  Chief Complaint   Patient presents with    Abdominal Pain     RUQ    Flank Pain    Back Pain       Patient is a 82-year-old female with prior medical history significant for severe nonischemic cardiomyopathy with LV ejection fraction of 15 to 20% diagnosed on recent heart cardiac catheterization end of August 2020, pulmonary hypertension, systolic congestive heart failure NYHA class II-III symptoms, hyperlipidemia, presents to the hospital overnight with chief complaint of abdominal pain, flank pain and back pain. Abdominal pain was periumbilical radiating to the back rated a 7 out of 10 felt like sharp pain associated with nausea and dry heaving with nonbilious emesis and belching. Patient denies any fever chills rigors. Patient denies any chest pain denies any shortness of breath or palpitation denies any pedal edema. Patient denies any PND or orthopnea. On arrival to the hospital patient is diagnosed with acute pancreatitis and is being treated for it. Cardiology is consulted today to evaluate the patient for recent diagnosis nonischemic severe cardiomyopathy and management medications. Past medical history:    has a past medical history of H/O arthritis, History of IBS, and Pancreatitis. Past surgical history:   has a past surgical history that includes Colonoscopy (9-); Hysterectomy; Cholecystectomy, laparoscopic (N/A, 8/22/2020); and Cardiac catheterization.   Social History:   reports that she has never smoked. She has never used smokeless tobacco. She reports previous alcohol use. She reports previous drug use. Drug: Marijuana.   Family history:   no family history of CAD, STROKE of DM    Allergies   Allergen Reactions    Keflex [Cephalexin]        sodium chloride flush 0.9 % injection 10 mL, BID  atorvastatin (LIPITOR) tablet 20 mg, Daily  acyclovir (ZOVIRAX) capsule 200 mg, BID  buPROPion (WELLBUTRIN XL) extended release tablet 300 mg, QAM  docusate sodium (COLACE) capsule 100 mg, BID PRN  pantoprazole (PROTONIX) tablet 40 mg, BID AC  ondansetron (ZOFRAN-ODT) disintegrating tablet 4 mg, TID PRN  metoprolol succinate (TOPROL XL) extended release tablet 25 mg, Daily  cetirizine (ZYRTEC) tablet 10 mg, Daily  sodium chloride flush 0.9 % injection 10 mL, 2 times per day  sodium chloride flush 0.9 % injection 10 mL, PRN  acetaminophen (TYLENOL) tablet 650 mg, Q4H PRN  promethazine (PHENERGAN) tablet 12.5 mg, Q6H PRN    Or  ondansetron (ZOFRAN) injection 4 mg, Q6H PRN  enoxaparin (LOVENOX) injection 40 mg, Daily  0.9 % sodium chloride infusion, Continuous      Current Facility-Administered Medications   Medication Dose Route Frequency Provider Last Rate Last Dose    sodium chloride flush 0.9 % injection 10 mL  10 mL Intravenous BID RICARDA Keene   10 mL at 09/08/20 0937    atorvastatin (LIPITOR) tablet 20 mg  20 mg Oral Daily Nell Reynoso, APRN - CNP   20 mg at 09/08/20 0930    acyclovir (ZOVIRAX) capsule 200 mg  200 mg Oral BID Nell Reynoso, APRN - CNP   200 mg at 09/08/20 0931    buPROPion (WELLBUTRIN XL) extended release tablet 300 mg  300 mg Oral QAM Nell Reynoso, APRN - CNP   300 mg at 09/08/20 5903    docusate sodium (COLACE) capsule 100 mg  100 mg Oral BID PRN Nell Reynoso, APRN - CNP        pantoprazole (PROTONIX) tablet 40 mg  40 mg Oral BID AC Karlene Sanchez, APRN - CNP        ondansetron (ZOFRAN-ODT) disintegrating tablet 4 mg  4 mg Oral TID PRN ALBERTO Almazan - CNP       [facility] metoprolol succinate (TOPROL XL) extended release tablet 25 mg  25 mg Oral Daily Doris Shillings, APRN - CNP   25 mg at 09/08/20 0930    cetirizine (ZYRTEC) tablet 10 mg  10 mg Oral Daily Doris Shillings, APRN - CNP   10 mg at 09/08/20 0931    sodium chloride flush 0.9 % injection 10 mL  10 mL Intravenous 2 times per day Doris Shillings, APRN - CNP   10 mL at 09/08/20 0931    sodium chloride flush 0.9 % injection 10 mL  10 mL Intravenous PRN Doris Shillings, APRN - CNP        acetaminophen (TYLENOL) tablet 650 mg  650 mg Oral Q4H PRN Doris Shillings, APRN - CNP        promethazine (PHENERGAN) tablet 12.5 mg  12.5 mg Oral Q6H PRN Doris Shillings, APRN - CNP        Or    ondansetron (ZOFRAN) injection 4 mg  4 mg Intravenous Q6H PRN Doris Shillings, APRN - CNP        enoxaparin (LOVENOX) injection 40 mg  40 mg Subcutaneous Daily Doris Shillings, APRN - CNP   Stopped at 09/08/20 0935    0.9 % sodium chloride infusion   Intravenous Continuous Doris Shillings, APRN - CNP 50 mL/hr at 09/07/20 2271           Review of Systems:     · Constitutional: No Fever or Weight Loss   · Eyes: No Decreased Vision  · ENT: No Headaches, Hearing Loss or Vertigo  · Cardiovascular: No chest pain, dyspnea on exertion, palpitations or loss of consciousness  · Respiratory: No cough or wheezing    · Gastrointestinal: As above   · genitourinary: No dysuria, trouble voiding, or hematuria  · Musculoskeletal:  No gait disturbance, weakness or joint complaints  · Integumentary: No rash or pruritis  · Neurological: No TIA or stroke symptoms  · Psychiatric: No anxiety or depression  · Endocrine: No malaise, fatigue or temperature intolerance  · Hematologic/Lymphatic: No bleeding problems, blood clots or swollen lymph nodes  · Allergic/Immunologic: No nasal congestion or hives    All other systems were reviewed and were negative otherwise.          Physical Examination:      Vitals:    09/08/20 1300   BP:    Pulse: 74   Resp:    Temp:    SpO2: Wt Readings from Last 3 Encounters:   09/08/20 215 lb (97.5 kg)   09/04/20 207 lb 12.8 oz (94.3 kg)   08/31/20 222 lb (100.7 kg)     Body mass index is 32.69 kg/m². General Appearance:  No distress, conversant    Constitutional:  Well developed, Well nourished, No acute distress  HENT:  Normocephalic, Atraumatic, Oropharynx moist, No oral exudates, Nose normal.   Neck Supple  Carotid: no carotid bruit  Eyes:  Conjunctiva normal, No discharge. Respiratory:  Normal breath sounds, No respiratory distress, No wheezing, no use of accessory muscles, diaphragm movement is normal  No chest Tenderness  Cardiovascular: S1-S2 No murmurs auscultated. No S3-S4. No rubs, thrills or gallops. Normal rhythm. pedal pulses are normal. No pedal edema  GI: Periumbilical tender, non distended. Bowel sounds sluggish but present   :  No CVA tenderness. Musculoskeletal:   No tenderness, No cyanosis, No clubbing. Good range of motion in all major joints. No tenderness to palpation or major deformities noted. Back- No tenderness. Integument:  Warm, Dry, No erythema, No rash. Lymphatic:  No lymphadenopathy noted. Neurologic:  Alert & oriented x 3  No focal deficits noted. Psychiatric:  Affect normal, Judgment normal, Mood normal.       Lab Review     Recent Labs     09/07/20  1625   WBC 6.1   HGB 13.4   HCT 42.1         Recent Labs     09/07/20  1625      K 4.0   CL 98*   CO2 24   BUN 25*   CREATININE 1.6*     Recent Labs     09/07/20  1625   *   ALT 60*   BILITOT 1.2*   ALKPHOS 224*     No results for input(s): TROPONINI in the last 72 hours. No results found for: BNP  Lab Results   Component Value Date    INR 1.13 08/29/2020    PROTIME 13.7 08/29/2020         EKG:Normal sinus rhythm with LVH    Chest Xray: No acute process    ECHO: LVEF 15 to 20%, RVSP 53 mmHg, moderate MR    CATH: Normal coronary anatomy    All labs, images, EKGs were personally reviewed      Assessment: 60 y. o.year old with PMH of

## 2020-09-09 LAB
ALBUMIN SERPL-MCNC: 2.9 GM/DL (ref 3.4–5)
ALP BLD-CCNC: 164 IU/L (ref 40–129)
ALT SERPL-CCNC: 74 U/L (ref 10–40)
ANION GAP SERPL CALCULATED.3IONS-SCNC: 10 MMOL/L (ref 4–16)
AST SERPL-CCNC: 84 IU/L (ref 15–37)
BASOPHILS ABSOLUTE: 0 K/CU MM
BASOPHILS RELATIVE PERCENT: 1.2 % (ref 0–1)
BILIRUB SERPL-MCNC: 0.4 MG/DL (ref 0–1)
BUN BLDV-MCNC: 11 MG/DL (ref 6–23)
CALCIUM SERPL-MCNC: 7.2 MG/DL (ref 8.3–10.6)
CHLORIDE BLD-SCNC: 111 MMOL/L (ref 99–110)
CO2: 22 MMOL/L (ref 21–32)
CREAT SERPL-MCNC: 0.9 MG/DL (ref 0.6–1.1)
DIFFERENTIAL TYPE: ABNORMAL
EOSINOPHILS ABSOLUTE: 0.1 K/CU MM
EOSINOPHILS RELATIVE PERCENT: 3.6 % (ref 0–3)
GFR AFRICAN AMERICAN: >60 ML/MIN/1.73M2
GFR NON-AFRICAN AMERICAN: >60 ML/MIN/1.73M2
GLUCOSE BLD-MCNC: 74 MG/DL (ref 70–99)
HCT VFR BLD CALC: 32.8 % (ref 37–47)
HEMOGLOBIN: 10.1 GM/DL (ref 12.5–16)
IMMATURE NEUTROPHIL %: 0.3 % (ref 0–0.43)
LYMPHOCYTES ABSOLUTE: 1.1 K/CU MM
LYMPHOCYTES RELATIVE PERCENT: 33.5 % (ref 24–44)
MAGNESIUM: 1.8 MG/DL (ref 1.8–2.4)
MCH RBC QN AUTO: 28.1 PG (ref 27–31)
MCHC RBC AUTO-ENTMCNC: 30.8 % (ref 32–36)
MCV RBC AUTO: 91.1 FL (ref 78–100)
MONOCYTES ABSOLUTE: 0.3 K/CU MM
MONOCYTES RELATIVE PERCENT: 9.9 % (ref 0–4)
NUCLEATED RBC %: 0 %
PDW BLD-RTO: 13.3 % (ref 11.7–14.9)
PLATELET # BLD: 169 K/CU MM (ref 140–440)
PMV BLD AUTO: 10.4 FL (ref 7.5–11.1)
POTASSIUM SERPL-SCNC: 3.3 MMOL/L (ref 3.5–5.1)
RBC # BLD: 3.6 M/CU MM (ref 4.2–5.4)
SEGMENTED NEUTROPHILS ABSOLUTE COUNT: 1.7 K/CU MM
SEGMENTED NEUTROPHILS RELATIVE PERCENT: 51.5 % (ref 36–66)
SODIUM BLD-SCNC: 143 MMOL/L (ref 135–145)
TOTAL IMMATURE NEUTOROPHIL: 0.01 K/CU MM
TOTAL NUCLEATED RBC: 0 K/CU MM
TOTAL PROTEIN: 4.6 GM/DL (ref 6.4–8.2)
WBC # BLD: 3.3 K/CU MM (ref 4–10.5)

## 2020-09-09 PROCEDURE — 2580000003 HC RX 258: Performed by: NURSE PRACTITIONER

## 2020-09-09 PROCEDURE — APPNB45 APP NON BILLABLE 31-45 MINUTES: Performed by: PHYSICIAN ASSISTANT

## 2020-09-09 PROCEDURE — 99024 POSTOP FOLLOW-UP VISIT: CPT | Performed by: PHYSICIAN ASSISTANT

## 2020-09-09 PROCEDURE — 94761 N-INVAS EAR/PLS OXIMETRY MLT: CPT

## 2020-09-09 PROCEDURE — 6370000000 HC RX 637 (ALT 250 FOR IP): Performed by: NURSE PRACTITIONER

## 2020-09-09 PROCEDURE — 80053 COMPREHEN METABOLIC PANEL: CPT

## 2020-09-09 PROCEDURE — 6370000000 HC RX 637 (ALT 250 FOR IP): Performed by: STUDENT IN AN ORGANIZED HEALTH CARE EDUCATION/TRAINING PROGRAM

## 2020-09-09 PROCEDURE — 6370000000 HC RX 637 (ALT 250 FOR IP): Performed by: INTERNAL MEDICINE

## 2020-09-09 PROCEDURE — 85025 COMPLETE CBC W/AUTO DIFF WBC: CPT

## 2020-09-09 PROCEDURE — 2580000003 HC RX 258: Performed by: PHYSICIAN ASSISTANT

## 2020-09-09 PROCEDURE — 1200000000 HC SEMI PRIVATE

## 2020-09-09 PROCEDURE — 83735 ASSAY OF MAGNESIUM: CPT

## 2020-09-09 PROCEDURE — 99233 SBSQ HOSP IP/OBS HIGH 50: CPT | Performed by: INTERNAL MEDICINE

## 2020-09-09 RX ORDER — TRAZODONE HYDROCHLORIDE 50 MG/1
50 TABLET ORAL NIGHTLY
Status: DISCONTINUED | OUTPATIENT
Start: 2020-09-09 | End: 2020-09-10 | Stop reason: HOSPADM

## 2020-09-09 RX ORDER — MAGNESIUM HYDROXIDE/ALUMINUM HYDROXICE/SIMETHICONE 120; 1200; 1200 MG/30ML; MG/30ML; MG/30ML
30 SUSPENSION ORAL ONCE
Status: COMPLETED | OUTPATIENT
Start: 2020-09-09 | End: 2020-09-09

## 2020-09-09 RX ORDER — LISINOPRIL 5 MG/1
5 TABLET ORAL DAILY
Status: DISCONTINUED | OUTPATIENT
Start: 2020-09-09 | End: 2020-09-10 | Stop reason: HOSPADM

## 2020-09-09 RX ORDER — LIDOCAINE HYDROCHLORIDE 20 MG/ML
15 SOLUTION OROPHARYNGEAL ONCE
Status: COMPLETED | OUTPATIENT
Start: 2020-09-09 | End: 2020-09-09

## 2020-09-09 RX ORDER — CALCIUM CARBONATE 200(500)MG
500 TABLET,CHEWABLE ORAL 3 TIMES DAILY PRN
Status: DISCONTINUED | OUTPATIENT
Start: 2020-09-09 | End: 2020-09-10 | Stop reason: HOSPADM

## 2020-09-09 RX ORDER — SUCRALFATE 1 G/1
2 TABLET ORAL 2 TIMES DAILY
Status: DISCONTINUED | OUTPATIENT
Start: 2020-09-09 | End: 2020-09-10 | Stop reason: HOSPADM

## 2020-09-09 RX ORDER — HYDROXYZINE PAMOATE 25 MG/1
25 CAPSULE ORAL 3 TIMES DAILY PRN
Status: DISCONTINUED | OUTPATIENT
Start: 2020-09-09 | End: 2020-09-10 | Stop reason: HOSPADM

## 2020-09-09 RX ADMIN — TRAZODONE HYDROCHLORIDE 50 MG: 50 TABLET ORAL at 20:18

## 2020-09-09 RX ADMIN — SODIUM CHLORIDE, PRESERVATIVE FREE 10 ML: 5 INJECTION INTRAVENOUS at 20:19

## 2020-09-09 RX ADMIN — PANTOPRAZOLE SODIUM 40 MG: 40 TABLET, DELAYED RELEASE ORAL at 08:59

## 2020-09-09 RX ADMIN — HYDROXYZINE PAMOATE 25 MG: 25 CAPSULE ORAL at 20:18

## 2020-09-09 RX ADMIN — PANTOPRAZOLE SODIUM 40 MG: 40 TABLET, DELAYED RELEASE ORAL at 17:26

## 2020-09-09 RX ADMIN — LISINOPRIL 5 MG: 5 TABLET ORAL at 12:29

## 2020-09-09 RX ADMIN — ACYCLOVIR 200 MG: 200 CAPSULE ORAL at 08:59

## 2020-09-09 RX ADMIN — ATORVASTATIN CALCIUM 20 MG: 20 TABLET, FILM COATED ORAL at 08:59

## 2020-09-09 RX ADMIN — SERTRALINE HYDROCHLORIDE 50 MG: 50 TABLET ORAL at 20:18

## 2020-09-09 RX ADMIN — CETIRIZINE HYDROCHLORIDE 10 MG: 10 TABLET, FILM COATED ORAL at 08:59

## 2020-09-09 RX ADMIN — LIDOCAINE HYDROCHLORIDE 15 ML: 20 SOLUTION ORAL; TOPICAL at 15:12

## 2020-09-09 RX ADMIN — CALCIUM CARBONATE 500 MG: 500 TABLET, CHEWABLE ORAL at 20:18

## 2020-09-09 RX ADMIN — HYDROXYZINE PAMOATE 25 MG: 25 CAPSULE ORAL at 17:40

## 2020-09-09 RX ADMIN — ALUMINUM HYDROXIDE, MAGNESIUM HYDROXIDE, AND SIMETHICONE 30 ML: 200; 200; 20 SUSPENSION ORAL at 15:01

## 2020-09-09 RX ADMIN — BUPROPION HYDROCHLORIDE 300 MG: 150 TABLET, FILM COATED, EXTENDED RELEASE ORAL at 08:59

## 2020-09-09 RX ADMIN — SODIUM CHLORIDE, PRESERVATIVE FREE 10 ML: 5 INJECTION INTRAVENOUS at 09:03

## 2020-09-09 RX ADMIN — ACETAMINOPHEN 650 MG: 325 TABLET ORAL at 20:17

## 2020-09-09 RX ADMIN — SODIUM CHLORIDE, PRESERVATIVE FREE 10 ML: 5 INJECTION INTRAVENOUS at 20:20

## 2020-09-09 RX ADMIN — METOPROLOL SUCCINATE 25 MG: 25 TABLET, EXTENDED RELEASE ORAL at 08:59

## 2020-09-09 RX ADMIN — CALCIUM CARBONATE 500 MG: 500 TABLET, CHEWABLE ORAL at 19:07

## 2020-09-09 RX ADMIN — ACETAMINOPHEN 650 MG: 325 TABLET ORAL at 09:10

## 2020-09-09 RX ADMIN — PROMETHAZINE HYDROCHLORIDE 12.5 MG: 25 TABLET ORAL at 20:18

## 2020-09-09 RX ADMIN — ACYCLOVIR 200 MG: 200 CAPSULE ORAL at 20:17

## 2020-09-09 RX ADMIN — SUCRALFATE 2 G: 1 TABLET ORAL at 20:19

## 2020-09-09 ASSESSMENT — ENCOUNTER SYMPTOMS
STRIDOR: 0
EYE ITCHING: 0
CONSTIPATION: 0
BACK PAIN: 0
RECTAL PAIN: 0
EYE REDNESS: 0
CHOKING: 0
ABDOMINAL PAIN: 1
SORE THROAT: 0
ANAL BLEEDING: 0
APNEA: 0
COLOR CHANGE: 0
PHOTOPHOBIA: 0

## 2020-09-09 ASSESSMENT — PAIN SCALES - GENERAL
PAINLEVEL_OUTOF10: 0
PAINLEVEL_OUTOF10: 1
PAINLEVEL_OUTOF10: 2
PAINLEVEL_OUTOF10: 0
PAINLEVEL_OUTOF10: 0

## 2020-09-09 NOTE — PLAN OF CARE
Problem: Falls - Risk of:  Goal: Will remain free from falls  Description: Will remain free from falls  9/8/2020 2206 by Td Mahmood RN  Outcome: Ongoing  Note: Patient doing well with standby assist  9/8/2020 0918 by Vincenzo Mckeon RN  Outcome: Ongoing  Goal: Absence of physical injury  Description: Absence of physical injury  9/8/2020 2206 by Td Mahmood RN  Outcome: Ongoing  Note: No new injuries noted   9/8/2020 0918 by Vincenzo Mckeon RN  Outcome: Ongoing     Problem: Preoperative Routine:  Goal: Risk for injury before, during, or after surgery or procedure will decrease  Description: Risk for injury before, during, or after surgery or procedure will decrease  9/8/2020 0918 by Vincenzo Mckeon RN  Outcome: Ongoing     Problem: Pain - Acute:  Goal: Recognizes and communicates pain  Description: Recognizes and communicates pain  9/8/2020 2206 by Td Mahmood RN  Outcome: Ongoing  Note: Denies pain at this time  9/8/2020 0918 by Vincenzo Mckeon RN  Outcome: Ongoing

## 2020-09-09 NOTE — PROGRESS NOTES
Hospitalist Progress Note      Name:  Zoe Santana /Age/Sex: 1955  (60 y.o. female)   MRN & CSN:  [id number] & [id number] Admission Date/Time: 2020  3:26 PM   Location:  -A PCP: Kiran Lenz [address] [facility] Day: 3    Hospital Course:   Zoe Santana is a 60 y.o. female who presented with acute right lower abdominal pain, found to have acute pancreatitis with lipase greater than 3000. Significant recent history of cholecystectomy completed on 2020. CT abdomen shows fluid collection around gallbladder. Surgery was consulted from the ED. Patient was admitted to the ICU with HIDA scan completed on . No further intervention planned. Prior to discharge she was started on acid reflux medications and given referral to GI for outpatient follow-up. Concern for hiatal hernia, esophageal strictures, presbyesophagus based on her description. Also started anxiety medications in addition to her antidepressant. Outpatient follow-up with her family doctor for continued management. Would recommend continuing his medications if they are well-tolerated. Assessment and Plan:      Acute pancreatitis secondary to recent cholecystectomy   Chronic HFrEF of 15%. Diagnosed earlier this month. On Entresto, beta-blocker, fluid restriction of 40 to 64 ounces, sodium restriction of 1500 to 2000 mg.   Acute on chronic anxiety. Trazodone and Vistaril added     Nonischemic cardiomyopathy, diagnosed on last left heart cath, 2020.   Outpatient plans for [facility] in December   Chronic irritable bowel disease   Chronic depression    Diet DIET CLEAR LIQUID;   DVT Prophylaxis [] Lovenox, []  Heparin, [] SCDs, []No VTE prophylaxis, patient ambulating   GI Prophylaxis [] PPI, [] H2 Blocker, [] No GI prophylaxis, patient is receiving diet/Tube Feeds   Code Status Full Code   Disposition Patient requires continued admission due to a.m. labs    Discharge home in the morning  Outpatient GI follow-up   MDM [] Low, [x] Moderate,[]  High  Patient's risk as above due to     [] One or more chronic illnesses with mild exacerbation progression    [x] Two or more stable chronic illnesses    [] Undiagnosed new problem with uncertain prognosis    [] Elective major surgery    []Prescription drug management     Subjective:     Pt S&E. Earlier today. She is tearful and expressing clear anxiety about returning home. She states she has been hospitalized more in the last few months than her entire life. She denies abdominal pain. Admits to some reflux with acid taste in her mouth which is been going on for a long time. Denies fevers or chills    10-14 point ROS reviewed negative, unless as noted above    Objective: Intake/Output Summary (Last 24 hours) at 9/9/2020 0833  Last data filed at 9/8/2020 1848  Gross per 24 hour   Intake 905 ml   Output --   Net 905 ml      Vitals:   Vitals:    09/09/20 0602   BP: 110/64   Pulse: 79   Resp: 14   Temp: 97.6 °F (36.4 °C)   SpO2: 95%     Physical Exam:    GEN Awake female, laying in bed in no apparent distress. Appears given age. EYES Pupils are equally round. No scleral discharge  HENT Atraumatic and symmetric head  NECK No apparent thyromegaly  RESP Symmetric chest movement while on room air. CARDIO/VASC Peripheral pulses equal bilaterally and palpable. No peripheral edema. GI Abdomen is not distended. Rectal exam deferred.  Childs catheter is not present. HEME/LYMPH No petechiae or ecchymoses. MSK Spontaneous movement of BL upper extremities  SKIN Normal coloration, warm, dry. NEURO Cranial nerves appear grossly intact  PSYCH Awake, alert. Oriented.   Clearly anxiety    Medications:   Medications:    sodium chloride flush  10 mL Intravenous BID    atorvastatin  20 mg Oral Daily    acyclovir  200 mg Oral BID    buPROPion  300 mg Oral QAM    pantoprazole  40 mg Oral BID AC    metoprolol succinate  25 mg Oral Daily    cetirizine  10 mg Oral Daily  sodium chloride flush  10 mL Intravenous 2 times per day    enoxaparin  40 mg Subcutaneous Daily      Infusions:    sodium chloride 50 mL/hr at 09/07/20 2351     PRN Meds: docusate sodium, 100 mg, BID PRN  ondansetron, 4 mg, TID PRN  sodium chloride flush, 10 mL, PRN  acetaminophen, 650 mg, Q4H PRN  promethazine, 12.5 mg, Q6H PRN    Or  ondansetron, 4 mg, Q6H PRN          Electronically signed by Gretchen Dance, DO on 9/9/2020 at 8:33 AM

## 2020-09-09 NOTE — PROGRESS NOTES
General Surgery  Dr. Robert Jimenez and Brandi Echols, [facility] Day: 3    Chief Complaint on Admission: Acute RLQ abd pain      Subjective:     Hollie Emery is a 60 y.o. female with   Acute pancreatitis s/p cholecystectomy on . HIDA yesterday r/o bile leak. Pt on low fat diet and tolerating well. Patient reports improved abdominal pain as well as acid reflux that hasn't changed much since admission. Tolerating DIET GENERAL;. +ve BM.     ROS:  Review of Systems   Constitutional: Negative for chills and fever. HENT: Negative for ear pain, mouth sores, sore throat and tinnitus. Eyes: Negative for photophobia, redness and itching. Respiratory: Negative for apnea, choking and stridor. Cardiovascular: Negative for chest pain and palpitations. Gastrointestinal: Positive for abdominal pain. Negative for anal bleeding, constipation and rectal pain. Acid reflux   Endocrine: Negative for polydipsia. Genitourinary: Negative for enuresis, flank pain and hematuria. Musculoskeletal: Negative for back pain, joint swelling and myalgias. Skin: Negative for color change and pallor. Allergic/Immunologic: Negative for environmental allergies. Neurological: Negative for syncope and speech difficulty. Psychiatric/Behavioral: Negative for confusion and hallucinations. Allergies  Keflex [cephalexin]          Diagnosis Date    H/O arthritis     2014 Patient reports arthritis in Knee    History of IBS     2014 Patient told she has IBS    Pancreatitis        Objective:     Vitals:    20 1601   BP: 95/60   Pulse: 74   Resp: 16   Temp: 97.7 °F (36.5 °C)   SpO2: 100%       TEMPERATURE:  Current - Temp: 97.7 °F (36.5 °C); Max - Temp  Av.7 °F (36.5 °C)  Min: 97.5 °F (36.4 °C)  Max: 98.1 °F (36.7 °C)    I/O this shift:  In: 1061 [I.V.:1061]  Out: - I/O last 3 completed shifts: In: 2334 [P.O.:420;  I.V.:845]  Out: -       Physical Exam:  Physical Exam  Constitutional: Appearance: She is well-developed. HENT:      Head: Normocephalic. Eyes:      Pupils: Pupils are equal, round, and reactive to light. Neck:      Musculoskeletal: Normal range of motion and neck supple. Cardiovascular:      Rate and Rhythm: Normal rate. Pulmonary:      Effort: Pulmonary effort is normal.   Abdominal:      General: There is no distension. Palpations: Abdomen is soft. There is no mass. Tenderness: There is no abdominal tenderness (Mild to RUQ and epigastric region). There is no guarding or rebound. Musculoskeletal: Normal range of motion. Skin:     General: Skin is warm. Neurological:      Mental Status: She is alert and oriented to person, place, and time.          Scheduled Meds:   lisinopril  5 mg Oral Daily    traZODone  50 mg Oral Nightly    sertraline  50 mg Oral Daily    sodium chloride flush  10 mL Intravenous BID    atorvastatin  20 mg Oral Daily    acyclovir  200 mg Oral BID    buPROPion  300 mg Oral QAM    pantoprazole  40 mg Oral BID AC    metoprolol succinate  25 mg Oral Daily    cetirizine  10 mg Oral Daily    sodium chloride flush  10 mL Intravenous 2 times per day    enoxaparin  40 mg Subcutaneous Daily     Continuous Infusions:  PRN Meds:hydrOXYzine, docusate sodium, ondansetron, sodium chloride flush, acetaminophen, promethazine **OR** ondansetron      Labs/Imaging Results:   Lab Results   Component Value Date    WBC 3.3 (L) 09/09/2020    HGB 10.1 (L) 09/09/2020    HCT 32.8 (L) 09/09/2020    MCV 91.1 09/09/2020     09/09/2020     Lab Results   Component Value Date     09/09/2020    K 3.3 (L) 09/09/2020     (H) 09/09/2020    CO2 22 09/09/2020    BUN 11 09/09/2020    CREATININE 0.9 09/09/2020    GLUCOSE 74 09/09/2020    CALCIUM 7.2 (L) 09/09/2020    PROT 4.6 (L) 09/09/2020    LABALBU 2.9 (L) 09/09/2020    BILITOT 0.4 09/09/2020    ALKPHOS 164 (H) 09/09/2020    AST 84 (H) 09/09/2020    ALT 74 (H) 09/09/2020    LABGLOM >60 09/09/2020 GFRAA >60 09/09/2020       Assessment:     Patient Active Problem List:     Acute pancreatitis due to calculus of common bile duct     Acute respiratory failure with hypoxia (HCC)     Hypoxia     Left ventricular systolic dysfunction     Chronic systolic congestive heart failure (HCC)     Non-ischemic cardiomyopathy (HCC)     Acute on chronic pancreatitis (HCC)     Nonischemic cardiomyopathy (HCC)  Acid reflux    Plan:     LFTs improved. Pain improved. PT seems to be tolerating her diet so far (This was her first meal on low fat diet)    No bile leak on HIDA. No need for surgical intervention. Pt would benefit from f/u with Gi as an outpt. She is established with Dr. Remi Suarez. Will s/o today. Recommended to keep her scheduled post-op appt on 9/16 with Dr. Kalpesh Lindsey.      Karolyn Mendez PA-C

## 2020-09-09 NOTE — PROGRESS NOTES
INPATIENT CARDIOLOGY PROGRESS NOTE       Reason for consultation: Acute pancreatitis, history of heart failure with reduced ejection fraction    Referring physician:  Mamie Galeazzi, MD     Primary care physician: ALBERTO Dalton - ALANA      Dear Mamie Galeazzi, MD Thank you for the consult    Chief Complaint   Patient presents with    Abdominal Pain     RUQ    Flank Pain    Back Pain       C/o:   No acute events overnight  Patient is tired to take oral liquids. No chest pain shortness of breath or palpitations     has a past medical history of H/O arthritis, History of IBS, and Pancreatitis. Impression:    Acute pancreatitis  Nonischemic cardiomyopathy  Heart failure with reduced ejection fraction, NYHA II symptoms  Hypertension  Hyperlipidemia      Recommendations:    1. Acute pancreatitis, recent cholecystectomy. Patient is on IV fluids   2. Severe nonischemic cardiomyopathy: Patient is euvolemic, currently on low rate IV fluids secondary to dehydration and pancreatitis. Start Lisinopril as renal function has normalized. Continue with Toprol-XL. Will not resume Entresto as patient blood pressure is borderline  3. Heart failure with reduced ejection fraction NYHA class II symptoms. Hold off Lasix and continue with gentle IV hydration. Once able to tolerate PO, consider stopping IVF and resuming Lasix at lower dose of 20 mg daily  4. Hypertension: Start lisinopril (after washout period) and continue with Toprol-XL, blood pressure is borderline and acceptable. Will consider restarting Entresto as outpatient if patient is able to tolerate given borderline blood pressure. 5. Hyperlipidemia continue with statins  6.  Health maintenance: exerise and diet      HPI:     Patient is a 53-year-old female with prior medical history significant for severe nonischemic cardiomyopathy with LV ejection fraction of 15 to 20% diagnosed on recent heart cardiac catheterization end of August 2020, pulmonary hypertension, systolic congestive heart failure NYHA class II-III symptoms, hyperlipidemia, presents to the hospital overnight with chief complaint of abdominal pain, flank pain and back pain. Abdominal pain was periumbilical radiating to the back rated a 7 out of 10 felt like sharp pain associated with nausea and dry heaving with nonbilious emesis and belching. Patient denies any fever chills rigors. Patient denies any chest pain denies any shortness of breath or palpitation denies any pedal edema. Patient denies any PND or orthopnea. On arrival to the hospital patient is diagnosed with acute pancreatitis and is being treated for it. Cardiology is consulted today to evaluate the patient for recent diagnosis nonischemic severe cardiomyopathy and management medications. Past medical history:    has a past medical history of H/O arthritis, History of IBS, and Pancreatitis. Past surgical history:   has a past surgical history that includes Colonoscopy (9-); Hysterectomy; Cholecystectomy, laparoscopic (N/A, 8/22/2020); and Cardiac catheterization. Social History:   reports that she has never smoked. She has never used smokeless tobacco. She reports previous alcohol use. She reports previous drug use. Drug: Marijuana.   Family history:   no family history of CAD, STROKE of DM    Allergies   Allergen Reactions    Keflex [Cephalexin]        sodium chloride flush 0.9 % injection 10 mL, BID  atorvastatin (LIPITOR) tablet 20 mg, Daily  acyclovir (ZOVIRAX) capsule 200 mg, BID  buPROPion (WELLBUTRIN XL) extended release tablet 300 mg, QAM  docusate sodium (COLACE) capsule 100 mg, BID PRN  pantoprazole (PROTONIX) tablet 40 mg, BID AC  ondansetron (ZOFRAN-ODT) disintegrating tablet 4 mg, TID PRN  metoprolol succinate (TOPROL XL) extended release tablet 25 mg, Daily  cetirizine (ZYRTEC) tablet 10 mg, Daily  sodium chloride flush 0.9 % injection 10 mL, 2 times per day  sodium chloride flush 0.9 % injection 10 mL, PRN  acetaminophen (TYLENOL) tablet 650 mg, Q4H PRN  promethazine (PHENERGAN) tablet 12.5 mg, Q6H PRN    Or  ondansetron (ZOFRAN) injection 4 mg, Q6H PRN  enoxaparin (LOVENOX) injection 40 mg, Daily  0.9 % sodium chloride infusion, Continuous      Current Facility-Administered Medications   Medication Dose Route Frequency Provider Last Rate Last Dose    sodium chloride flush 0.9 % injection 10 mL  10 mL Intravenous BID RICARDA Keene   10 mL at 09/08/20 4649    atorvastatin (LIPITOR) tablet 20 mg  20 mg Oral Daily [place] Gess, APRN - CNP   20 mg at 09/09/20 0859    acyclovir (ZOVIRAX) capsule 200 mg  200 mg Oral BID [place] Gess, APRN - CNP   200 mg at 09/09/20 0859    buPROPion (WELLBUTRIN XL) extended release tablet 300 mg  300 mg Oral QAM [facility], APRN - CNP   300 mg at 09/09/20 0859    docusate sodium (COLACE) capsule 100 mg  100 mg Oral BID PRN [place] Gess, APRN - CNP        pantoprazole (PROTONIX) tablet 40 mg  40 mg Oral BID AC [place] Gess, APRN - CNP   40 mg at 09/09/20 0859    ondansetron (ZOFRAN-ODT) disintegrating tablet 4 mg  4 mg Oral TID PRN [place] Gess, APRN - CNP        metoprolol succinate (TOPROL XL) extended release tablet 25 mg  25 mg Oral Daily [place] Gess, APRN - CNP   25 mg at 09/09/20 0859    cetirizine (ZYRTEC) tablet 10 mg  10 mg Oral Daily [place] Gess, APRN - CNP   10 mg at 09/09/20 0859    sodium chloride flush 0.9 % injection 10 mL  10 mL Intravenous 2 times per day [place] Gess, APRN - CNP   10 mL at 09/09/20 0903    sodium chloride flush 0.9 % injection 10 mL  10 mL Intravenous PRN [place] Gess, APRN - CNP        acetaminophen (TYLENOL) tablet 650 mg  650 mg Oral Q4H PRN [place] Gess, APRN - CNP   650 mg at 09/09/20 0910    promethazine (PHENERGAN) tablet 12.5 mg  12.5 mg Oral Q6H PRN [place] Gess, APRN - CNP        Or    ondansetron (ZOFRAN) injection 4 mg  4 mg Intravenous Q6H PRN Maylin Gess, APRN - CNP        enoxaparin (LOVENOX) injection 40 mg  40 mg Subcutaneous Daily Silver Eveniftikhar, APRN - CNP   Stopped at 09/08/20 0935    0.9 % sodium chloride infusion   Intravenous Continuous Silver Evener, APRN - CNP 50 mL/hr at 09/07/20 235           Review of Systems:     · Constitutional: No Fever or Weight Loss   · Eyes: No Decreased Vision  · ENT: No Headaches, Hearing Loss or Vertigo  · Cardiovascular: No chest pain, dyspnea on exertion, palpitations or loss of consciousness  · Respiratory: No cough or wheezing    · Gastrointestinal: As above   · genitourinary: No dysuria, trouble voiding, or hematuria  · Musculoskeletal:  No gait disturbance, weakness or joint complaints  · Integumentary: No rash or pruritis  · Neurological: No TIA or stroke symptoms  · Psychiatric: No anxiety or depression  · Endocrine: No malaise, fatigue or temperature intolerance  · Hematologic/Lymphatic: No bleeding problems, blood clots or swollen lymph nodes  · Allergic/Immunologic: No nasal congestion or hives    All other systems were reviewed and were negative otherwise. Physical Examination:      Vitals:    09/09/20 1102   BP: 105/63   Pulse: 70   Resp: 20   Temp:    SpO2:       Wt Readings from Last 3 Encounters:   09/09/20 206 lb (93.4 kg)   09/04/20 207 lb 12.8 oz (94.3 kg)   08/31/20 222 lb (100.7 kg)     Body mass index is 31.32 kg/m². General Appearance:  No distress, conversant    Constitutional:  Well developed, Well nourished, No acute distress  HENT:  Normocephalic, Atraumatic, Oropharynx moist, No oral exudates, Nose normal.   Neck Supple  Carotid: no carotid bruit  Eyes:  Conjunctiva normal, No discharge. Respiratory:  Normal breath sounds, No respiratory distress, No wheezing, no use of accessory muscles, diaphragm movement is normal  No chest Tenderness  Cardiovascular: S1-S2 No murmurs auscultated. No S3-S4. No rubs, thrills or gallops. Normal rhythm.  pedal pulses are normal. No pedal edema  GI:

## 2020-09-10 VITALS
HEIGHT: 68 IN | WEIGHT: 206 LBS | OXYGEN SATURATION: 98 % | RESPIRATION RATE: 14 BRPM | SYSTOLIC BLOOD PRESSURE: 90 MMHG | BODY MASS INDEX: 31.22 KG/M2 | HEART RATE: 72 BPM | DIASTOLIC BLOOD PRESSURE: 46 MMHG | TEMPERATURE: 98.4 F

## 2020-09-10 PROCEDURE — 6370000000 HC RX 637 (ALT 250 FOR IP): Performed by: STUDENT IN AN ORGANIZED HEALTH CARE EDUCATION/TRAINING PROGRAM

## 2020-09-10 PROCEDURE — 99233 SBSQ HOSP IP/OBS HIGH 50: CPT | Performed by: INTERNAL MEDICINE

## 2020-09-10 PROCEDURE — 6370000000 HC RX 637 (ALT 250 FOR IP): Performed by: NURSE PRACTITIONER

## 2020-09-10 PROCEDURE — 6360000002 HC RX W HCPCS: Performed by: NURSE PRACTITIONER

## 2020-09-10 RX ORDER — SUCRALFATE 1 G/1
1 TABLET ORAL
Qty: 90 TABLET | Refills: 0 | Status: SHIPPED | OUTPATIENT
Start: 2020-09-10

## 2020-09-10 RX ORDER — CALCIUM CARBONATE 200(500)MG
500 TABLET,CHEWABLE ORAL 3 TIMES DAILY PRN
Qty: 30 TABLET | Refills: 0 | Status: SHIPPED | OUTPATIENT
Start: 2020-09-10 | End: 2020-10-10

## 2020-09-10 RX ORDER — TRAZODONE HYDROCHLORIDE 50 MG/1
50 TABLET ORAL NIGHTLY
Qty: 30 TABLET | Refills: 0 | Status: SHIPPED | OUTPATIENT
Start: 2020-09-10

## 2020-09-10 RX ORDER — LISINOPRIL 5 MG/1
5 TABLET ORAL DAILY
Qty: 30 TABLET | Refills: 0 | Status: SHIPPED | OUTPATIENT
Start: 2020-09-11 | End: 2020-10-02 | Stop reason: SDUPTHER

## 2020-09-10 RX ADMIN — PANTOPRAZOLE SODIUM 40 MG: 40 TABLET, DELAYED RELEASE ORAL at 08:24

## 2020-09-10 RX ADMIN — ENOXAPARIN SODIUM 40 MG: 40 INJECTION SUBCUTANEOUS at 08:20

## 2020-09-10 RX ADMIN — CETIRIZINE HYDROCHLORIDE 10 MG: 10 TABLET, FILM COATED ORAL at 08:13

## 2020-09-10 RX ADMIN — ATORVASTATIN CALCIUM 20 MG: 20 TABLET, FILM COATED ORAL at 08:12

## 2020-09-10 RX ADMIN — ACYCLOVIR 200 MG: 200 CAPSULE ORAL at 08:12

## 2020-09-10 RX ADMIN — SUCRALFATE 2 G: 1 TABLET ORAL at 08:12

## 2020-09-10 RX ADMIN — BUPROPION HYDROCHLORIDE 300 MG: 150 TABLET, FILM COATED, EXTENDED RELEASE ORAL at 08:12

## 2020-09-10 NOTE — PROGRESS NOTES
INPATIENT CARDIOLOGY PROGRESS NOTE       Reason for consultation: Acute pancreatitis, history of heart failure with reduced ejection fraction    Referring physician:  Jace Contreras MD     Primary care physician: ALBERTO Meyer CNP    C/o:   No acute events overnight  Patient is tired to take oral liquids. No chest pain shortness of breath or palpitations     has a past medical history of H/O arthritis, History of IBS, and Pancreatitis. Impression:    Acute pancreatitis  Nonischemic cardiomyopathy  Heart failure with reduced ejection fraction, NYHA II symptoms  Hypertension  Hyperlipidemia      Recommendations:    1. Acute pancreatitis, recent cholecystectomy. Patient is on IV fluids   2. Severe nonischemic cardiomyopathy: Patient is euvolemic  3. Continue lisinopril, patient could not tolerate Entresto due to hypotension. 4. Heart failure with reduced ejection fraction. Stable. Lasix p.o. 20 mg as needed for lower extremity edema  5. Hypertension: Start lisinopril (after washout period) and continue with Toprol-XL, blood pressure is borderline and acceptable. Will consider restarting Entresto as outpatient if patient is able to tolerate given borderline blood pressure. 6. Hyperlipidemia continue with statins  7. Health maintenance: exerise and diet    Cardiology will sign off please call us with any further questions  thank you      HPI:     Patient is a 70-year-old female with prior medical history significant for severe nonischemic cardiomyopathy with LV ejection fraction of 15 to 20% diagnosed on recent heart cardiac catheterization end of August 2020, pulmonary hypertension, systolic congestive heart failure NYHA class II-III symptoms, hyperlipidemia, presents to the hospital overnight with chief complaint of abdominal pain, flank pain and back pain.   Abdominal pain was periumbilical radiating to the back rated a 7 out of 10 felt like sharp pain associated with nausea and dry heaving with nonbilious emesis and belching. Patient denies any fever chills rigors. Patient denies any chest pain denies any shortness of breath or palpitation denies any pedal edema. Patient denies any PND or orthopnea. On arrival to the hospital patient is diagnosed with acute pancreatitis and is being treated for it. Cardiology is consulted today to evaluate the patient for recent diagnosis nonischemic severe cardiomyopathy and management medications. Past medical history:    has a past medical history of H/O arthritis, History of IBS, and Pancreatitis. Past surgical history:   has a past surgical history that includes Colonoscopy (9-); Hysterectomy; Cholecystectomy, laparoscopic (N/A, 8/22/2020); and Cardiac catheterization. Social History:   reports that she has never smoked. She has never used smokeless tobacco. She reports previous alcohol use. She reports previous drug use. Drug: Marijuana.   Family history:   no family history of CAD, STROKE of DM    Allergies   Allergen Reactions    Keflex [Cephalexin]        lisinopril (PRINIVIL;ZESTRIL) tablet 5 mg, Daily  hydrOXYzine (VISTARIL) capsule 25 mg, TID PRN  traZODone (DESYREL) tablet 50 mg, Nightly  sertraline (ZOLOFT) tablet 50 mg, Daily  calcium carbonate (TUMS) chewable tablet 500 mg, TID PRN  sucralfate (CARAFATE) tablet 2 g, BID  sodium chloride flush 0.9 % injection 10 mL, BID  atorvastatin (LIPITOR) tablet 20 mg, Daily  acyclovir (ZOVIRAX) capsule 200 mg, BID  buPROPion (WELLBUTRIN XL) extended release tablet 300 mg, QAM  docusate sodium (COLACE) capsule 100 mg, BID PRN  pantoprazole (PROTONIX) tablet 40 mg, BID AC  ondansetron (ZOFRAN-ODT) disintegrating tablet 4 mg, TID PRN  metoprolol succinate (TOPROL XL) extended release tablet 25 mg, Daily  cetirizine (ZYRTEC) tablet 10 mg, Daily  sodium chloride flush 0.9 % injection 10 mL, 2 times per day  sodium chloride flush 0.9 % injection 10 mL, PRN  acetaminophen (TYLENOL) tablet 650 mg, Q4H PRN  promethazine (PHENERGAN) tablet 12.5 mg, Q6H PRN    Or  ondansetron (ZOFRAN) injection 4 mg, Q6H PRN  enoxaparin (LOVENOX) injection 40 mg, Daily      Current Facility-Administered Medications   Medication Dose Route Frequency Provider Last Rate Last Dose    lisinopril (PRINIVIL;ZESTRIL) tablet 5 mg  5 mg Oral Daily Igor Vyas MD   5 mg at 09/09/20 1229    hydrOXYzine (VISTARIL) capsule 25 mg  25 mg Oral TID PRN [employer], DO   25 mg at 09/09/20 2018    traZODone (DESYREL) tablet 50 mg  50 mg Oral Nightly Nicci Can, DO   50 mg at 09/09/20 2018    sertraline (ZOLOFT) tablet 50 mg  50 mg Oral Daily Nicci Can, DO   50 mg at 09/09/20 2018    calcium carbonate (TUMS) chewable tablet 500 mg  500 mg Oral TID PRN [employer], DO   500 mg at 09/09/20 2018    sucralfate (CARAFATE) tablet 2 g  2 g Oral BID Nicci Can, DO   2 g at 09/10/20 3979    sodium chloride flush 0.9 % injection 10 mL  10 mL Intravenous BID RICARDA Bello   10 mL at 09/09/20 2020    atorvastatin (LIPITOR) tablet 20 mg  20 mg Oral Daily Nell Reynoso APRN - CNP   20 mg at 09/10/20 8254    acyclovir (ZOVIRAX) capsule 200 mg  200 mg Oral BID Nell Reynoso APRN - CNP   200 mg at 09/10/20 3606    buPROPion (WELLBUTRIN XL) extended release tablet 300 mg  300 mg Oral QAM Nell Reynoso APRN - CNP   300 mg at 09/10/20 9117    docusate sodium (COLACE) capsule 100 mg  100 mg Oral BID PRN Nell Reynoso APRN - CNP        pantoprazole (PROTONIX) tablet 40 mg  40 mg Oral BID AC Nell Reynoso APRN - CNP   40 mg at 09/10/20 0824    ondansetron (ZOFRAN-ODT) disintegrating tablet 4 mg  4 mg Oral TID PRN Nell Reynoso APRN - CNP        metoprolol succinate (TOPROL XL) extended release tablet 25 mg  25 mg Oral Daily ALBERTO Almazan - CNP   25 mg at 09/09/20 0859    cetirizine (ZYRTEC) tablet 10 mg  10 mg Oral Daily ALBERTO Almazan - CNP   10 mg at 09/10/20 0813    sodium chloride flush 0.9 % injection 10 mL  10 mL Intravenous 2 times per day ALBERTO Richards - CNP   10 mL at 09/09/20 2019    sodium chloride flush 0.9 % injection 10 mL  10 mL Intravenous PRN ALBERTO Richards CNP        acetaminophen (TYLENOL) tablet 650 mg  650 mg Oral Q4H PRN Chelly Russell, APRN - CNP   650 mg at 09/09/20 2017    promethazine (PHENERGAN) tablet 12.5 mg  12.5 mg Oral Q6H PRN Chelly Russell, APRN - CNP   12.5 mg at 09/09/20 2018    Or    ondansetron (ZOFRAN) injection 4 mg  4 mg Intravenous Q6H PRN ALBERTO Richards CNP        enoxaparin (LOVENOX) injection 40 mg  40 mg Subcutaneous Daily ALBERTO Richards - CNP   40 mg at 09/10/20 0820         Review of Systems:     · Constitutional: No Fever or Weight Loss   · Eyes: No Decreased Vision  · ENT: No Headaches, Hearing Loss or Vertigo  · Cardiovascular: No chest pain, dyspnea on exertion, palpitations or loss of consciousness  · Respiratory: No cough or wheezing    · Gastrointestinal: As above   · genitourinary: No dysuria, trouble voiding, or hematuria  · Musculoskeletal:  No gait disturbance, weakness or joint complaints  · Integumentary: No rash or pruritis  · Neurological: No TIA or stroke symptoms  · Psychiatric: No anxiety or depression  · Endocrine: No malaise, fatigue or temperature intolerance  · Hematologic/Lymphatic: No bleeding problems, blood clots or swollen lymph nodes  · Allergic/Immunologic: No nasal congestion or hives    All other systems were reviewed and were negative otherwise. Physical Examination:      Vitals:    09/10/20 0202   BP: (!) 90/46   Pulse: 72   Resp: 14   Temp: 98.4 °F (36.9 °C)   SpO2: 98%      Wt Readings from Last 3 Encounters:   09/09/20 206 lb (93.4 kg)   09/04/20 207 lb 12.8 oz (94.3 kg)   08/31/20 222 lb (100.7 kg)     Body mass index is 31.32 kg/m².     General Appearance:  No distress, conversant    Constitutional:  Well developed, Well nourished, No acute distress  HENT: Normocephalic, Atraumatic, Oropharynx moist, No oral exudates, Nose normal.   Neck Supple  Carotid: no carotid bruit  Eyes:  Conjunctiva normal, No discharge. Respiratory:  Normal breath sounds, No respiratory distress, No wheezing, no use of accessory muscles, diaphragm movement is normal  No chest Tenderness  Cardiovascular: S1-S2 No murmurs auscultated. No S3-S4. No rubs, thrills or gallops. Normal rhythm. pedal pulses are normal. No pedal edema  GI: Periumbilical tender, non distended. Bowel sounds sluggish but present   :  No CVA tenderness. Musculoskeletal:   No tenderness, No cyanosis, No clubbing. Good range of motion in all major joints. No tenderness to palpation or major deformities noted. Back- No tenderness. Integument:  Warm, Dry, No erythema, No rash. Lymphatic:  No lymphadenopathy noted. Neurologic:  Alert & oriented x 3  No focal deficits noted. Psychiatric:  Affect normal, Judgment normal, Mood normal.       Lab Review     Recent Labs     09/09/20 0929   WBC 3.3*   HGB 10.1*   HCT 32.8*         Recent Labs     09/09/20 0929      K 3.3*   *   CO2 22   BUN 11   CREATININE 0.9     Recent Labs     09/09/20 0929   AST 84*   ALT 74*   BILITOT 0.4   ALKPHOS 164*     No results for input(s): TROPONINI in the last 72 hours.   No results found for: BNP  Lab Results   Component Value Date    INR 1.13 08/29/2020    PROTIME 13.7 08/29/2020         EKG:Normal sinus rhythm with LVH    Chest Xray: No acute process    ECHO: LVEF 15 to 20%, RVSP 53 mmHg, moderate MR    CATH: Normal coronary anatomy    All labs, images, EKGs were personally reviewed      Dr. Angelic Obrien  9/10/2020 8:40 AM

## 2020-09-10 NOTE — FLOWSHEET NOTE
Patient taken by wheelchair to front door and taken by private vehicle to home. Patient has all personal belongings with her.

## 2020-09-10 NOTE — PROGRESS NOTES
[facility] Liaison spoke with pt and is aware of discharge & will initiate [place]ee [place].

## 2020-09-10 NOTE — PROGRESS NOTES
CLINICAL PHARMACY NOTE: MEDS TO [address] Select Patient?: No  Total # of Prescriptions Filled: 3   The following medications were delivered to the patient:  · Sertraline 50mg  · Lisinopril 5mg  · Trazodone 50mg  Total # of Interventions Completed: 0  Time Spent (min): 30    Additional Documentation:

## 2020-09-10 NOTE — DISCHARGE INSTR - DIET

## 2020-09-10 NOTE — CARE COORDINATION
Discharge noted. Order for [address]. Met with patient at bedside. She is alert and able to participate. She seems to have difficulties grasping [address]- stated that she \"has a friend who is a OT and a nurse\" who can help her. Reinforced HHC and need for choice in provider; explained /reviewed local options. She requested to have CM [address] after explanation. NNE[facility] [address] liaison notified thru PS.  Trevor Al RN

## 2020-09-11 ENCOUNTER — OFFICE VISIT (OUTPATIENT)
Dept: CARDIOLOGY CLINIC | Age: 65
End: 2020-09-11
Payer: MEDICARE

## 2020-09-11 VITALS
DIASTOLIC BLOOD PRESSURE: 64 MMHG | HEART RATE: 74 BPM | WEIGHT: 214.4 LBS | BODY MASS INDEX: 32.49 KG/M2 | SYSTOLIC BLOOD PRESSURE: 100 MMHG | HEIGHT: 68 IN

## 2020-09-11 LAB
EKG ATRIAL RATE: 64 BPM
EKG DIAGNOSIS: NORMAL
EKG P AXIS: 50 DEGREES
EKG P-R INTERVAL: 172 MS
EKG Q-T INTERVAL: 460 MS
EKG QRS DURATION: 90 MS
EKG QTC CALCULATION (BAZETT): 474 MS
EKG R AXIS: -13 DEGREES
EKG T AXIS: 76 DEGREES
EKG VENTRICULAR RATE: 64 BPM

## 2020-09-11 PROCEDURE — 1111F DSCHRG MED/CURRENT MED MERGE: CPT | Performed by: INTERNAL MEDICINE

## 2020-09-11 PROCEDURE — 99214 OFFICE O/P EST MOD 30 MIN: CPT | Performed by: INTERNAL MEDICINE

## 2020-09-11 PROCEDURE — G8417 CALC BMI ABV UP PARAM F/U: HCPCS | Performed by: INTERNAL MEDICINE

## 2020-09-11 PROCEDURE — 3017F COLORECTAL CA SCREEN DOC REV: CPT | Performed by: INTERNAL MEDICINE

## 2020-09-11 PROCEDURE — 1036F TOBACCO NON-USER: CPT | Performed by: INTERNAL MEDICINE

## 2020-09-11 PROCEDURE — G8428 CUR MEDS NOT DOCUMENT: HCPCS | Performed by: INTERNAL MEDICINE

## 2020-09-11 RX ORDER — FUROSEMIDE 20 MG/1
20 TABLET ORAL DAILY
Qty: 90 TABLET | Refills: 3 | Status: SHIPPED | OUTPATIENT
Start: 2020-09-11 | End: 2021-01-20

## 2020-09-11 NOTE — LETTER
Char Stewart  1955  [id number]    Have you had any Chest Pain - No      Have you had any Shortness of Breath - No      Have you had any dizziness - No        Have you had any palpitations - No        Do you have any edema - swelling in Not at this time         Do you have a surgery or procedure scheduled in the near future - No    BE SURE TO GIVE THIS INFORMATION to [facility]    Ask patient if they [facility] if [facility] are not already [facility]    Check to see if we have an E-MAIL on file for the patient    Check medication list thoroughly!!!  BE SURE TO ASK PATIENT IF THEY NEED MEDICATION REFILLS

## 2020-09-11 NOTE — PROGRESS NOTES
Azucena Carter MD        OFFICE  FOLLOWUP NOTE    Chief complaints: patient is here for management of NICM, pancreatitis,IBS  Subjective: patient feels better, no chest pain, no shortness of breath, no dizziness, no palpitations    ED Mercado is a 60 y. o.year old who  has a past medical history of H/O arthritis, History of IBS, and Pancreatitis. and presents for management of NICM, pancreatitis AND IBS which are not well controlled,   She was readmitted to hospital for pancreatitis and renal failure, she was treated with IVF for few days, she had [facility] on 8/31/20 and had normal coronaries, she had NICM. LVEF is 15-20%      Current Outpatient Medications   Medication Sig Dispense Refill    calcium carbonate 1500 (600 Ca) MG TABS tablet Take 1 tablet by mouth daily      calcium carbonate (TUMS) 500 MG chewable tablet Take 1 tablet by mouth 3 times daily as needed for Heartburn 30 tablet 0    sertraline (ZOLOFT) 50 MG tablet Take 1 tablet by mouth daily 30 tablet 0    traZODone (DESYREL) 50 MG tablet Take 1 tablet by mouth nightly 30 tablet 0    lisinopril (PRINIVIL;ZESTRIL) 5 MG tablet Take 1 tablet by mouth daily 30 tablet 0    sucralfate (CARAFATE) 1 GM tablet Take 1 tablet by mouth 3 times daily (before meals) 90 tablet 0    Blood Pressure KIT 1 Units by Does not apply route daily 1 kit 0    pantoprazole (PROTONIX) 40 MG tablet Take 1 tablet by mouth 2 times daily (before meals) 30 tablet 3    furosemide (LASIX) 40 MG tablet Take 1 tablet by mouth daily 60 tablet 3    metoprolol succinate (TOPROL XL) 25 MG extended release tablet Take 1 tablet by mouth daily 30 tablet 3    atorvastatin (LIPITOR) 20 MG tablet Take 1 tablet by mouth daily 30 tablet 3    ondansetron (ZOFRAN) 4 MG tablet Take 1 tablet by mouth 3 times daily as needed for Nausea or Vomiting 15 tablet 0    acyclovir (ZOVIRAX) 800 MG tablet Take 200 mg by mouth 2 times daily      buPROPion (WELLBUTRIN XL) 300 MG extended release tablet Take 300 mg by mouth every morning      loratadine (CLARITIN) 10 MG capsule Take 10 mg by mouth daily      docusate sodium (COLACE) 100 MG capsule Take 100 mg by mouth 2 times daily as needed for Constipation. No current facility-administered medications for this visit. Allergies: Keflex [cephalexin]  Past Medical History:   Diagnosis Date    H/O arthritis     09- Patient reports arthritis in Knee    History of IBS     09- Patient told she has IBS    Pancreatitis      Past Surgical History:   Procedure Laterality Date    CARDIAC CATHETERIZATION      CHOLECYSTECTOMY, LAPAROSCOPIC N/A 8/22/2020    CHOLECYSTECTOMY LAPAROSCOPIC performed by Bridget Lott MD at [address] [place] [address]  9-    HYSTERECTOMY      total     No family history on file. Social History     Tobacco Use    Smoking status: Never Smoker    Smokeless tobacco: Never Used   Substance Use Topics    Alcohol use: Not Currently     Comment: rarely      [unfilled]  Review of Systems:   · Constitutional: No Fever or Weight Loss   · Eyes: No Decreased Vision  · ENT: No Headaches, Hearing Loss or Vertigo  · Cardiovascular: No chest pain, dyspnea on exertion, palpitations or loss of consciousness  · Respiratory: No cough or wheezing    · Gastrointestinal: + abdominal pain, appetite loss, blood in stools, constipation, diarrhea or heartburn  · Genitourinary: No dysuria, trouble voiding, or hematuria  · Musculoskeletal:  No gait disturbance, weakness or joint complaints  · Integumentary: No rash or pruritis  · Neurological: No TIA or stroke symptoms  · Psychiatric: No anxiety or depression  · Endocrine: No malaise, fatigue or temperature intolerance  · Hematologic/Lymphatic: No bleeding problems, blood clots or swollen lymph nodes  · Allergic/Immunologic: No nasal congestion or hives  All systems negative except as marked.    Objective:  /64 (Site: Right Upper Arm, Position: Sitting, Cuff Size: Medium Adult)   Pulse 74   Ht 5' 8\" (1.727 m)   Wt 214 lb 6.4 oz (97.3 kg)   BMI 32.60 kg/m²   Wt Readings from Last 3 Encounters:   09/11/20 214 lb 6.4 oz (97.3 kg)   09/09/20 206 lb (93.4 kg)   09/04/20 207 lb 12.8 oz (94.3 kg)     Body mass index is 32.6 kg/m². GENERAL - Alert, oriented, pleasant, in no apparent distress,normal grooming  HEENT - pupils are reactive to light and accomodation, cornea intact, conjunctive normal color, ears are normal in exam,throat exam in normal, teeth, gum and palate are normal, oral mucosa is normal without any notation of pallor or cyanosis  Neck - Supple. No jugular venous distention noted. No carotid bruits, no apical -carotid delay  Respiratory:  Normal breath sounds, No respiratory distress, No wheezing, No chest tenderness. ,no use of accessory muscles, diaphragm movement is normal  Cardiovascular: (PMI) apex non displaced,no lifts no thrills, no s3,no s4, Normal heart rate, Normal rhythm, No murmurs, No rubs, No gallops. Carotid arteries pulse and amplitude are normal no bruit, no abdominal bruit noted ( normal abdominal aorta ausculation), femoral arteries pulse and amplitude are normal no bruit, pedal pulses are normal  Femoral pulses have normal amplitude, no bruits   Extremities - No cyanosis, clubbing, or significant edema, no varicose veins    Abdomen - No masses, tenderness, or organomegaly, no hepato-splenomegally, no bruits  Musculoskeletal - No significant edema, no kyphosis or scoliosis, no deformity in any extremity noted, muscle strength and tone are normal  Skin: no ulcer,no scar,no stasis dermatitis   Neurologic - alert oriented times 3,Cranial nerves II through XII are grossly intact. There were no gross focal neurologic abnormalities.  All sensory and motor nerves examined and were normal  Psychiatric: normal mood and affect    No results found for: CKTOTAL, CKMB, CKMBINDEX, TROPONINI  BNP:  No results found for: BNP  PT/INR:  No results found for: PTINR  Lab Results   Component Value Date    LABA1C 5.0 07/29/2012     Lab Results   Component Value Date    CHOL 173 07/29/2012    TRIG 75 09/07/2020    HDL 69 07/29/2012    LDLDIRECT 102 (H) 07/29/2012     Lab Results   Component Value Date    ALT 74 (H) 09/09/2020    AST 84 (H) 09/09/2020     TSH:  No results found for: TSH    Impression:  Ellie Bailey is a 60 y. o.year old who  has a past medical history of H/O arthritis, History of IBS, and Pancreatitis. and presents with     Plan:  1. NICM\" unfortunate case, she is not able to tolerate lasix due to dehydration from IBS and pancreatitis, will try low dose lasix, BB AND lisinopril and hope her LVEF improves, otherwise, she will need ICD  2. Pancreatitis: resolved  3. IBS : AS PER GI  4. H/O cholescystectomy: stable  5. Health maintenance: exerise and diet  All labs, medications and tests reviewed, continue all other medications of all above medical condition listed as is.     [unfilled]

## 2020-09-15 NOTE — DISCHARGE SUMMARY
Discharge Summary    Name:  Osmar Cuevas /Age/Sex: 1955  (60 y.o. female)   MRN & CSN:  [id number] & [id number] Admission Date/Time: 2020  3:26 PM   Attending:  No att. providers found Discharging Physician: Asaf Davis Course:   Osmar Cuevas is a 60 y.o. female who presented with acute right lower abdominal pain, found to have acute pancreatitis with lipase greater than 3000. Significant recent history of cholecystectomy completed on 2020. CT abdomen shows fluid collection around gallbladder. Surgery was consulted from the ED. Patient was admitted to the ICU with HIDA scan completed on . No further intervention planned. Prior to discharge she was started on acid reflux medications and given referral to GI for outpatient follow-up. Concern for hiatal hernia, esophageal strictures, presbyesophagus based on her description. Also started anxiety medications in addition to her antidepressant. Outpatient follow-up with her family doctor for continued management. Would recommend continuing his medications if they are well-tolerated.     Assessment and Plan:      · Acute pancreatitis secondary to recent cholecystectomy  · Chronic HFrEF of 15%. Diagnosed earlier this month. On Entresto, beta-blocker, fluid restriction of 40 to 64 ounces, sodium restriction of 1500 to 2000 mg.  · Acute on chronic anxiety. Trazodone and Vistaril added     · Nonischemic cardiomyopathy, diagnosed on last left heart cath, 2020. Outpatient plans for [facility] in December  · Chronic irritable bowel disease  · Chronic depression    The patient expressed appropriate understanding of and agreement with the discharge recommendations, medications, and plan.      Consults this admission:  IP CONSULT TO HOSPITALIST  IP CONSULT TO GENERAL SURGERY  IP CONSULT TO CARDIOLOGY  IP CONSULT TO HOME CARE NEEDS    Discharge Instruction:   Follow up appointments: cardiology  Primary care physician: within 2 weeks    Diet:  General/cardiac/ADA/as tolerated  Activity: {discharge activity: as tolerated  Disposition: Discharged to:   [x]Home, []HHC, []SNF, []Acute Rehab, []Hospice   Condition on discharge: Stable    Discharge Medications:      Childs, Murali [facility] Medication Instructions [id number]    Printed on:09/14/20 2120   Medication Information                      acyclovir (ZOVIRAX) 800 MG tablet  Take 200 mg by mouth 2 times daily             atorvastatin (LIPITOR) 20 MG tablet  Take 1 tablet by mouth daily             Blood Pressure KIT  1 Units by Does not apply route daily             buPROPion (WELLBUTRIN XL) 300 MG extended release tablet  Take 300 mg by mouth every morning             calcium carbonate (TUMS) 500 MG chewable tablet  Take 1 tablet by mouth 3 times daily as needed for Heartburn             docusate sodium (COLACE) 100 MG capsule  Take 100 mg by mouth 2 times daily as needed for Constipation.              lisinopril (PRINIVIL;ZESTRIL) 5 MG tablet  Take 1 tablet by mouth daily             loratadine (CLARITIN) 10 MG capsule  Take 10 mg by mouth daily             metoprolol succinate (TOPROL XL) 25 MG extended release tablet  Take 1 tablet by mouth daily             ondansetron (ZOFRAN) 4 MG tablet  Take 1 tablet by mouth 3 times daily as needed for Nausea or Vomiting             pantoprazole (PROTONIX) 40 MG tablet  Take 1 tablet by mouth 2 times daily (before meals)             sertraline (ZOLOFT) 50 MG tablet  Take 1 tablet by mouth daily             sucralfate (CARAFATE) 1 GM tablet  Take 1 tablet by mouth 3 times daily (before meals)             traZODone (DESYREL) 50 MG tablet  Take 1 tablet by mouth nightly                 Objective Findings at Discharge:   BP (!) 90/46   Pulse 72   Temp 98.4 °F (36.9 °C)   Resp 14   Ht 5' 8\" (1.727 m)   Wt 206 lb (93.4 kg)   SpO2 98% Comment: spot check  BMI 31.32 kg/m²            PHYSICAL EXAM   GEN Awake female, laying in bed in

## 2020-09-16 ENCOUNTER — OFFICE VISIT (OUTPATIENT)
Dept: SURGERY | Age: 65
End: 2020-09-16

## 2020-09-16 VITALS
TEMPERATURE: 98.3 F | HEART RATE: 79 BPM | OXYGEN SATURATION: 98 % | HEIGHT: 68 IN | SYSTOLIC BLOOD PRESSURE: 102 MMHG | WEIGHT: 208.8 LBS | BODY MASS INDEX: 31.64 KG/M2 | DIASTOLIC BLOOD PRESSURE: 62 MMHG

## 2020-09-16 PROCEDURE — 99024 POSTOP FOLLOW-UP VISIT: CPT | Performed by: SURGERY

## 2020-09-16 NOTE — PROGRESS NOTES
Post-Operative Clinic Note    Chief Complaint   Patient presents with    Post-Op Check     PO [place] 8/22/2020         SUBJECTIVE:  Patient is here today for a second post-operative visit. Patient is s/p laparoscopic cholecystectomy on 8/22/2020 followed by readmission for heart failure then again for recurrent pancreatitis last week. She reports doing ok today. She feels weak. Abdominal pain is much improved. She is tolerating a diet. Denies jaundice. Having bowel movements. She does report some ongoing reflux symptoms. Denies other complaints. Past Surgical History:   Procedure Laterality Date    CARDIAC CATHETERIZATION      CHOLECYSTECTOMY, LAPAROSCOPIC N/A 8/22/2020    CHOLECYSTECTOMY LAPAROSCOPIC performed by Peter Healy MD at [address] [place] [address] Rd  9-    HYSTERECTOMY      total     Past Medical History:   Diagnosis Date    H/O arthritis     09- Patient reports arthritis in Knee    History of IBS     09- Patient told she has IBS    Pancreatitis      No family history on file.   Social History     Socioeconomic History    Marital status: Single     Spouse name: Not on file    Number of children: Not on file    Years of education: Not on file    Highest education level: Not on file   Occupational History    Not on file   Social Needs    Financial resource strain: Not on file    Food insecurity     Worry: Not on file     Inability: Not on file    Transportation needs     Medical: Not on file     Non-medical: Not on file   Tobacco Use    Smoking status: Never Smoker    Smokeless tobacco: Never Used   Substance and Sexual Activity    Alcohol use: Not Currently     Comment: rarely    Drug use: Not Currently     Types: Marijuana    Sexual activity: Not on file   Lifestyle    Physical activity     Days per week: Not on file     Minutes per session: Not on file    Stress: Not on file   Relationships    Social connections     Talks on phone: Not on file     Gets together: Not on file     Attends Muslim service: Not on file     Active member of club or organization: Not on file     Attends meetings of clubs or organizations: Not on file     Relationship status: Not on file    Intimate partner violence     Fear of current or ex partner: Not on file     Emotionally abused: Not on file     Physically abused: Not on file     Forced sexual activity: Not on file   Other Topics Concern    Not on file   Social History Narrative    Not on file       OBJECTIVE:  Physical Exam  General: awake, alert, in no acute distress  HEENT: mucous membranes moist  Respiratory: normal effort, no wheezes appreciated  CV: appears well perfused  Abdomen: Soft, nontender, non-distended. Port site incisions well healed. Skin: warm and dry  Extremities: atraumatic  Neuro: no focal deficits noted  Psych: mood normal        Pathology report reveals:   Final Pathologic Diagnosis:   Gallbladder; cholecystectomy:   -     Acute and chronic cholecystitis. -     Cholesterolosis. -     Cholelithiasis.       ASSESSMENT:  60 y.o. female s/p laparoscopic cholecystectomy for gallstone pancreatitis. She did have readmission for newly diagnosed heart failure post op then another bout of pancreatitis. She brought up the question of whether she could have had a retained gallstone that caused the second bout of pancreatitis. This could certainly have happened but her LFTs returned to normal prior to hospital discharge. I do not feel that further workup at the moment is necessary. She is planning on seeing GI in consultation regarding her ongoing heartburn symptoms. I would be interested in having GI comment on their thoughts regarding her recurrent pancreatitis as well. Doing well from a surgical standpoint.     PLAN:  Agree with GI follow up regarding reflux symptoms and recurrent pancreatitis  Follow up as needed if problems arise      Juan Morales MD  9/16/2020  5:16 PM

## 2020-09-18 ENCOUNTER — NURSE ONLY (OUTPATIENT)
Dept: CARDIOLOGY CLINIC | Age: 65
End: 2020-09-18

## 2020-09-18 VITALS
WEIGHT: 208.6 LBS | SYSTOLIC BLOOD PRESSURE: 120 MMHG | BODY MASS INDEX: 31.72 KG/M2 | HEART RATE: 72 BPM | DIASTOLIC BLOOD PRESSURE: 64 MMHG

## 2020-09-18 RX ORDER — METOPROLOL SUCCINATE 50 MG/1
50 TABLET, EXTENDED RELEASE ORAL DAILY
Qty: 30 TABLET | Refills: 0
Start: 2020-09-18 | End: 2020-10-02 | Stop reason: SDUPTHER

## 2020-09-18 NOTE — PROGRESS NOTES
Patient here for BP and HR check  /64  HR 72  Will increase toprol xl to 50 mg daily  Patient to return in 2 weeks for BP and HR check

## 2020-10-01 ENCOUNTER — TELEPHONE (OUTPATIENT)
Dept: CARDIOLOGY CLINIC | Age: 65
End: 2020-10-01

## 2020-10-02 ENCOUNTER — NURSE ONLY (OUTPATIENT)
Dept: CARDIOLOGY CLINIC | Age: 65
End: 2020-10-02
Payer: MEDICARE

## 2020-10-02 VITALS
BODY MASS INDEX: 31.19 KG/M2 | SYSTOLIC BLOOD PRESSURE: 130 MMHG | HEIGHT: 68 IN | WEIGHT: 205.8 LBS | DIASTOLIC BLOOD PRESSURE: 76 MMHG | RESPIRATION RATE: 16 BRPM | HEART RATE: 72 BPM

## 2020-10-02 PROCEDURE — 99211 OFF/OP EST MAY X REQ PHY/QHP: CPT | Performed by: NURSE PRACTITIONER

## 2020-10-02 RX ORDER — METOPROLOL SUCCINATE 50 MG/1
50 TABLET, EXTENDED RELEASE ORAL DAILY
Qty: 30 TABLET | Refills: 3 | Status: SHIPPED | OUTPATIENT
Start: 2020-10-02 | End: 2021-06-14

## 2020-10-02 RX ORDER — LISINOPRIL 10 MG/1
10 TABLET ORAL DAILY
Qty: 30 TABLET | Refills: 3 | Status: SHIPPED | OUTPATIENT
Start: 2020-10-02 | End: 2021-09-14

## 2020-10-02 NOTE — PROGRESS NOTES
[facility] PHYSICAL REHABILITATION [place]  [address] K[address], [address]  Phone: [phone]    Fax [phone]                  Tom Ma MD, Chani Patel MD, [address] [facility], MD, Merlin Espy, MD Delroy Sides, MD Angelita Old, MD Nas Knott, ALBERTO Willis, ALBERTO Lubin, ALBERTO Felix, ALBERTO      Swathi Kamara is here for a 2 week BP check  Last visit her toprol was increased to 50 mg daily for ICM  Will increase lisinopril to 10 mg daily   Check BMP in 1 week     BP Readings from Last 3 Encounters:   10/02/20 130/76   09/18/20 120/64   09/16/20 102/62     Pulse Readings from Last 3 Encounters:   10/02/20 72   09/18/20 72   09/16/20 79     Wt Readings from Last 3 Encounters:   10/02/20 205 lb 12.8 oz (93.4 kg)   09/18/20 208 lb 9.6 oz (94.6 kg)   09/16/20 208 lb 12.8 oz (94.7 kg)           Plan:   Swathi Kamara is to follow up bp check and OV as scheduled

## 2020-10-09 NOTE — PROGRESS NOTES
Physician Progress Note      PATIENTGwyn Goel  CSN #:                  [id number]  :                       1955  ADMIT DATE:       2020 3:26 PM  [address] DATE:        9/10/2020 11:41 AM  RESPONDING  PROVIDER #:        Lorne HAQ DO          QUERY TEXT:    Dear Dr Gwyn Goel    Pt admitted with Acute pancreatitis . Pt noted to have Documentation in H&P   and  PN VARGAS/ATN  . If possible, please document in the progress notes and   discharge summary if you are evaluating and/or treating any of the following: The medical record reflects the following:  Risk Factors: VARGAS  Clinical Indicators: \"VARGAS/ATN due to poor PO intake. B/w ivf ,\" Documented in   the PN  VARGAS meeting KDIGO with creatinine 1.6 on admission and decreasing to 0.9 at   discharge and treated with IV fluids  Treatment: IVF. labs    Thank you Eden Dowell RN CDS  Options provided:  -- ATN ruled out after study  -- ATN treated and resolved  -- ATN confirmed after study  -- Other - I will add my own diagnosis  -- Disagree - Not applicable / Not valid  -- Disagree - Clinically unable to determine / Unknown  -- Refer to Clinical Documentation Reviewer    PROVIDER RESPONSE TEXT:    ATN treated and resolved.     Query created by: Jessi Padgett on 10/8/2020 2:08 PM      Electronically signed by:  Argenis Swift DO 10/9/2020 12:56 AM

## 2020-10-16 ENCOUNTER — OFFICE VISIT (OUTPATIENT)
Dept: CARDIOLOGY CLINIC | Age: 65
End: 2020-10-16
Payer: MEDICARE

## 2020-10-16 VITALS
RESPIRATION RATE: 18 BRPM | DIASTOLIC BLOOD PRESSURE: 58 MMHG | SYSTOLIC BLOOD PRESSURE: 108 MMHG | HEIGHT: 68 IN | BODY MASS INDEX: 30.77 KG/M2 | HEART RATE: 68 BPM | WEIGHT: 203 LBS

## 2020-10-16 PROCEDURE — G8427 DOCREV CUR MEDS BY ELIG CLIN: HCPCS | Performed by: INTERNAL MEDICINE

## 2020-10-16 PROCEDURE — 99214 OFFICE O/P EST MOD 30 MIN: CPT | Performed by: INTERNAL MEDICINE

## 2020-10-16 PROCEDURE — 1036F TOBACCO NON-USER: CPT | Performed by: INTERNAL MEDICINE

## 2020-10-16 PROCEDURE — G8484 FLU IMMUNIZE NO ADMIN: HCPCS | Performed by: INTERNAL MEDICINE

## 2020-10-16 PROCEDURE — G8417 CALC BMI ABV UP PARAM F/U: HCPCS | Performed by: INTERNAL MEDICINE

## 2020-10-16 PROCEDURE — 3017F COLORECTAL CA SCREEN DOC REV: CPT | Performed by: INTERNAL MEDICINE

## 2020-10-16 NOTE — PROGRESS NOTES
Angélica Torres MD        OFFICE  FOLLOWUP NOTE    Chief complaints: patient is here for management of NICM, pancreatitis,IBS    Subjective: patient feels better, no chest pain, no shortness of breath, no dizziness, no palpitations    HPI Angelito Garcia is a 60 y. o.year old who  has a past medical history of H/O arthritis, History of IBS, and Pancreatitis. and presents for management of NICM, pancreatitis,IBS which are well controlled    Patient fell twice, she lost almost 40 lbs    Current Outpatient Medications   Medication Sig Dispense Refill    lisinopril (PRINIVIL;ZESTRIL) 10 MG tablet Take 1 tablet by mouth daily 30 tablet 3    metoprolol succinate (TOPROL XL) 50 MG extended release tablet Take 1 tablet by mouth daily 30 tablet 3    calcium carbonate 1500 (600 Ca) MG TABS tablet Take 1 tablet by mouth daily      furosemide (LASIX) 20 MG tablet Take 1 tablet by mouth daily 90 tablet 3    sertraline (ZOLOFT) 50 MG tablet Take 1 tablet by mouth daily 30 tablet 0    traZODone (DESYREL) 50 MG tablet Take 1 tablet by mouth nightly 30 tablet 0    sucralfate (CARAFATE) 1 GM tablet Take 1 tablet by mouth 3 times daily (before meals) 90 tablet 0    Blood Pressure KIT 1 Units by Does not apply route daily 1 kit 0    pantoprazole (PROTONIX) 40 MG tablet Take 1 tablet by mouth 2 times daily (before meals) 30 tablet 3    atorvastatin (LIPITOR) 20 MG tablet Take 1 tablet by mouth daily 30 tablet 3    ondansetron (ZOFRAN) 4 MG tablet Take 1 tablet by mouth 3 times daily as needed for Nausea or Vomiting 15 tablet 0    acyclovir (ZOVIRAX) 800 MG tablet Take 200 mg by mouth 2 times daily      buPROPion (WELLBUTRIN XL) 300 MG extended release tablet Take 300 mg by mouth every morning      loratadine (CLARITIN) 10 MG capsule Take 10 mg by mouth daily      docusate sodium (COLACE) 100 MG capsule Take 100 mg by mouth 2 times daily as needed for Constipation.        No current facility-administered 30.87 kg/m². GENERAL - Alert, oriented, pleasant, in no apparent distress,normal grooming  HEENT - pupils are reactive to light and accomodation, cornea intact, conjunctive normal color, ears are normal in exam,throat exam in normal, teeth, gum and palate are normal, oral mucosa is normal without any notation of pallor or cyanosis  Neck - Supple. No jugular venous distention noted. No carotid bruits, no apical -carotid delay  Respiratory:  Normal breath sounds, No respiratory distress, No wheezing, No chest tenderness. ,no use of accessory muscles, diaphragm movement is normal  Cardiovascular: (PMI) apex non displaced,no lifts no thrills, no s3,no s4, Normal heart rate, Normal rhythm, No murmurs, No rubs, No gallops. Carotid arteries pulse and amplitude are normal no bruit, no abdominal bruit noted ( normal abdominal aorta ausculation), femoral arteries pulse and amplitude are normal no bruit, pedal pulses are normal  Femoral pulses have normal amplitude, no bruits   Extremities - No cyanosis, clubbing, or significant edema, no varicose veins    Abdomen - No masses, tenderness, or organomegaly, no hepato-splenomegally, no bruits  Musculoskeletal mild edema, no kyphosis or scoliosis, no deformity in any extremity noted, muscle strength and tone are normal  Skin: no ulcer,no scar,no stasis dermatitis   Neurologic - alert oriented times 3,Cranial nerves II through XII are grossly intact. There were no gross focal neurologic abnormalities.  All sensory and motor nerves examined and were normal  Psychiatric: normal mood and affect    No results found for: CKTOTAL, CKMB, CKMBINDEX, TROPONINI  BNP:  No results found for: BNP  PT/INR:  No results found for: PTINR  Lab Results   Component Value Date    LABA1C 5.0 07/29/2012     Lab Results   Component Value Date    CHOL 173 07/29/2012    TRIG 75 09/07/2020    HDL 69 07/29/2012    LDLDIRECT 102 (H) 07/29/2012     Lab Results   Component Value Date    ALT 74 (H) 09/09/2020 AST 84 (H) 09/09/2020     TSH:  No results found for: TSH    Impression:  Kate Gallo is a 60 y. o.year old who  has a past medical history of H/O arthritis, History of IBS, and Pancreatitis. and presents with     Plan:  1. NICM\": she lost almost 40 lbs, she is on lasix and also use a lot of water, its unfortunate case, she was not able to tolerate lasix due to dehydration from IBS and pancreatitis, will continue low dose lasix, BB AND lisinopril and hope her LVEF improves, otherwise, she will need ICD, will order [facility] next month,.  2. Pancreatitis: resolved  3. IBS : AS PER GI  1. H/O cholescystectomy: stableHealth maintenance: exerise and diet  All labs, medications and tests reviewed, continue all other medications of all above medical condition listed as is.     [unfilled]

## 2020-10-26 ENCOUNTER — PROCEDURE VISIT (OUTPATIENT)
Dept: CARDIOLOGY CLINIC | Age: 65
End: 2020-10-26
Payer: MEDICARE

## 2020-10-26 LAB
LV EF: 35 %
LVEF MODALITY: NORMAL

## 2020-10-26 PROCEDURE — A9560 TC99M LABELED RBC: HCPCS | Performed by: INTERNAL MEDICINE

## 2020-10-26 PROCEDURE — 78472 GATED HEART PLANAR SINGLE: CPT | Performed by: INTERNAL MEDICINE

## 2020-10-26 NOTE — PROGRESS NOTES
11:43 AM    10/26/20    Site: antecubital right, condition patent and no redness    # of attempts: 1

## 2020-10-27 ENCOUNTER — TELEPHONE (OUTPATIENT)
Dept: CARDIOLOGY CLINIC | Age: 65
End: 2020-10-27

## 2020-10-27 NOTE — TELEPHONE ENCOUNTER
Notified of results and pt will F/U 11/30/20. Summary     Supervising physician Dr. Ban Marie .          Left ventricular ejection fraction of 35 %.           Recommendation     LVEF has improved, continue present care, no need for ICD

## 2020-11-09 ENCOUNTER — TELEPHONE (OUTPATIENT)
Dept: CARDIOLOGY CLINIC | Age: 65
End: 2020-11-09

## 2020-11-09 NOTE — TELEPHONE ENCOUNTER
Brandon Andrade with [facility] called to   Discuss her medications please call patient

## 2020-11-30 ENCOUNTER — OFFICE VISIT (OUTPATIENT)
Dept: CARDIOLOGY CLINIC | Age: 65
End: 2020-11-30
Payer: MEDICARE

## 2020-11-30 VITALS
DIASTOLIC BLOOD PRESSURE: 62 MMHG | HEART RATE: 60 BPM | WEIGHT: 194.2 LBS | SYSTOLIC BLOOD PRESSURE: 100 MMHG | HEIGHT: 68 IN | BODY MASS INDEX: 29.43 KG/M2

## 2020-11-30 PROCEDURE — 1036F TOBACCO NON-USER: CPT | Performed by: INTERNAL MEDICINE

## 2020-11-30 PROCEDURE — 3017F COLORECTAL CA SCREEN DOC REV: CPT | Performed by: INTERNAL MEDICINE

## 2020-11-30 PROCEDURE — G8417 CALC BMI ABV UP PARAM F/U: HCPCS | Performed by: INTERNAL MEDICINE

## 2020-11-30 PROCEDURE — G8427 DOCREV CUR MEDS BY ELIG CLIN: HCPCS | Performed by: INTERNAL MEDICINE

## 2020-11-30 PROCEDURE — 99215 OFFICE O/P EST HI 40 MIN: CPT | Performed by: INTERNAL MEDICINE

## 2020-11-30 PROCEDURE — G8484 FLU IMMUNIZE NO ADMIN: HCPCS | Performed by: INTERNAL MEDICINE

## 2020-11-30 RX ORDER — TRAMADOL HYDROCHLORIDE 50 MG/1
TABLET ORAL PRN
COMMUNITY
Start: 2020-11-05

## 2020-11-30 NOTE — LETTER
[facility]  [address] [address]  Phone: [phone]  Fax: [phone]    Daniel Pinzon MD        November 30, 2020     Patient: Mehran March   YOB: 1955   Date of Visit: 11/30/2020       To Whom It May Concern: It is my medical opinion that Derian  should not work until further notice due to weak heart. If you have any questions or concerns, please don't hesitate to call.     Sincerely,        Daniel Pinzon MD

## 2020-11-30 NOTE — PROGRESS NOTES
Miladis Almendarez MD        OFFICE  FOLLOWUP NOTE    Chief complaints: patient is here for management of NICM, pancreatitis,IBS  Subjective: patient feels better, no chest pain, no shortness of breath, no dizziness, no palpitations    HPI Toma Curtis is a 63 y. o.year old who  has a past medical history of H/O arthritis, History of IBS, History of nuclear MUGA test, and Pancreatitis. and presents for management of NICM, pancreatitis,IBSwhich are well controlled  SHE LOST 9   MORE LBS since last time, her MUGA showed LVEF 35%, it was improved from 15-20% ( echo 8/27/20), she will not need ICD,     Current Outpatient Medications   Medication Sig Dispense Refill    lisinopril (PRINIVIL;ZESTRIL) 10 MG tablet Take 1 tablet by mouth daily 30 tablet 3    metoprolol succinate (TOPROL XL) 50 MG extended release tablet Take 1 tablet by mouth daily 30 tablet 3    calcium carbonate 1500 (600 Ca) MG TABS tablet Take 1 tablet by mouth daily      furosemide (LASIX) 20 MG tablet Take 1 tablet by mouth daily 90 tablet 3    sertraline (ZOLOFT) 50 MG tablet Take 1 tablet by mouth daily 30 tablet 0    traZODone (DESYREL) 50 MG tablet Take 1 tablet by mouth nightly 30 tablet 0    sucralfate (CARAFATE) 1 GM tablet Take 1 tablet by mouth 3 times daily (before meals) 90 tablet 0    pantoprazole (PROTONIX) 40 MG tablet Take 1 tablet by mouth 2 times daily (before meals) 30 tablet 3    atorvastatin (LIPITOR) 20 MG tablet Take 1 tablet by mouth daily 30 tablet 3    ondansetron (ZOFRAN) 4 MG tablet Take 1 tablet by mouth 3 times daily as needed for Nausea or Vomiting 15 tablet 0    acyclovir (ZOVIRAX) 800 MG tablet Take 200 mg by mouth 2 times daily      buPROPion (WELLBUTRIN XL) 300 MG extended release tablet Take 300 mg by mouth every morning      loratadine (CLARITIN) 10 MG capsule Take 10 mg by mouth daily      docusate sodium (COLACE) 100 MG capsule Take 100 mg by mouth 2 times daily as needed for Constipation.  traMADol (ULTRAM) 50 MG tablet as needed.  Blood Pressure KIT 1 Units by Does not apply route daily 1 kit 0     No current facility-administered medications for this visit. Allergies: Keflex [cephalexin]  Past Medical History:   Diagnosis Date    H/O arthritis     09- Patient reports arthritis in Knee    History of IBS     09- Patient told she has IBS    History of nuclear MUGA test 10/26/2020    EF 35%.  Pancreatitis      Past Surgical History:   Procedure Laterality Date    CARDIAC CATHETERIZATION      CHOLECYSTECTOMY, LAPAROSCOPIC N/A 8/22/2020    CHOLECYSTECTOMY LAPAROSCOPIC performed by Carlyon Denver, MD at [facility]  9-    HYSTERECTOMY      total     Family History   Problem Relation Age of Onset    Heart Failure Brother      Social History     Tobacco Use    Smoking status: Never Smoker    Smokeless tobacco: Never Used   Substance Use Topics    Alcohol use: Not Currently     Comment: rarely      [unfilled]  Review of Systems:   · Constitutional: No Fever or Weight Loss   · Eyes: No Decreased Vision  · ENT: No Headaches, Hearing Loss or Vertigo  · Cardiovascular: No chest pain, dyspnea on exertion, palpitations or loss of consciousness  · Respiratory: No cough or wheezing    · Gastrointestinal: No abdominal pain, appetite loss, blood in stools, constipation, diarrhea or heartburn  · Genitourinary: No dysuria, trouble voiding, or hematuria  · Musculoskeletal:  No gait disturbance, weakness or joint complaints  · Integumentary: No rash or pruritis  · Neurological: No TIA or stroke symptoms  · Psychiatric: No anxiety or depression  · Endocrine: No malaise, fatigue or temperature intolerance  · Hematologic/Lymphatic: No bleeding problems, blood clots or swollen lymph nodes  · Allergic/Immunologic: No nasal congestion or hives  All systems negative except as marked.    Objective:  /62 (Site: Right Upper Arm, Position: Sitting, Cuff Size: Medium PTINR  Lab Results   Component Value Date    LABA1C 5.0 07/29/2012     Lab Results   Component Value Date    CHOL 173 07/29/2012    TRIG 75 09/07/2020    HDL 69 07/29/2012    LDLDIRECT 102 (H) 07/29/2012     Lab Results   Component Value Date    ALT 74 (H) 09/09/2020    AST 84 (H) 09/09/2020     TSH:  No results found for: TSH    Impression:  Suki Astorga is a 63 y. o.year old who  has a past medical history of H/O arthritis, History of IBS, History of nuclear MUGA test, and Pancreatitis. and presents with     Plan:  1. NICM\": she lost almost 49 lbs, she is on lasix and also use a lot of water, its unfortunate case, she was not able to tolerate lasix due to dehydration from IBS and pancreatitis, will continue low dose lasix, BB AND lisinopril and her LVEF improved,no need for ICD at this time, LVEF  On MUGA is 35%, recommend to stay off work due to [employer] season. 2. Pancreatitis: resolved  3. IBS : AS PER GI  1. H/O cholescystectomy: stableHealth maintenance: exerise and dietHealth maintenance: exerise and diet  All labs, medications and tests reviewed, continue all other medications of all above medical condition listed as is.     [unfilled]

## 2020-11-30 NOTE — LETTER
Eli Joy  1955  [id number]    Have you had any Chest Pain that is not new? - No       Have you had any Shortness of Breath - No     Have you had any dizziness - No, But gets light-headed sometimes, ongoing, occasional, unchanged  If Yes DO ORTHOSTATIC BP - when do you feel dizzy with position change   How long does it last .2  seconds     ? Sitting wait 5 minutes do supine (laying down) wait 5 minutes then do standing - log each in \"vitals\" area in Epic  ? Be sure to ask what symptoms they are having if they get dizzy while completing ortho stats such as room spinning, nausea, etc.    Have you had any palpitations that are not new? - Yes  If Yes DO EKG - Do you feel your heart fluttering  How long does it last - .1  seconds     Is the patient on any of the following medications - NONE  If Yes DO EKG - Needs done every 6 months    Do you have any edema - swelling in ankles    If Yes - CHECK TO SEE IF THE EDEMA IS PITTING  How long have they been having edema - Years  If Yes - Have they worn compression stockings Yes    Vein \"LEG PROBLEM Questionnaire\"  1. Do you have prominent leg veins? No   2. Do you have any skin discoloration? No  3. Do you have any healed or active sores? No  4. Do you have swelling of the legs? Yes x yeaars  5. Do you have a family history of varicose veins? Yes pt's mother  10. Does your profession involve pro-longed        standing or heavy lifting? Yes  X years  7. Have you been fighting overweight problems? No  8. Do you have restless legs? No  9. Do you have any night time cramps? No  10. Do you have any of the following in your legs: NO    Do you have a surgery or procedure scheduled in the near future - No  ? If Yes- DO EKGAsk patient if they want to sign up for [employer] if they are not already signed up    ?  Check to see if we have an E-MAIL on file for the patient ? Check medication list thoroughly!!! AND RECONCILE OUTSIDE MEDICATIONS  If dose has changed change the entire order not just the MG  BE SURE TO ASK PATIENT IF THEY NEED MEDICATION REFILLS

## 2020-12-15 ENCOUNTER — TELEPHONE (OUTPATIENT)
Dept: CARDIOLOGY CLINIC | Age: 65
End: 2020-12-15

## 2020-12-15 NOTE — TELEPHONE ENCOUNTER
Patient at Dr Redd Shah office to get steroid shots in knees. Per Jeff Green NP patient may get injections but will need f/u hfc in 2 weeks.    Spoke to RICARDA Donald - clearance given

## 2021-01-17 ENCOUNTER — HOSPITAL ENCOUNTER (EMERGENCY)
Age: 66
Discharge: HOME | End: 2021-01-17
Attending: EMERGENCY MEDICINE
Payer: MEDICARE

## 2021-01-17 ENCOUNTER — APPOINTMENT (OUTPATIENT)
Dept: GENERAL RADIOLOGY | Age: 66
End: 2021-01-17
Payer: MEDICARE

## 2021-01-17 VITALS
RESPIRATION RATE: 15 BRPM | BODY MASS INDEX: 29.44 KG/M2 | OXYGEN SATURATION: 94 % | HEIGHT: 68 IN | TEMPERATURE: 98.2 F | HEART RATE: 50 BPM | SYSTOLIC BLOOD PRESSURE: 117 MMHG | WEIGHT: 194.22 LBS | DIASTOLIC BLOOD PRESSURE: 77 MMHG

## 2021-01-17 DIAGNOSIS — R63.0 DECREASED APPETITE: ICD-10-CM

## 2021-01-17 DIAGNOSIS — R79.89 ELEVATED SERUM CREATININE: ICD-10-CM

## 2021-01-17 DIAGNOSIS — R00.1 BRADYCARDIA: ICD-10-CM

## 2021-01-17 DIAGNOSIS — R42 LIGHTHEADEDNESS: Primary | ICD-10-CM

## 2021-01-17 LAB
ALBUMIN SERPL-MCNC: 3.8 GM/DL (ref 3.4–5)
ALP BLD-CCNC: 85 IU/L (ref 40–129)
ALT SERPL-CCNC: 10 U/L (ref 10–40)
ANION GAP SERPL CALCULATED.3IONS-SCNC: 8 MMOL/L (ref 4–16)
AST SERPL-CCNC: 16 IU/L (ref 15–37)
BASOPHILS ABSOLUTE: 0.1 K/CU MM
BASOPHILS RELATIVE PERCENT: 1 % (ref 0–1)
BILIRUB SERPL-MCNC: 0.3 MG/DL (ref 0–1)
BUN BLDV-MCNC: 20 MG/DL (ref 6–23)
CALCIUM SERPL-MCNC: 9.2 MG/DL (ref 8.3–10.6)
CHLORIDE BLD-SCNC: 97 MMOL/L (ref 99–110)
CO2: 28 MMOL/L (ref 21–32)
CREAT SERPL-MCNC: 1.4 MG/DL (ref 0.6–1.1)
DIFFERENTIAL TYPE: ABNORMAL
EOSINOPHILS ABSOLUTE: 0.2 K/CU MM
EOSINOPHILS RELATIVE PERCENT: 3.1 % (ref 0–3)
GFR AFRICAN AMERICAN: 46 ML/MIN/1.73M2
GFR NON-AFRICAN AMERICAN: 38 ML/MIN/1.73M2
GLUCOSE BLD-MCNC: 99 MG/DL (ref 70–99)
HCT VFR BLD CALC: 37.6 % (ref 37–47)
HEMOGLOBIN: 12.1 GM/DL (ref 12.5–16)
IMMATURE NEUTROPHIL %: 0.2 % (ref 0–0.43)
LIPASE: 31 IU/L (ref 13–60)
LYMPHOCYTES ABSOLUTE: 1.6 K/CU MM
LYMPHOCYTES RELATIVE PERCENT: 33.1 % (ref 24–44)
MCH RBC QN AUTO: 30.3 PG (ref 27–31)
MCHC RBC AUTO-ENTMCNC: 32.2 % (ref 32–36)
MCV RBC AUTO: 94.2 FL (ref 78–100)
MONOCYTES ABSOLUTE: 0.5 K/CU MM
MONOCYTES RELATIVE PERCENT: 9.2 % (ref 0–4)
NUCLEATED RBC %: 0 %
PDW BLD-RTO: 12.7 % (ref 11.7–14.9)
PLATELET # BLD: 154 K/CU MM (ref 140–440)
PMV BLD AUTO: 9.4 FL (ref 7.5–11.1)
POTASSIUM SERPL-SCNC: 4.2 MMOL/L (ref 3.5–5.1)
PRO-BNP: 345.6 PG/ML
RBC # BLD: 3.99 M/CU MM (ref 4.2–5.4)
SEGMENTED NEUTROPHILS ABSOLUTE COUNT: 2.6 K/CU MM
SEGMENTED NEUTROPHILS RELATIVE PERCENT: 53.4 % (ref 36–66)
SODIUM BLD-SCNC: 133 MMOL/L (ref 135–145)
TOTAL IMMATURE NEUTOROPHIL: 0.01 K/CU MM
TOTAL NUCLEATED RBC: 0 K/CU MM
TOTAL PROTEIN: 6.8 GM/DL (ref 6.4–8.2)
TROPONIN T: <0.01 NG/ML
TROPONIN T: <0.01 NG/ML
WBC # BLD: 4.9 K/CU MM (ref 4–10.5)

## 2021-01-17 PROCEDURE — 84484 ASSAY OF TROPONIN QUANT: CPT

## 2021-01-17 PROCEDURE — 83690 ASSAY OF LIPASE: CPT

## 2021-01-17 PROCEDURE — 80053 COMPREHEN METABOLIC PANEL: CPT

## 2021-01-17 PROCEDURE — U0002 COVID-19 LAB TEST NON-CDC: HCPCS

## 2021-01-17 PROCEDURE — 85025 COMPLETE CBC W/AUTO DIFF WBC: CPT

## 2021-01-17 PROCEDURE — 71045 X-RAY EXAM CHEST 1 VIEW: CPT

## 2021-01-17 PROCEDURE — 2580000003 HC RX 258: Performed by: PHYSICIAN ASSISTANT

## 2021-01-17 PROCEDURE — 99283 EMERGENCY DEPT VISIT LOW MDM: CPT

## 2021-01-17 PROCEDURE — 36415 COLL VENOUS BLD VENIPUNCTURE: CPT

## 2021-01-17 PROCEDURE — 93005 ELECTROCARDIOGRAM TRACING: CPT

## 2021-01-17 PROCEDURE — 83880 ASSAY OF NATRIURETIC PEPTIDE: CPT

## 2021-01-17 RX ORDER — 0.9 % SODIUM CHLORIDE 0.9 %
250 INTRAVENOUS SOLUTION INTRAVENOUS ONCE
Status: COMPLETED | OUTPATIENT
Start: 2021-01-17 | End: 2021-01-17

## 2021-01-17 RX ADMIN — SODIUM CHLORIDE 250 ML: 9 INJECTION, SOLUTION INTRAVENOUS at 16:00

## 2021-01-17 ASSESSMENT — PAIN DESCRIPTION - LOCATION: LOCATION: BACK

## 2021-01-17 ASSESSMENT — PAIN DESCRIPTION - PAIN TYPE: TYPE: CHRONIC PAIN

## 2021-01-17 ASSESSMENT — PAIN SCALES - GENERAL: PAINLEVEL_OUTOF10: 5

## 2021-01-17 NOTE — ED PROVIDER NOTES
I independently examined and evaluated Jatinder Salgado. In brief their history revealed a 63 y.o. female who presents with nausea, decreased appetite since yesterday. Patient states she drank more fluids than she normally would have due to not wanting to eat. Patient states she did eat [place] last night. Patient states she gained 10 pounds since yesterday. Patient denies any lower extremity swelling or pain. Patient does have history of CHF and she takes Lasix. Patient denies pain with urination, does states she urinates frequently due to the Lasix. Patient denies fever. Patient states she did have associated heartburn last night which is since resolved. Patient denies any other chest pain, shortness breath, cough, fever. Patient states she has had some off-and-on lightheadedness with standing over the past week or so. Patient denies extremity weakness or numbness. Patient denies diarrhea. Patient is still able to pass gas. Patient denies vomiting. Patient states she does have mid back pain. Patient denies any injury. Their focused exam revealed alert and oriented female resting in bed no distress normocephalic atraumatic sclera clear lungs clear heart bradycardic regular rhythm 2+ pulses throughout abdomen soft nontender bowel sounds present. 5 5 strength throughout skin has rash slight peripheral edema diffusely due to CHF cranial nerves intact    ED course: Patient seen with PA please see his note. Patient here with not feeling well recently diagnosed with CHF and August.  Is on diuretics, metoprolol. Denies any fevers chest pain shortness of breath had some nausea some lightheadedness upon standing yesterday thinks she stood up too quick. She has been drinking a lot of fluids and possibly increase her salt yesterday.   Patient denies other question concerns on arrival she is stable she is bradycardic but otherwise no complaints vital signs are stable labs otherwise negative we did talk to cardiology outpatient follow-up holding off on changing metoprolol at this time recommended outpatient follow-up and delta troponin which we will do. Patient denies other question concerns stable. Delta troponins negative patient stable discharged home given return precautions and follow-up information stable. All diagnostic, treatment, and disposition decisions were made by myself in conjunction with the Advanced Practice Provider. For all further details of the patient's emergency department visit, please see the Advanced Practice Provider's documentation. 12 lead EKG per my interpretation:  Sinus Bradycardia 53  Axis is   Normal  QTc is  407  There is no specific T wave changes appreciated. There is no specific ST wave changes appreciated.     Prior EKG to compare with was not available            Case Rover, DO  01/17/21 1937

## 2021-01-17 NOTE — ED PROVIDER NOTES
EMERGENCY DEPARTMENT ENCOUNTER      PCP: ALBERTO De Leon - [facility]    CHIEF COMPLAINT    Chief Complaint   Patient presents with    Congestive Heart Failure     10 lb weight gain in 24 hrs     Patient staffed with supervising physician Dr. Eun Anders is a 63 y.o. female who presents with nausea, decreased appetite since yesterday. Patient states she drank more fluids than she normally would have due to not wanting to eat. Patient states she did eat [place] last night. Patient states she gained 10 pounds since yesterday. Patient denies any lower extremity swelling or pain. Patient does have history of CHF and she takes Lasix. Patient denies pain with urination, does states she urinates frequently due to the Lasix. Patient denies fever. Patient states she did have associated heartburn last night which is since resolved. Patient denies any other chest pain, shortness breath, cough, fever. Patient states she has had some off-and-on lightheadedness with standing over the past week or so. Patient denies extremity weakness or numbness. Patient denies diarrhea. Patient is still able to pass gas. Patient denies vomiting. Patient states she does have mid back pain. Patient denies any injury.         REVIEW OF SYSTEMS    Constitutional:  Denies fever  HENT:  Denies sore throat or ear pain   Cardiovascular:  See HPI   Respiratory:  Denies cough or shortness of breath    GI:  See HPI Denies abdominal pain, vomiting, or diarrhea  :  Denies any urinary symptoms  Musculoskeletal: see HPI  Skin:  Denies rash  Neurologic:  Denies headache, focal weakness or sensory changes   Lymphatic:  Denies swollen glands     All other review of systems are negative  See HPI and nursing notes for additional information     PAST MEDICAL AND SURGICAL HISTORY    Past Medical History:   Diagnosis Date    H/O arthritis     09- Patient reports arthritis in Knee    History of IBS     09- Patient told she has IBS    History of nuclear MUGA test 10/26/2020    EF 35%.  Pancreatitis      Past Surgical History:   Procedure Laterality Date    CARDIAC CATHETERIZATION      CHOLECYSTECTOMY, LAPAROSCOPIC N/A 8/22/2020    CHOLECYSTECTOMY LAPAROSCOPIC performed by Radha Morrison [facility] at [facility]  9-    HYSTERECTOMY      total       CURRENT MEDICATIONS    Current Outpatient Rx   Medication Sig Dispense Refill    traMADol (ULTRAM) 50 MG tablet as needed.  lisinopril (PRINIVIL;ZESTRIL) 10 MG tablet Take 1 tablet by mouth daily 30 tablet 3    metoprolol succinate (TOPROL XL) 50 MG extended release tablet Take 1 tablet by mouth daily 30 tablet 3    calcium carbonate 1500 (600 Ca) MG TABS tablet Take 1 tablet by mouth daily      furosemide (LASIX) 20 MG tablet Take 1 tablet by mouth daily 90 tablet 3    sertraline (ZOLOFT) 50 MG tablet Take 1 tablet by mouth daily 30 tablet 0    traZODone (DESYREL) 50 MG tablet Take 1 tablet by mouth nightly 30 tablet 0    sucralfate (CARAFATE) 1 GM tablet Take 1 tablet by mouth 3 times daily (before meals) 90 tablet 0    Blood Pressure KIT 1 Units by Does not apply route daily 1 kit 0    pantoprazole (PROTONIX) 40 MG tablet Take 1 tablet by mouth 2 times daily (before meals) 30 tablet 3    atorvastatin (LIPITOR) 20 MG tablet Take 1 tablet by mouth daily 30 tablet 3    ondansetron (ZOFRAN) 4 MG tablet Take 1 tablet by mouth 3 times daily as needed for Nausea or Vomiting 15 tablet 0    acyclovir (ZOVIRAX) 800 MG tablet Take 200 mg by mouth 2 times daily      buPROPion (WELLBUTRIN XL) 300 MG extended release tablet Take 300 mg by mouth every morning      loratadine (CLARITIN) 10 MG capsule Take 10 mg by mouth daily      docusate sodium (COLACE) 100 MG capsule Take 100 mg by mouth 2 times daily as needed for Constipation.          ALLERGIES    Allergies   Allergen Reactions    Keflex [Cephalexin]        SOCIAL AND FAMILY HISTORY    Social History     Socioeconomic History    Marital status: Single     Spouse name: None    Number of children: None    Years of education: None    Highest education level: None   Occupational History    None   Social Needs    Financial resource strain: None    Food insecurity     Worry: None     Inability: None    Transportation needs     Medical: None     Non-medical: None   Tobacco Use    Smoking status: Never Smoker    Smokeless tobacco: Never Used   Substance and Sexual Activity    Alcohol use: Not Currently     Comment: rarely    Drug use: Not Currently     Types: Marijuana    Sexual activity: None   Lifestyle    Physical activity     Days per week: None     Minutes per session: None    Stress: None   Relationships    Social connections     Talks on phone: None     Gets together: None     Attends Zoroastrianism service: None     Active member of club or organization: None     Attends meetings of clubs or organizations: None     Relationship status: None    Intimate partner violence     Fear of current or ex partner: None     Emotionally abused: None     Physically abused: None     Forced sexual activity: None   Other Topics Concern    None   Social History Narrative    None     Family History   Problem Relation Age of Onset    Heart Failure Brother          PHYSICAL EXAM    VITAL SIGNS: /63   Pulse 52   Temp 98.2 °F (36.8 °C) (Oral)   Resp 13   SpO2 100%    Constitutional:  Well developed, Well nourished  HENT:  Normocephalic, Atraumatic, PERRL. EOMI. oropharynx is clear  Neck/Lymphatics: supple, no JVD, no swollen nodes  Cardiovascular: Bradycardic, Normal rhythm  Respiratory:  Nonlabored breathing. Normal breath sounds, No wheezing  Abdomen: Bowel sounds normal, Soft, No tenderness, no masses. Musculoskeletal: No edema, No tenderness, No cyanosis  Integument:  Warm, Dry  Neurologic:  Alert & oriented , No focal deficits noted. Sensation intact.   Psychiatric:  Affect normal, Mood normal. EKG      EKG Interpretation  Please see ED physician's note for EKG interpretation        RADIOLOGY    XR CHEST PORTABLE   Final Result   No acute findings in the chest.                 ED COURSE & MEDICAL DECISION MAKING      Patient presents as above. Patient is well-appearing, nontoxic. See physician note for EKG reading. Patient denies any current chest pain or shortness of breath. Lungs clear. Chest x-ray shows no acute process. CBC grossly within normal limits. CMP shows mild elevation of creatinine from 0.9-1.4 today. Troponin is negative. BNP is 345. Patient gently hydrated with 250 cc IV fluid bolus. Consult cardiologist Dr. Kai Delgado who is covering for Dr. Faisal Sanabria who agrees with plan for delta troponin and outpatient follow-up, currently does not recommend adjusting or discontinuing metoprolol. Delta troponin is pending at the end of my shift, see supervising physician note for final impression and plan. Clinical  IMPRESSION    1. Lightheadedness    2. Elevated serum creatinine    3. Bradycardia    4. Decreased appetite        See supervising physician note for final impression and plan. Comment: Please note this report has been produced using speech recognition software and may contain errors related to that system including errors in grammar, punctuation, and spelling, as well as words and phrases that may be inappropriate. If there are any questions or concerns please feel free to contact the dictating provider for clarification.             Shelly Rodriguez PA-C  01/17/21 0985

## 2021-01-18 ENCOUNTER — CARE COORDINATION (OUTPATIENT)
Dept: CARE COORDINATION | Age: 66
End: 2021-01-18

## 2021-01-18 LAB
EKG ATRIAL RATE: 53 BPM
EKG DIAGNOSIS: NORMAL
EKG P AXIS: 57 DEGREES
EKG P-R INTERVAL: 178 MS
EKG Q-T INTERVAL: 434 MS
EKG QRS DURATION: 102 MS
EKG QTC CALCULATION (BAZETT): 407 MS
EKG R AXIS: 18 DEGREES
EKG T AXIS: 71 DEGREES
EKG VENTRICULAR RATE: 53 BPM

## 2021-01-18 PROCEDURE — 93010 ELECTROCARDIOGRAM REPORT: CPT | Performed by: INTERNAL MEDICINE

## 2021-01-19 ENCOUNTER — CARE COORDINATION (OUTPATIENT)
Dept: CARE COORDINATION | Age: 66
End: 2021-01-19

## 2021-01-19 LAB
SARS-COV-2: NOT DETECTED
SOURCE: NORMAL

## 2021-01-19 NOTE — CARE COORDINATION
Patient contacted regarding [facility] [place]. Discussed COVID-19 related testing which was pending at this time. Test results were pending. Patient informed of results, if available? No    Care Transition Nurse/ Ambulatory Care Manager contacted the patient by telephone to perform post discharge assessment. Call within 2 business days of discharge: Yes. Verified name and  with patient as identifiers. Provided introduction to self, and explanation of the CTN/ACM role, and reason for call due to risk factors for infection and/or exposure to COVID-19. Symptoms reviewed with patient who verbalized the following symptoms: no new symptoms and no worsening symptoms. Due to no new or worsening symptoms encounter was not routed to provider for escalation. Discussed follow-up appointments. If no appointment was previously scheduled, appointment scheduling offered: [facility] follow up appointment(s):   Future Appointments   Date Time Provider Timoteo Abdul   2021 11:00 AM ALBERTO Dee - CNP [facility]   3/1/2021 10:20 AM Walt Garcia MD [facility]     Non-[facility] follow up appointment(s):     Non-face-to-face services provided:  Obtained and reviewed discharge summary and/or continuity of care documents     Advance Care Planning:   Does patient have an Advance Directive:  decision maker updated. Patient has following risk factors of: heart failure. CTN/ACM reviewed discharge instructions, medical action plan and red flags such as increased shortness of breath, increasing fever and signs of decompensation with patient who verbalized understanding. Discussed exposure protocols and quarantine with CDC Guidelines What to do if you are sick with coronavirus disease .  Patient was given an opportunity for questions and concerns. The patient agrees to contact the [facility] exposure line [phone], local health department  and PCP office for questions related to their healthcare. CTN/ACM provided contact information for future needs. Reviewed and educated patient on any new and changed medications related to discharge diagnosis     Patient/family/caregiver given information for GetWell Loop and agrees to enroll yes  Patient's preferred e-mail:    Patient's preferred phone number:   Based on Loop alert triggers, patient will be contacted by nurse care manager for worsening symptoms. Pt will be further monitored by COVID Loop Team based on severity of symptoms and risk factors. Feels much better. [email or web address]

## 2021-01-20 ENCOUNTER — OFFICE VISIT (OUTPATIENT)
Dept: CARDIOLOGY CLINIC | Age: 66
End: 2021-01-20
Payer: MEDICARE

## 2021-01-20 VITALS
HEART RATE: 60 BPM | BODY MASS INDEX: 27.46 KG/M2 | WEIGHT: 181.2 LBS | SYSTOLIC BLOOD PRESSURE: 100 MMHG | DIASTOLIC BLOOD PRESSURE: 60 MMHG | HEIGHT: 68 IN

## 2021-01-20 DIAGNOSIS — I42.8 NON-ISCHEMIC CARDIOMYOPATHY (HCC): ICD-10-CM

## 2021-01-20 DIAGNOSIS — R42 DIZZINESS: ICD-10-CM

## 2021-01-20 PROCEDURE — G8427 DOCREV CUR MEDS BY ELIG CLIN: HCPCS | Performed by: NURSE PRACTITIONER

## 2021-01-20 PROCEDURE — 1036F TOBACCO NON-USER: CPT | Performed by: NURSE PRACTITIONER

## 2021-01-20 PROCEDURE — 99213 OFFICE O/P EST LOW 20 MIN: CPT | Performed by: NURSE PRACTITIONER

## 2021-01-20 PROCEDURE — G8484 FLU IMMUNIZE NO ADMIN: HCPCS | Performed by: NURSE PRACTITIONER

## 2021-01-20 PROCEDURE — 3017F COLORECTAL CA SCREEN DOC REV: CPT | Performed by: NURSE PRACTITIONER

## 2021-01-20 PROCEDURE — G8417 CALC BMI ABV UP PARAM F/U: HCPCS | Performed by: NURSE PRACTITIONER

## 2021-01-20 RX ORDER — FUROSEMIDE 20 MG/1
TABLET ORAL
Qty: 30 TABLET | Refills: 2 | Status: SHIPPED | OUTPATIENT
Start: 2021-01-20 | End: 2022-10-13 | Stop reason: SDUPTHER

## 2021-01-20 NOTE — ASSESSMENT & PLAN NOTE
Stressed compliance with low sodium diet. ER visit with dizziness found to be dehydrated, will stop Lasix. Patient has been complaint with medications and MUGA in November of 2020 showed improved EF from 10-15% to 35%.

## 2021-01-20 NOTE — ASSESSMENT & PLAN NOTE
Patient recently in the ER with C/O dizziness. Troponin, chest x-ray and BNP were all within normal limits. There was a slight elevation in creatinine indicating dehydration. She received small fluid bolus which resolved symptoms. Patient was also found to have mild bradycardia in 50s but no medications was adjusted. HR WNL today, will change Lasix to PRN for weight gain of 3 lbs in a day or 5 lbs in a week.

## 2021-01-20 NOTE — LETTER
Shruthi Mojica [place]  1955  [id number]    Have you had any Chest Pain that is not new? - No      ? DO EKG IF: Patient has a Heart Rate above 100 or below 40     CAD (Coronary Artery Disease) patient should have one on file every 6 months        Have you had any Shortness of Breath - No      Have you had any dizziness - Yes  If Yes DO ORTHOSTATIC BP - when do you feel dizzy with position change   How long does it last .10  All Day     ? Sitting wait 5 minutes do supine (laying down) wait 5 minutes then do standing - log each in \"vitals\" area in Epic  ? Be sure to ask what symptoms they are having if they get dizzy while completing ortho stats such as room spinning, nausea, etc.    Have you had any palpitations that are not new? - No      Is the patient on any of the following medications -   If Yes DO EKG - Needs done every 6 months    Do you have any edema -no  Do you have a surgery or procedure scheduled in the near future - No    ? Ask patient if they want to [facility] if they are not already signed [facility]    ? Check to see if we have an E-MAIL on file for the patient    ? Check medication list thoroughly!!! AND RECONCILE OUTSIDE MEDICATIONS  If dose has changed change the entire order not just the MG  BE SURE TO ASK PATIENT IF THEY NEED MEDICATION REFILLS    ?  At check out add to every patient's \"wrap up\" the following dot phrase AFTERHOURSEDUCATION and ensure we explain this to our patients

## 2021-01-20 NOTE — PROGRESS NOTES
[facility] ([facility] [place] [facility] [place]  [address], [address] [facility],Third Floor  Phone: [phone]    Fax [phone]                  Edmar Tesfaye MD, Rosita Hernandez MD, [address] Mccann MD, MD Cristina Khan MD Renae Martyr, MD Irwin Ras, ALBERTO Dloan, ALBERTO Cruz, APRN     Lebron Uriarte, APRN        Cardiology Progress Note      1/20/2021    RE: Paulina Guerra  (1955)                             Primary cardiologist: Dr. Radha Porter:   1. Non-ischemic cardiomyopathy ([address]    2. Dizziness        HPI: Paulina Guerra, who is a  63y.o. year old female with a past medical history as listed below. Patient presents to the office for follow up on CAD, HTN, and hyperlipidemia. Patient recently in the emergency department with complaints of lightheadedness, patient found to be slightly dehydrated and leg bradycardic with heart rate in the 50s. Patient is  compliant with medications. Patient denies any chest pain, shortness of breath, dizziness, syncope, or palpitations. Past Medical History:   Diagnosis Date    H/O arthritis     09- Patient reports arthritis in Knee    History of IBS     09- Patient told she has IBS    History of nuclear MUGA test 10/26/2020    EF 35%.  Pancreatitis        Current Outpatient Medications   Medication Sig Dispense Refill    furosemide (LASIX) 20 MG tablet Take one tab for a weight gain of 3 lbs in a day or 5 lbs in a week. 30 tablet 2    traMADol (ULTRAM) 50 MG tablet as needed.       lisinopril (PRINIVIL;ZESTRIL) 10 MG tablet Take 1 tablet by mouth daily 30 tablet 3    metoprolol succinate (TOPROL XL) 50 MG extended release tablet Take 1 tablet by mouth daily 30 tablet 3    calcium carbonate 1500 (600 Ca) MG TABS tablet Take 1 tablet by mouth daily      sertraline (ZOLOFT) 50 MG tablet Take 1 tablet by mouth daily 30 tablet 0    traZODone (DESYREL) 50 MG tablet Take 1 tablet by mouth nightly 30 tablet 0    sucralfate (CARAFATE) 1 GM tablet Take 1 tablet by mouth 3 times daily (before meals) 90 tablet 0    Blood Pressure KIT 1 Units by Does not apply route daily 1 kit 0    pantoprazole (PROTONIX) 40 MG tablet Take 1 tablet by mouth 2 times daily (before meals) 30 tablet 3    atorvastatin (LIPITOR) 20 MG tablet Take 1 tablet by mouth daily 30 tablet 3    ondansetron (ZOFRAN) 4 MG tablet Take 1 tablet by mouth 3 times daily as needed for Nausea or Vomiting 15 tablet 0    acyclovir (ZOVIRAX) 800 MG tablet Take 200 mg by mouth 2 times daily      buPROPion (WELLBUTRIN XL) 300 MG extended release tablet Take 300 mg by mouth every morning      loratadine (CLARITIN) 10 MG capsule Take 10 mg by mouth daily      docusate sodium (COLACE) 100 MG capsule Take 100 mg by mouth 2 times daily as needed for Constipation. No current facility-administered medications for this visit.       Allergies: Keflex [cephalexin]  Past Surgical History:   Procedure Laterality Date    CARDIAC CATHETERIZATION      CHOLECYSTECTOMY, LAPAROSCOPIC N/A 8/22/2020    CHOLECYSTECTOMY LAPAROSCOPIC performed by Ghazala Crystal MD at [facility]  9-    HYSTERECTOMY      total     Family History   Problem Relation Age of Onset    Heart Failure Brother      Social History     Tobacco Use    Smoking status: Never Smoker    Smokeless tobacco: Never Used   Substance Use Topics    Alcohol use: Not Currently     Comment: rarely        Review of Systems - History obtained from the patient  General: negative for - fatigue, malaise, night sweats, weight gain  Psychological: negative for - anxiety, depression, sleep disturbances  Ophthalmic: negative for - blurry vision, loss of vision  ENT: negative for - headaches, vertigo, visual changes  Hematological and Lymphatic: negative for - bleeding problems, blood clots, bruising, fatigue or pallor  Endocrine: negative for - Value Date    LABA1C 5.0 07/29/2012     Lab Results   Component Value Date    CHOL 173 07/29/2012    TRIG 75 09/07/2020    HDL 69 07/29/2012    LDLDIRECT 102 (H) 07/29/2012     Lab Results   Component Value Date    ALT 10 01/17/2021    AST 16 01/17/2021     TSH:  No results found for: TSH    Vitals:    01/20/21 1131   BP: 100/60   Pulse: 60       Echo:8/27/20  Left ventricular systolic function is abnormal.   Ejection fraction is visually estimated at 15-20%. Mild to moderate mitral regurgitation. Mild tricuspid regurgitation; RVSP: 53 mmHg consistent with moderate PHTN. No evidence of any pericardial effusion. Cath:8/28/20    1. Short left main   2. LAD and Circ are widely patent   3. RCA is patent   4. LVEDP was 7      Recommendations   Non ischemic cardiomyopathy   medical management for CHF    The ASCVD Risk score (Javier Roberts., et al., 2013) failed to calculate for the following reasons:    Cannot find a previous HDL lab    Cannot find a previous total cholesterol lab      Assessment/ Plan:     Non-ischemic cardiomyopathy ([address]  Stressed compliance with low sodium diet. ER visit with dizziness found to be dehydrated, will stop Lasix. Patient has been complaint with medications and MUGA in November of 2020 showed improved EF from 10-15% to 35%. Dizziness  Patient recently in the ER with C/O dizziness. Troponin, chest x-ray and BNP were all within normal limits. There was a slight elevation in creatinine indicating dehydration. She received small fluid bolus which resolved symptoms. Patient was also found to have mild bradycardia in 50s but no medications was adjusted. HR WNL today, will change Lasix to PRN for weight gain of 3 lbs in a day or 5 lbs in a week. Patient seen, interviewed and examined. Testing was reviewed. Patient is encouraged to exercise even a brisk walk for 30 minutes at least 3 to 4 times a week. Lifestyle and risk factor modificatons discussed.  Various goals are discussed and questions answered. Continue current medications. Appropriate prescriptions are addressed. Questions answered and patient verbalizes understanding. Call for any problems, questions, or concerns. Pt is to follow up in 3 months for Cardiac management    Electronically signed by Flavio Walters.  ALBERTO Hoffman CNP on 1/20/2021 at 12:28 PM

## 2021-03-01 ENCOUNTER — OFFICE VISIT (OUTPATIENT)
Dept: CARDIOLOGY CLINIC | Age: 66
End: 2021-03-01
Payer: MEDICARE

## 2021-03-01 VITALS
HEIGHT: 68 IN | WEIGHT: 178.8 LBS | HEART RATE: 60 BPM | SYSTOLIC BLOOD PRESSURE: 120 MMHG | DIASTOLIC BLOOD PRESSURE: 68 MMHG | BODY MASS INDEX: 27.1 KG/M2

## 2021-03-01 DIAGNOSIS — M79.89 LEG SWELLING: ICD-10-CM

## 2021-03-01 DIAGNOSIS — I51.9 LV DYSFUNCTION: Primary | ICD-10-CM

## 2021-03-01 PROCEDURE — 1036F TOBACCO NON-USER: CPT | Performed by: INTERNAL MEDICINE

## 2021-03-01 PROCEDURE — G8427 DOCREV CUR MEDS BY ELIG CLIN: HCPCS | Performed by: INTERNAL MEDICINE

## 2021-03-01 PROCEDURE — 99214 OFFICE O/P EST MOD 30 MIN: CPT | Performed by: INTERNAL MEDICINE

## 2021-03-01 PROCEDURE — G8484 FLU IMMUNIZE NO ADMIN: HCPCS | Performed by: INTERNAL MEDICINE

## 2021-03-01 PROCEDURE — 3017F COLORECTAL CA SCREEN DOC REV: CPT | Performed by: INTERNAL MEDICINE

## 2021-03-01 PROCEDURE — G8417 CALC BMI ABV UP PARAM F/U: HCPCS | Performed by: INTERNAL MEDICINE

## 2021-03-01 NOTE — LETTER
Hillary Ace  1955  [id number]    Have you had any Chest Pain that is not new? - No          Have you had any Shortness of Breath - No      Have you had any dizziness - No  ?     Have you had any palpitations that are not new? - No    Is the patient on any of the following medications -   Do you have any edema - swelling in No       Do you have a surgery or procedure scheduled in the near future - No    ? Ask patient if they want to sign up for [facility] if they are not already signed up    ? Check to see if we have an E-MAIL on file for the patient    ? Check medication list thoroughly!!! AND RECONCILE OUTSIDE MEDICATIONS  If dose has changed change the entire order not just the MG  BE SURE TO ASK PATIENT IF THEY NEED MEDICATION REFILLS    ?  At check out add to every patient's \"wrap up\" the following dot phrase AFTERHOURSEDUCATION and ensure we explain this to our patients

## 2021-03-01 NOTE — PROGRESS NOTES
Christina Kay MD        OFFICE  FOLLOWUP NOTE    Chief complaints: patient is here for management of  NICM, pancreatitis,IBS  Subjective: patient feels better, no chest pain, no shortness of breath, no dizziness, no palpitations    Subjective: patient feels better, no chest pain, no shortness of breath, no dizziness, no palpitations    HPI Severo Batty is a 63 y. o.year old who  has a past medical history of H/O arthritis, History of IBS, History of nuclear MUGA test, and Pancreatitis. and presents for management of  NICM, pancreatitis,IBS  Subjective: patient feels better, no chest pain, no shortness of breath, no dizziness, no palpitationswhich are well controlled  She lost 18 more lbs, feels better, her blood pressure is normal    Current Outpatient Medications   Medication Sig Dispense Refill    furosemide (LASIX) 20 MG tablet Take one tab for a weight gain of 3 lbs in a day or 5 lbs in a week. 30 tablet 2    traMADol (ULTRAM) 50 MG tablet as needed.       lisinopril (PRINIVIL;ZESTRIL) 10 MG tablet Take 1 tablet by mouth daily 30 tablet 3    metoprolol succinate (TOPROL XL) 50 MG extended release tablet Take 1 tablet by mouth daily 30 tablet 3    calcium carbonate 1500 (600 Ca) MG TABS tablet Take 1 tablet by mouth daily      sertraline (ZOLOFT) 50 MG tablet Take 1 tablet by mouth daily 30 tablet 0    traZODone (DESYREL) 50 MG tablet Take 1 tablet by mouth nightly 30 tablet 0    sucralfate (CARAFATE) 1 GM tablet Take 1 tablet by mouth 3 times daily (before meals) 90 tablet 0    Blood Pressure KIT 1 Units by Does not apply route daily 1 kit 0    pantoprazole (PROTONIX) 40 MG tablet Take 1 tablet by mouth 2 times daily (before meals) 30 tablet 3    atorvastatin (LIPITOR) 20 MG tablet Take 1 tablet by mouth daily 30 tablet 3    ondansetron (ZOFRAN) 4 MG tablet Take 1 tablet by mouth 3 times daily as needed for Nausea or Vomiting 15 tablet 0 · Hematologic/Lymphatic: No bleeding problems, blood clots or swollen lymph nodes  · Allergic/Immunologic: No nasal congestion or hives  All systems negative except as marked. Objective:  /68   Pulse 60   Ht 5' 8\" (1.727 m)   Wt 178 lb 12.8 oz (81.1 kg)   BMI 27.19 kg/m²   Wt Readings from Last 3 Encounters:   03/01/21 178 lb 12.8 oz (81.1 kg)   01/20/21 181 lb 3.2 oz (82.2 kg)   01/17/21 194 lb 3.6 oz (88.1 kg)     Body mass index is 27.19 kg/m². GENERAL - Alert, oriented, pleasant, in no apparent distress,normal grooming  HEENT  pupils are intact, cornea intact, conjunctive normal color, ears are normal in exam,  Neck - Supple. No jugular venous distention noted. No carotid bruits, no apical -carotid delay  Respiratory:  Normal breath sounds, No respiratory distress, No wheezing, No chest tenderness. ,no use of accessory muscles, diaphragm movement is normal  Cardiovascular: (PMI) apex non displaced,no lifts no thrills, no s3,no s4, Normal heart rate, Normal rhythm, No murmurs, No rubs, No gallops. Carotid arteries pulse and amplitude are normal no bruit, no abdominal bruit noted ( normal abdominal aorta ausculation),   Extremities - No cyanosis, clubbing, or significant edema, no varicose veins    Abdomen  No masses, tenderness, or organomegaly, no hepato-splenomegally, no bruits  Musculoskeletal + edema, no kyphosis or scoliosis, no deformity in any extremity noted, muscle strength and tone are normal  Skin: no ulcer,no scar,+ stasis dermatitis   Neurologic  alert oriented times 3,Cranial nerves II through XII are grossly intact. There were no gross focal neurologic abnormalities.    Psychiatric: normal mood and affect    No results found for: CKTOTAL, CKMB, CKMBINDEX, TROPONINI  BNP:  No results found for: BNP  PT/INR:  No results found for: PTINR  Lab Results   Component Value Date    LABA1C 5.0 07/29/2012     Lab Results   Component Value Date    CHOL 144 01/29/2021    TRIG 106 01/29/2021 HDL 57 01/29/2021    LDLCALC 68 01/29/2021    LDLDIRECT 102 (H) 07/29/2012     Lab Results   Component Value Date    ALT 10 01/17/2021    AST 16 01/17/2021     TSH:  No results found for: TSH    Impression:  Tristin Severino is a 63 y. o.year old who  has a past medical history of H/O arthritis, History of IBS, History of nuclear MUGA test, and Pancreatitis. and presents with     Plan:  1. NICM\": she lost almost 67 lbs, she is on lasix,  will continue low dose lasix, BB AND lisinopril and her LVEF improved,no need for ICD at this time, LVEF  On MUGA is 35%,, will recheck echo, WILL RECOMMEND to avoid alcohol if LV ef is still depressed, she is ok to start work  2. Stasis dermatitis and leg swelling: venous doppler ordered and compression socks recommended  3. Pancreatitis: resolved, RECOMMEND TO AVOID ALCOHOL  4. IBS : AS PER GI  1. H/O cholescystectomy: stable  2. Health maintenance: exerise and dietHealth Health maintenance: exerise and diet  All labs, medications and tests reviewed, continue all other medications of all above medical condition listed as is.     [unfilled]

## 2021-03-04 ENCOUNTER — PROCEDURE VISIT (OUTPATIENT)
Dept: CARDIOLOGY CLINIC | Age: 66
End: 2021-03-04
Payer: MEDICARE

## 2021-03-04 DIAGNOSIS — I51.9 LV DYSFUNCTION: Primary | ICD-10-CM

## 2021-03-04 PROCEDURE — 93308 TTE F-UP OR LMTD: CPT | Performed by: INTERNAL MEDICINE

## 2021-03-08 ENCOUNTER — TELEPHONE (OUTPATIENT)
Dept: CARDIOLOGY CLINIC | Age: 66
End: 2021-03-08

## 2021-03-08 NOTE — TELEPHONE ENCOUNTER
Patient had episode of feeling off balance and started to go down a step and passed out briefly. Patient feels she was dehydrated, no issues since.  Patient declined OV, will call if it happens again

## 2021-03-10 ENCOUNTER — PROCEDURE VISIT (OUTPATIENT)
Dept: CARDIOLOGY CLINIC | Age: 66
End: 2021-03-10
Payer: MEDICARE

## 2021-03-10 DIAGNOSIS — M79.89 LEG SWELLING: ICD-10-CM

## 2021-03-10 PROCEDURE — 93970 EXTREMITY STUDY: CPT | Performed by: INTERNAL MEDICINE

## 2021-03-17 ENCOUNTER — TELEPHONE (OUTPATIENT)
Dept: CARDIOLOGY CLINIC | Age: 66
End: 2021-03-17

## 2021-03-17 DIAGNOSIS — I73.9 PVD (PERIPHERAL VASCULAR DISEASE) (HCC): Primary | ICD-10-CM

## 2021-03-17 NOTE — TELEPHONE ENCOUNTER
Pt called for results, EF has really improved! I will mail RX for compression socks and pharmacy list.            Summary   Left ventricular systolic function is mildly depressed with an ejection   fraction of 40-45%. Grade 1 Diastolic Dysfunction. No regional wall motion abnormalities. Normal pulmonary artery pressure with a RVSP of 27mmHg. No evidence of   pericardial effusion.         Summary        No evidence of DVT or SVT in the bilateral common femoral vein, femoral    vein, popliteal vein, greater saphenous vein or small saphenous vein.    All visualized deep veins from the common femoral through the proximal calf    compress and augment normally.    Significant reflux noted in the Left GSV - SFJ (1.6s).     Significant reflux noted of the Right CFV (1.4 s).        Recommendations        COMPRESSION SOCKS RECOMMENDED

## 2021-05-11 ENCOUNTER — TELEPHONE (OUTPATIENT)
Dept: CARDIOLOGY CLINIC | Age: 66
End: 2021-05-11

## 2021-05-11 NOTE — TELEPHONE ENCOUNTER
Patient called stating that she needs to get clearance to get a steroid shot with Dr. Veneta Brunner, please call her back at ph# [phone].

## 2021-05-26 ENCOUNTER — OFFICE VISIT (OUTPATIENT)
Dept: CARDIOLOGY CLINIC | Age: 66
End: 2021-05-26
Payer: MEDICARE

## 2021-05-26 ENCOUNTER — TELEPHONE (OUTPATIENT)
Dept: CARDIOLOGY CLINIC | Age: 66
End: 2021-05-26

## 2021-05-26 VITALS
HEIGHT: 68 IN | HEART RATE: 61 BPM | DIASTOLIC BLOOD PRESSURE: 62 MMHG | WEIGHT: 173 LBS | SYSTOLIC BLOOD PRESSURE: 120 MMHG | BODY MASS INDEX: 26.22 KG/M2

## 2021-05-26 DIAGNOSIS — I50.22 CHRONIC SYSTOLIC CONGESTIVE HEART FAILURE (HCC): ICD-10-CM

## 2021-05-26 DIAGNOSIS — T14.8XXA BRUISE: ICD-10-CM

## 2021-05-26 DIAGNOSIS — I87.2 VENOUS REFLUX: Primary | ICD-10-CM

## 2021-05-26 PROBLEM — I42.8 NON-ISCHEMIC CARDIOMYOPATHY (HCC): Status: RESOLVED | Noted: 2020-09-04 | Resolved: 2021-05-26

## 2021-05-26 PROCEDURE — 99214 OFFICE O/P EST MOD 30 MIN: CPT | Performed by: NURSE PRACTITIONER

## 2021-05-26 PROCEDURE — 1036F TOBACCO NON-USER: CPT | Performed by: NURSE PRACTITIONER

## 2021-05-26 PROCEDURE — 3017F COLORECTAL CA SCREEN DOC REV: CPT | Performed by: NURSE PRACTITIONER

## 2021-05-26 PROCEDURE — G8427 DOCREV CUR MEDS BY ELIG CLIN: HCPCS | Performed by: NURSE PRACTITIONER

## 2021-05-26 PROCEDURE — G8417 CALC BMI ABV UP PARAM F/U: HCPCS | Performed by: NURSE PRACTITIONER

## 2021-05-26 RX ORDER — PANTOPRAZOLE SODIUM 40 MG/1
40 TABLET, DELAYED RELEASE ORAL DAILY
Qty: 30 TABLET | Refills: 3 | Status: SHIPPED | OUTPATIENT
Start: 2021-05-26 | End: 2022-10-13 | Stop reason: SDUPTHER

## 2021-05-26 NOTE — ASSESSMENT & PLAN NOTE
-Patient reports bruising on left lower calf started approximately 2 weeks ago. She reports it faded and has returned. Patient has multiple varicose veins. Recent ultrasound showed no DVT but had significant reflux. Recommend compression stockings. She does have a history of pancreatitis. Patient reports bleeding easily. She denies any abdominal pain or blood in urine or stool. She does complain of low urine output. We will get BMP, CBC, and hepatic panel.

## 2021-05-26 NOTE — TELEPHONE ENCOUNTER
Patient is requesting to speak with a nurse in regards to her legs. She is noticing bruising more prominent on the left side. Bruise was going away but now is back and very dark purple with smaller bruises around it. Tingling when sitting in both legs said it feels numb but the tingling seems to go away when she stands. Is concerned about the bruising and knows she has not bumped or hit them on anything. Wants to know if this is something she needs seen for in office or ER.  She can be reached at [phone]

## 2021-05-26 NOTE — LETTER
Patty Babinski [facility]  1955  [id number]    Have you had any Chest Pain that is not new? - No      Have you had any Shortness of Breath - Yes  If Yes - When on exertion    Have you had any dizziness - No     Have you had any palpitations that are not new? - No      Is the patient on any of the following medications -     Do you have any edema - swelling in ankles    If Yes - CHECK TO SEE IF THE EDEMA IS PITTING  How long have they been having edema - Years  If Yes - Have they worn compression stockings No  If they have worn compression stockings        Vein \"LEG PROBLEM Questionnaire\"  1. Do you have prominent leg veins? Yes   2. Do you have any skin discoloration? Yes  3. Do you have any healed or active sores? No  4. Do you have swelling of the legs? Yes  5. Do you have a family history of varicose veins? No  6. Does your profession involve pro-longed        standing or heavy lifting? Yes  7. Have you been fighting overweight problems? Yes  8. Do you have restless legs? No  9. Do you have any night time cramps? No  10.  Do you have any of the following in your legs:        Itching       Do you have a surgery or procedure scheduled in the near future - No per pt

## 2021-05-26 NOTE — ASSESSMENT & PLAN NOTE
-Recent ultrasound of lower extremities showed significant reflux in the left GSV-SF J and right CFV. Recommend to continue compression stockings.

## 2021-05-26 NOTE — PROGRESS NOTES
[address]  Phone: [phone]    Fax [phone]                  Angy Booth MD, Barbie Ken MD, Mariann Goel MD, Vikki Ano, MD Roddie Koyanagi, MD Cheron Leather, MD Sylvan Casey, APRN      Eulalia Tavera, APRN  Lissette Feldman, APRN     Amari Lowry, APRN        Cardiology Progress Note      5/26/2021    RE: Darrick Michaels  (1955)                             Primary cardiologist: Dr. Roddie Koyanagi     CC:   1. Venous reflux    2. Chronic systolic congestive heart failure ([address]    3. Bruise        HPI: Darrick Michaels, who is a  63y.o. year old female with a past medical history as listed below. Patient presents to the office for follow up on CHF and venous reflux. Patient reports bruising on left lower calf started approximately 2 weeks ago. She reports it faded and has returned. Patient is  compliant with medications. Patient denies any chest pain, shortness of breath, dizziness, syncope, or palpitations. Past Medical History:   Diagnosis Date    H/O arthritis     09- Patient reports arthritis in Knee    H/O Doppler lower venous ultrasound 03/10/2021    Significant reflux noted bilat.  H/O echocardiogram Limited 03/04/2021    EF 40-45%.  History of IBS     09- Patient told she has IBS    History of nuclear MUGA test 10/26/2020    EF 35%.  Pancreatitis        Current Outpatient Medications   Medication Sig Dispense Refill    pantoprazole (PROTONIX) 40 MG tablet Take 1 tablet by mouth daily 30 tablet 3    Elastic Bandages & Supports (MEDICAL COMPRESSION THIGH HIGH) MISC 2 each by Does not apply route daily Wear daily and remove nightly   20 - 30 mmgh 2 each 1    furosemide (LASIX) 20 MG tablet Take one tab for a weight gain of 3 lbs in a day or 5 lbs in a week. 30 tablet 2    traMADol (ULTRAM) 50 MG tablet as needed.       lisinopril (PRINIVIL;ZESTRIL) 10 MG tablet Take 1 tablet by mouth daily 30 tablet 3    metoprolol succinate (TOPROL XL) 50 MG extended release tablet Take 1 tablet by mouth daily 30 tablet 3    calcium carbonate 1500 (600 Ca) MG TABS tablet Take 1 tablet by mouth daily      sertraline (ZOLOFT) 50 MG tablet Take 1 tablet by mouth daily 30 tablet 0    traZODone (DESYREL) 50 MG tablet Take 1 tablet by mouth nightly 30 tablet 0    sucralfate (CARAFATE) 1 GM tablet Take 1 tablet by mouth 3 times daily (before meals) 90 tablet 0    Blood Pressure KIT 1 Units by Does not apply route daily 1 kit 0    atorvastatin (LIPITOR) 20 MG tablet Take 1 tablet by mouth daily 30 tablet 3    ondansetron (ZOFRAN) 4 MG tablet Take 1 tablet by mouth 3 times daily as needed for Nausea or Vomiting 15 tablet 0    acyclovir (ZOVIRAX) 800 MG tablet Take 200 mg by mouth 2 times daily      buPROPion (WELLBUTRIN XL) 300 MG extended release tablet Take 300 mg by mouth every morning      loratadine (CLARITIN) 10 MG capsule Take 10 mg by mouth daily      docusate sodium (COLACE) 100 MG capsule Take 100 mg by mouth 2 times daily as needed for Constipation. No current facility-administered medications for this visit.      Allergies: Keflex [cephalexin]  Past Surgical History:   Procedure Laterality Date    CARDIAC CATHETERIZATION      CHOLECYSTECTOMY, LAPAROSCOPIC N/A 8/22/2020    CHOLECYSTECTOMY LAPAROSCOPIC performed by Jose Lemons [facility] at [facility]  9-    HYSTERECTOMY      total     Family History   Problem Relation Age of Onset    Heart Failure Brother      Social History     Tobacco Use    Smoking status: Never Smoker    Smokeless tobacco: Never Used   Substance Use Topics    Alcohol use: Not Currently     Comment: rarely        Review of Systems - History obtained from the patient  General: negative for - fatigue, malaise, night sweats, weight gain  Psychological: negative for - anxiety, depression, sleep disturbances  Ophthalmic: negative for - blurry vision, loss of vision  ENT: negative for - headaches, vertigo, visual changes  Hematological and Lymphatic: negative for - bleeding problems, blood clots, fatigue or pallor. Positive for bruising  Endocrine: negative for - malaise/lethargy, palpitations, unexpected weight changes  Respiratory: negative for - cough, orthopnea, shortness of breath or tachypnea  Cardiovascular: negative for - chest pain, dyspnea on exertion, edema or palpitations  Gastrointestinal: no abdominal pain, change in bowel habits, or black or bloody stools  Genito-Urinary: no dysuria, trouble voiding, or hematuria  Musculoskeletal: negative for - gait disturbance, pain in left leg, swelling in    Neurological: negative for - confusion, dizziness, impaired coordination/balance or numbness/tingling    Objective:      Physical Exam:  /62   Pulse 61   Ht 5' 8\" (1.727 m)   Wt 173 lb (78.5 kg)   BMI 26.30 kg/m²   Wt Readings from Last 3 Encounters:   05/26/21 173 lb (78.5 kg)   03/01/21 178 lb 12.8 oz (81.1 kg)   01/20/21 181 lb 3.2 oz (82.2 kg)     Body mass index is 26.3 kg/m². GENERAL - Alert, oriented, pleasant, in no apparent distress. Head unremarkable  Eyes - pupils equal and reactive to light - bilateral conjunctiva are pink: sclera are white   ENT - external ears intact, nose is intact:  tongue is midline pink and moist  Neck - Supple. No jugular venous distention noted. No carotid bruits appreciated. Cardiovascular - Normal S1 and S2:  murmur appreciated No, No gallop. Regular rate- Yes,  rhythm regular-Yes. Extremities - No cyanosis, clubbing, trace edema to lower legs. Pulmonary - No respiratory distress. No wheezes or rales. Chest is clear  Pulses: Bilateral radial and pedal pulses normal  Abdomen -  Soft no tenderness, non distended   Musculoskeletal - Normal movement of all extremities   Neurologic - alert and oriented:   There are no gross focal neurologic abnormalities. Skin-  No rash: No echymosis, noted bruise on left lower calf. Affect- normal mood and pleasant     DATA:  No results found for: CKTOTAL, CKMB, CKMBINDEX, TROPONINI  BNP:  No results found for: BNP  PT/INR:  No results found for: PTINR  Lab Results   Component Value Date    LABA1C 5.0 07/29/2012     Lab Results   Component Value Date    CHOL 144 01/29/2021    TRIG 106 01/29/2021    HDL 57 01/29/2021    LDLCALC 68 01/29/2021    LDLDIRECT 102 (H) 07/29/2012     Lab Results   Component Value Date    ALT 10 01/17/2021    AST 16 01/17/2021     TSH:  No results found for: TSH    Vitals:    05/26/21 1459   BP: 120/62   Pulse: 61       Echo:8/27/20  Left ventricular systolic function is abnormal.   Ejection fraction is visually estimated at 15-20%. Mild to moderate mitral regurgitation. Mild tricuspid regurgitation; RVSP: 53 mmHg consistent with moderate PHTN. No evidence of any pericardial effusion. Echo: 3/4/21  Left ventricular systolic function is mildly depressed with an ejection   fraction of 40-45%. Grade 1 Diastolic Dysfunction. No regional wall motion abnormalities. Normal pulmonary artery pressure with a RVSP of 27mmHg. No evidence of pericardial effusion. Cath:8/28/20    1. Short left main   2. LAD and Circ are widely patent   3. RCA is patent   4.  LVEDP was 7      Recommendations   Non ischemic cardiomyopathy   medical management for CHF    The 10-year ASCVD risk score (Francisco Rodas, et al., 2013) is: 3.2%    Values used to calculate the score:      Age: 63 years      Sex: Female      Is Non- : No      Diabetic: No      Tobacco smoker: No      Systolic Blood Pressure: 537 mmHg      Is BP treated: No      HDL Cholesterol: 57 mg/dL      Total Cholesterol: 144 mg/dL      Assessment/ Plan:     Chronic systolic congestive heart failure (HCC)    Appears euvolemic   Cont with current medications, Toprol-XL 50 mg daily, lisinopril 10 mg daily, Lasix 20 mg daily.  Monitor weight daily  Limit sodium to < 2000 mg a day  Limit water to < 64 oz in a day  Call the office if a weight gain of >3 lbs in 2 days or > 5 lbs in a week is noted. Last testing: Echo 2021  EF 40-45%      Venous reflux  -Recent ultrasound of lower extremities showed significant reflux in the left GSV-SF J and right CFV. Recommend to continue compression stockings. Bruise  -Patient reports bruising on left lower calf started approximately 2 weeks ago. She reports it faded and has returned. Patient has multiple varicose veins. Recent ultrasound showed no DVT but had significant reflux. Recommend compression stockings. She does have a history of pancreatitis. Patient reports bleeding easily. She denies any abdominal pain or blood in urine or stool. She does complain of low urine output. We will get BMP, CBC, and hepatic panel. Patient seen, interviewed and examined. Testing was reviewed. Patient is encouraged to exercise even a brisk walk for 30 minutes at least 3 to 4 times a week. Lifestyle and risk factor modificatons discussed. Various goals are discussed and questions answered. Continue current medications. Appropriate prescriptions are addressed. Questions answered and patient verbalizes understanding. Call for any problems, questions, or concerns. Pt is to follow up in 6 months for Cardiac management    Electronically signed by Fred [place].  ALBERTO Magallanes CNP on 5/26/2021 at 3:45 PM

## 2021-05-26 NOTE — ASSESSMENT & PLAN NOTE
Appears euvolemic   Cont with current medications, Toprol-XL 50 mg daily, lisinopril 10 mg daily, Lasix 20 mg daily. Monitor weight daily  Limit sodium to < 2000 mg a day  Limit water to < 64 oz in a day  Call the office if a weight gain of >3 lbs in 2 days or > 5 lbs in a week is noted.   Last testing: Echo 2021  EF 40-45%

## 2021-05-26 NOTE — PATIENT INSTRUCTIONS
Please be informed that if you contact our office outside of normal business hours the physician on call cannot help with any scheduling or rescheduling issues, procedure instruction questions or any type of medication issue. We advise you for any urgent/emergency that you go to the nearest emergency room! PLEASE CALL OUR OFFICE DURING NORMAL BUSINESS HOURS    Monday - Friday   8 am to 5 pm    [place] [address]: [phone]    [place]:  [phone]  **It is YOUR responsibilty to bring medication bottles and/or updated medication list to [address] [place] [address].  This will allow us to better serve you and all your healthcare needs**

## 2021-06-09 DIAGNOSIS — I50.22 CHRONIC SYSTOLIC CONGESTIVE HEART FAILURE (HCC): ICD-10-CM

## 2021-06-09 DIAGNOSIS — I87.2 VENOUS REFLUX: ICD-10-CM

## 2021-06-09 LAB
ALBUMIN SERPL-MCNC: 4.2 G/DL (ref 3.4–5)
ALP BLD-CCNC: 97 U/L (ref 40–129)
ALT SERPL-CCNC: 13 U/L (ref 10–40)
ANION GAP SERPL CALCULATED.3IONS-SCNC: 13 MMOL/L (ref 3–16)
AST SERPL-CCNC: 20 U/L (ref 15–37)
BILIRUB SERPL-MCNC: 0.4 MG/DL (ref 0–1)
BILIRUBIN DIRECT: <0.2 MG/DL (ref 0–0.3)
BILIRUBIN, INDIRECT: NORMAL MG/DL (ref 0–1)
BUN BLDV-MCNC: 26 MG/DL (ref 7–20)
CALCIUM SERPL-MCNC: 9.3 MG/DL (ref 8.3–10.6)
CHLORIDE BLD-SCNC: 100 MMOL/L (ref 99–110)
CO2: 26 MMOL/L (ref 21–32)
CREAT SERPL-MCNC: 1.3 MG/DL (ref 0.6–1.2)
GFR AFRICAN AMERICAN: 50
GFR NON-AFRICAN AMERICAN: 41
GLUCOSE BLD-MCNC: 83 MG/DL (ref 70–99)
HCT VFR BLD CALC: 35.9 % (ref 36–48)
HEMOGLOBIN: 12.2 G/DL (ref 12–16)
MCH RBC QN AUTO: 31.5 PG (ref 26–34)
MCHC RBC AUTO-ENTMCNC: 34 G/DL (ref 31–36)
MCV RBC AUTO: 92.8 FL (ref 80–100)
PDW BLD-RTO: 13.6 % (ref 12.4–15.4)
PLATELET # BLD: 153 K/UL (ref 135–450)
PMV BLD AUTO: 8.1 FL (ref 5–10.5)
POTASSIUM SERPL-SCNC: 4.8 MMOL/L (ref 3.5–5.1)
RBC # BLD: 3.87 M/UL (ref 4–5.2)
SODIUM BLD-SCNC: 139 MMOL/L (ref 136–145)
TOTAL PROTEIN: 6.9 G/DL (ref 6.4–8.2)
WBC # BLD: 4 K/UL (ref 4–11)

## 2021-06-10 ENCOUNTER — APPOINTMENT (OUTPATIENT)
Dept: GENERAL RADIOLOGY | Age: 66
End: 2021-06-10
Payer: MEDICARE

## 2021-06-10 ENCOUNTER — HOSPITAL ENCOUNTER (EMERGENCY)
Age: 66
Discharge: HOME | End: 2021-06-10
Payer: MEDICARE

## 2021-06-10 ENCOUNTER — APPOINTMENT (OUTPATIENT)
Dept: CT IMAGING | Age: 66
End: 2021-06-10
Payer: MEDICARE

## 2021-06-10 ENCOUNTER — TELEPHONE (OUTPATIENT)
Dept: CARDIOLOGY CLINIC | Age: 66
End: 2021-06-10

## 2021-06-10 VITALS
TEMPERATURE: 97.9 F | HEART RATE: 54 BPM | BODY MASS INDEX: 26.22 KG/M2 | HEIGHT: 68 IN | SYSTOLIC BLOOD PRESSURE: 131 MMHG | WEIGHT: 173 LBS | RESPIRATION RATE: 12 BRPM | DIASTOLIC BLOOD PRESSURE: 62 MMHG | OXYGEN SATURATION: 100 %

## 2021-06-10 DIAGNOSIS — R10.13 ABDOMINAL PAIN, EPIGASTRIC: Primary | ICD-10-CM

## 2021-06-10 LAB
ALBUMIN SERPL-MCNC: 4.1 GM/DL (ref 3.4–5)
ALP BLD-CCNC: 79 IU/L (ref 40–129)
ALT SERPL-CCNC: 16 U/L (ref 10–40)
ANION GAP SERPL CALCULATED.3IONS-SCNC: 7 MMOL/L (ref 4–16)
AST SERPL-CCNC: 28 IU/L (ref 15–37)
BACTERIA: NEGATIVE /HPF
BASOPHILS ABSOLUTE: 0 K/CU MM
BASOPHILS RELATIVE PERCENT: 0.8 % (ref 0–1)
BILIRUB SERPL-MCNC: 0.3 MG/DL (ref 0–1)
BILIRUBIN URINE: NEGATIVE MG/DL
BLOOD, URINE: NEGATIVE
BUN BLDV-MCNC: 22 MG/DL (ref 6–23)
CALCIUM SERPL-MCNC: 9.1 MG/DL (ref 8.3–10.6)
CHLORIDE BLD-SCNC: 101 MMOL/L (ref 99–110)
CLARITY: CLEAR
CO2: 27 MMOL/L (ref 21–32)
COLOR: ABNORMAL
CREAT SERPL-MCNC: 1.1 MG/DL (ref 0.6–1.1)
DIFFERENTIAL TYPE: ABNORMAL
EKG ATRIAL RATE: 50 BPM
EKG DIAGNOSIS: NORMAL
EKG P AXIS: 56 DEGREES
EKG P-R INTERVAL: 174 MS
EKG Q-T INTERVAL: 466 MS
EKG QRS DURATION: 92 MS
EKG QTC CALCULATION (BAZETT): 424 MS
EKG R AXIS: 2 DEGREES
EKG T AXIS: 38 DEGREES
EKG VENTRICULAR RATE: 50 BPM
EOSINOPHILS ABSOLUTE: 0.1 K/CU MM
EOSINOPHILS RELATIVE PERCENT: 1.7 % (ref 0–3)
GFR AFRICAN AMERICAN: >60 ML/MIN/1.73M2
GFR NON-AFRICAN AMERICAN: 50 ML/MIN/1.73M2
GLUCOSE BLD-MCNC: 83 MG/DL (ref 70–99)
GLUCOSE, URINE: NEGATIVE MG/DL
HCT VFR BLD CALC: 35.3 % (ref 37–47)
HEMOGLOBIN: 11.6 GM/DL (ref 12.5–16)
IMMATURE NEUTROPHIL %: 0.2 % (ref 0–0.43)
KETONES, URINE: NEGATIVE MG/DL
LEUKOCYTE ESTERASE, URINE: NEGATIVE
LIPASE: 41 IU/L (ref 13–60)
LYMPHOCYTES ABSOLUTE: 1.7 K/CU MM
LYMPHOCYTES RELATIVE PERCENT: 35.4 % (ref 24–44)
MCH RBC QN AUTO: 31.2 PG (ref 27–31)
MCHC RBC AUTO-ENTMCNC: 32.9 % (ref 32–36)
MCV RBC AUTO: 94.9 FL (ref 78–100)
MONOCYTES ABSOLUTE: 0.5 K/CU MM
MONOCYTES RELATIVE PERCENT: 10.6 % (ref 0–4)
NITRITE URINE, QUANTITATIVE: NEGATIVE
NUCLEATED RBC %: 0 %
PDW BLD-RTO: 12.5 % (ref 11.7–14.9)
PH, URINE: 6 (ref 5–8)
PLATELET # BLD: 142 K/CU MM (ref 140–440)
PMV BLD AUTO: 9.3 FL (ref 7.5–11.1)
POTASSIUM SERPL-SCNC: 5 MMOL/L (ref 3.5–5.1)
PROTEIN UA: NEGATIVE MG/DL
RBC # BLD: 3.72 M/CU MM (ref 4.2–5.4)
RBC URINE: ABNORMAL /HPF (ref 0–6)
SEGMENTED NEUTROPHILS ABSOLUTE COUNT: 2.5 K/CU MM
SEGMENTED NEUTROPHILS RELATIVE PERCENT: 51.3 % (ref 36–66)
SODIUM BLD-SCNC: 135 MMOL/L (ref 135–145)
SPECIFIC GRAVITY UA: 1.03 (ref 1–1.03)
TOTAL IMMATURE NEUTOROPHIL: 0.01 K/CU MM
TOTAL NUCLEATED RBC: 0 K/CU MM
TOTAL PROTEIN: 6.4 GM/DL (ref 6.4–8.2)
TRICHOMONAS: ABNORMAL /HPF
TROPONIN T: <0.01 NG/ML
UROBILINOGEN, URINE: NEGATIVE MG/DL (ref 0.2–1)
WBC # BLD: 4.8 K/CU MM (ref 4–10.5)
WBC UA: <1 /HPF (ref 0–5)

## 2021-06-10 PROCEDURE — C9113 INJ PANTOPRAZOLE SODIUM, VIA: HCPCS | Performed by: PHYSICIAN ASSISTANT

## 2021-06-10 PROCEDURE — 6360000004 HC RX CONTRAST MEDICATION: Performed by: PHYSICIAN ASSISTANT

## 2021-06-10 PROCEDURE — 6360000002 HC RX W HCPCS: Performed by: PHYSICIAN ASSISTANT

## 2021-06-10 PROCEDURE — 80053 COMPREHEN METABOLIC PANEL: CPT

## 2021-06-10 PROCEDURE — 74177 CT ABD & PELVIS W/CONTRAST: CPT

## 2021-06-10 PROCEDURE — 93005 ELECTROCARDIOGRAM TRACING: CPT | Performed by: PHYSICIAN ASSISTANT

## 2021-06-10 PROCEDURE — 71045 X-RAY EXAM CHEST 1 VIEW: CPT

## 2021-06-10 PROCEDURE — 93010 ELECTROCARDIOGRAM REPORT: CPT | Performed by: INTERNAL MEDICINE

## 2021-06-10 PROCEDURE — 83690 ASSAY OF LIPASE: CPT

## 2021-06-10 PROCEDURE — 99284 EMERGENCY DEPT VISIT MOD MDM: CPT

## 2021-06-10 PROCEDURE — 85025 COMPLETE CBC W/AUTO DIFF WBC: CPT

## 2021-06-10 PROCEDURE — 84484 ASSAY OF TROPONIN QUANT: CPT

## 2021-06-10 PROCEDURE — 81001 URINALYSIS AUTO W/SCOPE: CPT

## 2021-06-10 RX ORDER — DIPHENHYDRAMINE HYDROCHLORIDE 50 MG/ML
25 INJECTION INTRAMUSCULAR; INTRAVENOUS ONCE
Status: COMPLETED | OUTPATIENT
Start: 2021-06-10 | End: 2021-06-10

## 2021-06-10 RX ORDER — 0.9 % SODIUM CHLORIDE 0.9 %
10 VIAL (ML) INJECTION
Status: COMPLETED | OUTPATIENT
Start: 2021-06-10 | End: 2021-06-10

## 2021-06-10 RX ORDER — ONDANSETRON 4 MG/1
4 TABLET, ORALLY DISINTEGRATING ORAL EVERY 6 HOURS
Qty: 10 TABLET | Refills: 0 | Status: SHIPPED | OUTPATIENT
Start: 2021-06-10

## 2021-06-10 RX ORDER — METOCLOPRAMIDE HYDROCHLORIDE 5 MG/ML
10 INJECTION INTRAMUSCULAR; INTRAVENOUS ONCE
Status: COMPLETED | OUTPATIENT
Start: 2021-06-10 | End: 2021-06-10

## 2021-06-10 RX ORDER — DICYCLOMINE HYDROCHLORIDE 10 MG/1
20 CAPSULE ORAL
Qty: 30 CAPSULE | Refills: 0 | Status: SHIPPED | OUTPATIENT
Start: 2021-06-10

## 2021-06-10 RX ORDER — PANTOPRAZOLE SODIUM 40 MG/10ML
40 INJECTION, POWDER, LYOPHILIZED, FOR SOLUTION INTRAVENOUS ONCE
Status: COMPLETED | OUTPATIENT
Start: 2021-06-10 | End: 2021-06-10

## 2021-06-10 RX ADMIN — METOCLOPRAMIDE 10 MG: 5 INJECTION, SOLUTION INTRAMUSCULAR; INTRAVENOUS at 15:23

## 2021-06-10 RX ADMIN — DIPHENHYDRAMINE HYDROCHLORIDE 25 MG: 50 INJECTION INTRAMUSCULAR; INTRAVENOUS at 15:22

## 2021-06-10 RX ADMIN — IOPAMIDOL 75 ML: 755 INJECTION, SOLUTION INTRAVENOUS at 16:01

## 2021-06-10 RX ADMIN — Medication 10 ML: at 16:02

## 2021-06-10 RX ADMIN — PANTOPRAZOLE SODIUM 40 MG: 40 INJECTION, POWDER, FOR SOLUTION INTRAVENOUS at 15:22

## 2021-06-10 ASSESSMENT — PAIN DESCRIPTION - PAIN TYPE: TYPE: ACUTE PAIN

## 2021-06-10 ASSESSMENT — PAIN DESCRIPTION - FREQUENCY: FREQUENCY: CONTINUOUS

## 2021-06-10 ASSESSMENT — PAIN SCALES - GENERAL: PAINLEVEL_OUTOF10: 5

## 2021-06-10 ASSESSMENT — PAIN DESCRIPTION - DESCRIPTORS: DESCRIPTORS: ACHING

## 2021-06-10 ASSESSMENT — PAIN DESCRIPTION - LOCATION: LOCATION: HEAD

## 2021-06-10 NOTE — ED PROVIDER NOTES
eMERGENCY dEPARTMENT eNCOUnter      PCP: ALBERTO Menon - CNP    CHIEF COMPLAINT    Chief Complaint   Patient presents with    Abdominal Pain       HPI    Emerson Jasso is a 63 y.o. female who presents with abdominal pain. Patient states that she has had intermittent right-sided abdominal pain for the past several days to weeks, states it seemed to come on whenever she was driving. States that this pain seemed to move to epigastrium yesterday, describes pain as a sharp pain with radiation towards her back. Pain has been intermittent since then with no known aggravating or alleviating factors. She states with episode of pain today she felt like she was gagging, felt nauseous. She has had no episodes of emesis. Patient also states that she has been hiccuping more frequently. Denies associated chest pain, shortness of breath, fevers or chills. REVIEW OF SYSTEMS    Constitutional:  Denies fever, chills, weight loss or weakness   HENT:  Denies sore throat or ear pain   Cardiovascular:  Denies chest pain, palpitations or swelling   Respiratory:  Denies cough or shortness of breath   GI:  See HPI above  : No hematuria or dysuria. Musculoskeletal:  Denies back pain or groin pain or masses. No pain or swelling of extremities. Skin:  Denies rash  Neurologic:  Denies headache, focal weakness or sensory changes   Endocrine:  Denies polyuria or polydypsia   Lymphatic:  Denies swollen glands     All other review of systems are negative  See HPI and nursing notes for additional information     PAST MEDICAL & SURGICAL HISTORY    Past Medical History:   Diagnosis Date    H/O arthritis     09- Patient reports arthritis in Knee    H/O Doppler lower venous ultrasound 03/10/2021    Significant reflux noted bilat.  H/O echocardiogram Limited 03/04/2021    EF 40-45%.  History of IBS     09- Patient told she has IBS    History of nuclear MUGA test 10/26/2020    EF 35%.     Pancreatitis Past Surgical History:   Procedure Laterality Date    CARDIAC CATHETERIZATION      CHOLECYSTECTOMY, LAPAROSCOPIC N/A 8/22/2020    CHOLECYSTECTOMY LAPAROSCOPIC performed by Clare Mejia MD at [address]  9-    HYSTERECTOMY      total       CURRENT MEDICATIONS    Current Outpatient Rx   Medication Sig Dispense Refill    dicyclomine (BENTYL) 10 MG capsule Take 2 capsules by mouth 4 times daily (before meals and nightly) 30 capsule 0    ondansetron (ZOFRAN ODT) 4 MG disintegrating tablet Take 1 tablet by mouth every 6 hours 10 tablet 0    pantoprazole (PROTONIX) 40 MG tablet Take 1 tablet by mouth daily 30 tablet 3    Elastic Bandages & Supports (MEDICAL COMPRESSION THIGH HIGH) MISC 2 each by Does not apply route daily Wear daily and remove nightly   20 - 30 mmgh 2 each 1    furosemide (LASIX) 20 MG tablet Take one tab for a weight gain of 3 lbs in a day or 5 lbs in a week. 30 tablet 2    traMADol (ULTRAM) 50 MG tablet as needed.       lisinopril (PRINIVIL;ZESTRIL) 10 MG tablet Take 1 tablet by mouth daily 30 tablet 3    metoprolol succinate (TOPROL XL) 50 MG extended release tablet Take 1 tablet by mouth daily 30 tablet 3    calcium carbonate 1500 (600 Ca) MG TABS tablet Take 1 tablet by mouth daily      sertraline (ZOLOFT) 50 MG tablet Take 1 tablet by mouth daily 30 tablet 0    traZODone (DESYREL) 50 MG tablet Take 1 tablet by mouth nightly 30 tablet 0    sucralfate (CARAFATE) 1 GM tablet Take 1 tablet by mouth 3 times daily (before meals) 90 tablet 0    Blood Pressure KIT 1 Units by Does not apply route daily 1 kit 0    atorvastatin (LIPITOR) 20 MG tablet Take 1 tablet by mouth daily 30 tablet 3    ondansetron (ZOFRAN) 4 MG tablet Take 1 tablet by mouth 3 times daily as needed for Nausea or Vomiting 15 tablet 0    acyclovir (ZOVIRAX) 800 MG tablet Take 200 mg by mouth 2 times daily      buPROPion (WELLBUTRIN XL) 300 MG extended release tablet Take 300 mg by mouth every morning      loratadine (CLARITIN) 10 MG capsule Take 10 mg by mouth daily      docusate sodium (COLACE) 100 MG capsule Take 100 mg by mouth 2 times daily as needed for Constipation. ALLERGIES    Allergies   Allergen Reactions    Keflex [Cephalexin]        SOCIAL AND FAMILY HISTORY    Social History     Socioeconomic History    Marital status: Single     Spouse name: None    Number of children: None    Years of education: None    Highest education level: None   Occupational History    None   Tobacco Use    Smoking status: Never Smoker    Smokeless tobacco: Never Used   Vaping Use    Vaping Use: Never used   Substance and Sexual Activity    Alcohol use: Not Currently     Comment: rarely    Drug use: Not Currently     Types: Marijuana    Sexual activity: None   Other Topics Concern    None   Social History Narrative    None     Social Determinants of Health     Financial Resource Strain:     Difficulty of Paying Living Expenses:    Food Insecurity:     Worried About Running Out of Food in the Last Year:     Ran Out of Food in the Last Year:    Transportation Needs:     Lack of Transportation (Medical):      Lack of Transportation (Non-Medical):    Physical Activity:     Days of Exercise per Week:     Minutes of Exercise per Session:    Stress:     Feeling of Stress :    Social Connections:     Frequency of Communication with Friends and Family:     Frequency of Social Gatherings with Friends and Family:     Attends Yarsani Services:     Active Member of Clubs or Organizations:     Attends Club or Organization Meetings:     Marital Status:    Intimate Partner Violence:     Fear of Current or Ex-Partner:     Emotionally Abused:     Physically Abused:     Sexually Abused:      Family History   Problem Relation Age of Onset    Heart Failure Brother        PHYSICAL EXAM    VITAL SIGNS: BP (!) 122/48   Pulse 50   Temp 97.9 °F (36.6 °C) (Oral)   Resp 14   Ht 5' 8\" (1.727 secretions on arrival to the ED and is in no acute distress. No associated chest pain or shortness of breath currently. Mild discomfort palpation of epigastrium. She is given dose of Protonix, Reglan, Benadryl. Lab work without emergent changes. Anemia noted with hemoglobin 11.6. Troponin is negative, lipase within normal limits. CT imaging of abdomen pelvis without acute abnormality. Chest x-ray without acute cardiopulmonary process. EKG interpreted by supervising physician without significant changes from previous. On subsequent evaluation, patient is completely asymptomatic. Resting in exam bed, no acute distress. Discussed above labs and imaging with patient which is overall reassuring. Patient does report that she was previously on Carafate and a PPI medication and had been taken off of this medication. I do suspect that there is some reflux/dyspepsia contributing to symptoms. Discussed possibility of esophageal spasms as well. History and exam most consistent with upper GI etiology of symptoms. We will plan on starting patient back on her GI medications to see if this is beneficial.  Low suspicion for ACS given history and reassuring work-up today. Low suspicion for other emergent intra-abdominal process. We will plan on starting these medications and having her follow-up closely with primary care within the next 1 to 2 days. Patient to immediately return here with any new or worsening symptoms and she is agreeable with this plan and comfortable with discharge. Clinical  IMPRESSION    1. Abdominal pain, epigastric        Disposition referral (if applicable):   ALBERTO Gardner - CNP  [address]  [id number]  [facility]  [phone]    Schedule an appointment as soon as possible for a visit in 1 day  For recheck of symptoms treated for today    [facility] Emergency Department  [place] [id number]  [phone]  Go to   As needed, If symptoms worsen    Donald Bartholomew MD  [address]   [phone]    Schedule an appointment as soon as possible for a visit   for GI follow up      Disposition medications (if applicable):  New Prescriptions    DICYCLOMINE (BENTYL) 10 MG CAPSULE    Take 2 capsules by mouth 4 times daily (before meals and nightly)    ONDANSETRON (ZOFRAN ODT) 4 MG DISINTEGRATING TABLET    Take 1 tablet by mouth every 6 hours         Comment: Please note this report has been produced using speech recognition software and may contain errors related to that system including errors in grammar, punctuation, and spelling, as well as words and phrases that may be inappropriate. If there are any questions or concerns please feel free to contact the dictating provider for clarification.         RICARDA Salas  06/10/21 [facility]

## 2021-06-10 NOTE — ED PROVIDER NOTES
Twelve-lead EKG obtained at 1413 on Evie 10, 2021 and interpreted by me in the absence of a cardiologist.  There is no criteria ST elevation or reciprocal change. There are no hyperacute T wave changes. There is no sign of acute ischemia or infarction. This tracing shows a sinus bradycardia with biphasic T waves in V1 and V2. Rate and intervals are 50 beats per minute, RI interval 174 milliseconds, QRS duration 92 milliseconds, QTc interval 424 milliseconds, and R axis normal at 2 degrees. There is no acute change compared with the most recent EKG dated January 17, 2021.         Tee Mendez MD  06/10/21 [id number]

## 2021-06-14 ENCOUNTER — OFFICE VISIT (OUTPATIENT)
Dept: CARDIOLOGY CLINIC | Age: 66
End: 2021-06-14
Payer: MEDICARE

## 2021-06-14 VITALS
HEIGHT: 68 IN | DIASTOLIC BLOOD PRESSURE: 64 MMHG | SYSTOLIC BLOOD PRESSURE: 102 MMHG | BODY MASS INDEX: 26.07 KG/M2 | WEIGHT: 172 LBS | HEART RATE: 55 BPM

## 2021-06-14 DIAGNOSIS — I42.8 NICM (NONISCHEMIC CARDIOMYOPATHY) (HCC): Primary | ICD-10-CM

## 2021-06-14 PROCEDURE — 3017F COLORECTAL CA SCREEN DOC REV: CPT | Performed by: INTERNAL MEDICINE

## 2021-06-14 PROCEDURE — G8417 CALC BMI ABV UP PARAM F/U: HCPCS | Performed by: INTERNAL MEDICINE

## 2021-06-14 PROCEDURE — G8427 DOCREV CUR MEDS BY ELIG CLIN: HCPCS | Performed by: INTERNAL MEDICINE

## 2021-06-14 PROCEDURE — 1036F TOBACCO NON-USER: CPT | Performed by: INTERNAL MEDICINE

## 2021-06-14 PROCEDURE — 99214 OFFICE O/P EST MOD 30 MIN: CPT | Performed by: INTERNAL MEDICINE

## 2021-06-14 RX ORDER — METOPROLOL SUCCINATE 50 MG/1
25 TABLET, EXTENDED RELEASE ORAL DAILY
Qty: 30 TABLET | Refills: 3
Start: 2021-06-14 | End: 2021-09-14 | Stop reason: SDUPTHER

## 2021-06-14 NOTE — PATIENT INSTRUCTIONS
**It is YOUR responsibilty to bring medication bottles and/or updated medication list to [address] [place] [address]. This will allow us to better serve you and all your healthcare needs**    Please be informed that if you contact our office outside of normal business hours the physician on call cannot help with any scheduling or rescheduling issues, procedure instruction questions or any type of medication issue. We advise you for any urgent/emergency that you go to the nearest emergency room!     PLEASE CALL OUR OFFICE DURING NORMAL BUSINESS HOURS    Monday - Friday   8 am to 5 pm    [place]: [address]: [phone]    [place]:  [phone]

## 2021-06-14 NOTE — PROGRESS NOTES
Omaira Harris MD        OFFICE  FOLLOWUP NOTE    Chief complaints: patient is here for management of NICM, pancreatitis,IBS    F/u after epigastric pain and had gagging and hicups    Subjective: patient feels better, no chest pain, no shortness of breath, no dizziness, no palpitations    HPI Breann Albrecht is a 63 y. o.year old who  has a past medical history of H/O arthritis, H/O Doppler lower venous ultrasound, H/O echocardiogram Limited, History of IBS, History of nuclear MUGA test, and Pancreatitis. and presents for management of NICM, pancreatitis,IBS, which are well controlled    She feels light headed, she was seen in ED for epigastric pain and referred to GI, her EKG showed sinus bradycardia@ 50 bpm.  Current Outpatient Medications   Medication Sig Dispense Refill    dicyclomine (BENTYL) 10 MG capsule Take 2 capsules by mouth 4 times daily (before meals and nightly) 30 capsule 0    ondansetron (ZOFRAN ODT) 4 MG disintegrating tablet Take 1 tablet by mouth every 6 hours 10 tablet 0    pantoprazole (PROTONIX) 40 MG tablet Take 1 tablet by mouth daily 30 tablet 3    Elastic Bandages & Supports (MEDICAL COMPRESSION THIGH HIGH) MISC 2 each by Does not apply route daily Wear daily and remove nightly   20 - 30 mmgh 2 each 1    furosemide (LASIX) 20 MG tablet Take one tab for a weight gain of 3 lbs in a day or 5 lbs in a week. 30 tablet 2    traMADol (ULTRAM) 50 MG tablet as needed.       lisinopril (PRINIVIL;ZESTRIL) 10 MG tablet Take 1 tablet by mouth daily 30 tablet 3    metoprolol succinate (TOPROL XL) 50 MG extended release tablet Take 1 tablet by mouth daily 30 tablet 3    calcium carbonate 1500 (600 Ca) MG TABS tablet Take 1 tablet by mouth daily      sertraline (ZOLOFT) 50 MG tablet Take 1 tablet by mouth daily 30 tablet 0    traZODone (DESYREL) 50 MG tablet Take 1 tablet by mouth nightly 30 tablet 0    sucralfate (CARAFATE) 1 GM tablet Take 1 tablet by mouth 3 times daily (before meals) 90 tablet 0    Blood Pressure KIT 1 Units by Does not apply route daily 1 kit 0    atorvastatin (LIPITOR) 20 MG tablet Take 1 tablet by mouth daily 30 tablet 3    ondansetron (ZOFRAN) 4 MG tablet Take 1 tablet by mouth 3 times daily as needed for Nausea or Vomiting 15 tablet 0    acyclovir (ZOVIRAX) 800 MG tablet Take 200 mg by mouth 2 times daily      buPROPion (WELLBUTRIN XL) 300 MG extended release tablet Take 300 mg by mouth every morning      loratadine (CLARITIN) 10 MG capsule Take 10 mg by mouth daily      docusate sodium (COLACE) 100 MG capsule Take 100 mg by mouth 2 times daily as needed for Constipation. No current facility-administered medications for this visit. Allergies: Keflex [cephalexin]  Past Medical History:   Diagnosis Date    H/O arthritis     09- Patient reports arthritis in Knee    H/O Doppler lower venous ultrasound 03/10/2021    Significant reflux noted bilat.  H/O echocardiogram Limited 03/04/2021    EF 40-45%.  History of IBS     09- Patient told she has IBS    History of nuclear MUGA test 10/26/2020    EF 35%.     Pancreatitis      Past Surgical History:   Procedure Laterality Date    CARDIAC CATHETERIZATION      CHOLECYSTECTOMY, LAPAROSCOPIC N/A 8/22/2020    CHOLECYSTECTOMY LAPAROSCOPIC performed by Nick Sharif MD at [address] [place] [address]  9-    HYSTERECTOMY      total     Family History   Problem Relation Age of Onset    Heart Failure Brother      Social History     Tobacco Use    Smoking status: Never Smoker    Smokeless tobacco: Never Used   Substance Use Topics    Alcohol use: Not Currently     Comment: rarely      [unfilled]  Review of Systems:   · Constitutional: No Fever or Weight Loss   · Eyes: No Decreased Vision  · ENT: No Headaches, Hearing Loss or Vertigo  · Cardiovascular: No chest pain, dyspnea on exertion, palpitations or loss of consciousness  · Respiratory: No cough or wheezing    · Gastrointestinal: No intact. There were no gross focal neurologic abnormalities. Psychiatric: normal mood and affect    No results found for: CKTOTAL, CKMB, CKMBINDEX, TROPONINI  BNP:  No results found for: BNP  PT/INR:  No results found for: PTINR  Lab Results   Component Value Date    LABA1C 5.0 07/29/2012     Lab Results   Component Value Date    CHOL 144 01/29/2021    TRIG 106 01/29/2021    HDL 57 01/29/2021    LDLCALC 68 01/29/2021    LDLDIRECT 102 (H) 07/29/2012     Lab Results   Component Value Date    ALT 16 06/10/2021    AST 28 06/10/2021     TSH:  No results found for: TSH  EKG: sinus bradycardia @ 50 bpm  Impression:  Mague Guadalupe is a 63 y. o.year old who  has a past medical history of H/O arthritis, H/O Doppler lower venous ultrasound, H/O echocardiogram Limited, History of IBS, History of nuclear MUGA test, and Pancreatitis. and presents with     Plan:  1. Bradycardia\" decrease metoprolol to 25mg daily  2. NICM\": she lost almost 75 lbs, she is OFF lasix,  will continue low dose lasix prn for leg swelling, BB AND lisinopril and her LVEF improved,no need for ICD at this time, LVEF  On MUGA is 35%,, will recheck echo, WILL RECOMMEND to avoid alcohol if LV ef is still depressed, she is ok to start work  3. Stasis dermatitis and leg swelling: venous doppler ordered and compression socks recommended  4. Pancreatitis: resolved, RECOMMEND TO AVOID ALCOHOL  5. IBS : AS PER GI  1. H/O cholescystectomy: stable  2. Health maintenance: exerise and diet  All labs, medications and tests reviewed, continue all other medications of all above medical condition listed as is.     [unfilled]

## 2021-06-14 NOTE — LETTER
Stevan Corona [facility]  1955  [id number]    Have you had any Chest Pain that is not new? - No       DO EKG IF: Patient has a Heart Rate above 100 or below 40     CAD (Coronary Artery Disease) patient should have one on file every 6 months        Have you had any Shortness of Breath - No      Have you had any dizziness - No, Feels off balance        Sitting wait 5 minutes do supine (laying down) wait 5 minutes then do standing - log each in \"vitals\" area in Epic   Be sure to ask what symptoms they are having if they get dizzy while completing ortho stats such as: room spinning, nausea, etc.    Have you had any palpitations that are not new? - No      Do you have any edema - swelling in Legs   If Yes - CHECK TO SEE IF THE EDEMA IS PITTING  If Yes - Have they worn compression stockings Yes    Vein \"LEG PROBLEM Questionnaire\"  1. Do you have prominent leg veins? No   2. Do you have any skin discoloration? Yes  3. Do you have any healed or active sores? No  4. Do you have swelling of the legs? Yes  5. Do you have a family history of varicose veins? Yes  6. Does your profession involve pro-longed        standing or heavy lifting? Yes  7. Have you been fighting overweight problems? Yes  8. Do you have restless legs? No  9. Do you have any night time cramps? No  10. Do you have any of the following in your legs:        NONE     When did you have your last labs drawn   Where did you have them done   What doctor ordered     If we do not have these labs you are retrieve these labs for these providers!     Do you have a surgery or procedure scheduled in the near future - No

## 2021-07-17 ENCOUNTER — PROCEDURE VISIT (OUTPATIENT)
Dept: CARDIOLOGY CLINIC | Age: 66
End: 2021-07-17
Payer: MEDICARE

## 2021-07-17 DIAGNOSIS — I42.8 NICM (NONISCHEMIC CARDIOMYOPATHY) (HCC): Primary | ICD-10-CM

## 2021-07-17 PROCEDURE — 93308 TTE F-UP OR LMTD: CPT | Performed by: INTERNAL MEDICINE

## 2021-07-21 ENCOUNTER — TELEPHONE (OUTPATIENT)
Dept: CARDIOLOGY CLINIC | Age: 66
End: 2021-07-21

## 2021-07-21 NOTE — TELEPHONE ENCOUNTER
Advised patient of results. Patient voiced understanding. Left ventricular systolic function is normal with an ejection fraction of   50%. No regional wall motion abnormality. No evidence of pericardial effusion.

## 2021-09-14 ENCOUNTER — OFFICE VISIT (OUTPATIENT)
Dept: CARDIOLOGY CLINIC | Age: 66
End: 2021-09-14
Payer: MEDICARE

## 2021-09-14 VITALS
WEIGHT: 172.8 LBS | BODY MASS INDEX: 26.19 KG/M2 | DIASTOLIC BLOOD PRESSURE: 54 MMHG | HEART RATE: 68 BPM | SYSTOLIC BLOOD PRESSURE: 88 MMHG | HEIGHT: 68 IN

## 2021-09-14 DIAGNOSIS — I42.9 CARDIOMYOPATHY, UNSPECIFIED TYPE (HCC): ICD-10-CM

## 2021-09-14 DIAGNOSIS — I10 ESSENTIAL HYPERTENSION: ICD-10-CM

## 2021-09-14 DIAGNOSIS — R00.1 BRADYCARDIA: ICD-10-CM

## 2021-09-14 DIAGNOSIS — I87.2 VENOUS REFLUX: Primary | ICD-10-CM

## 2021-09-14 PROCEDURE — G8417 CALC BMI ABV UP PARAM F/U: HCPCS | Performed by: NURSE PRACTITIONER

## 2021-09-14 PROCEDURE — G8427 DOCREV CUR MEDS BY ELIG CLIN: HCPCS | Performed by: NURSE PRACTITIONER

## 2021-09-14 PROCEDURE — 99214 OFFICE O/P EST MOD 30 MIN: CPT | Performed by: NURSE PRACTITIONER

## 2021-09-14 PROCEDURE — 3017F COLORECTAL CA SCREEN DOC REV: CPT | Performed by: NURSE PRACTITIONER

## 2021-09-14 PROCEDURE — 1036F TOBACCO NON-USER: CPT | Performed by: NURSE PRACTITIONER

## 2021-09-14 RX ORDER — METOPROLOL SUCCINATE 25 MG/1
25 TABLET, EXTENDED RELEASE ORAL DAILY
Qty: 30 TABLET | Refills: 1 | Status: SHIPPED | OUTPATIENT
Start: 2021-09-14 | End: 2022-10-13 | Stop reason: SDUPTHER

## 2021-09-14 RX ORDER — LISINOPRIL 5 MG/1
5 TABLET ORAL DAILY
Qty: 30 TABLET | Refills: 2 | Status: SHIPPED | OUTPATIENT
Start: 2021-09-14 | End: 2022-10-13 | Stop reason: SDUPTHER

## 2021-09-14 NOTE — ASSESSMENT & PLAN NOTE
-Patient had nonischemic cardiomyopathy. Most recent echo shows cardiomyopathy is resolved EF 50-55%.

## 2021-09-14 NOTE — LETTER
Fatemeh Brown  1955  [id number]    Have you had any Chest Pain that is not new? - No    Have you had any Shortness of Breath - Yes  If Yes - When on exertion    Have you had any dizziness - Yes  If Yes DO ORTHOSTATIC BP - when do you feel dizzy with position change   How long does it last - a few seconds   Suspects this is due to BP medicine. BP has been low. ( systolic)     Have you had any palpitations that are not new? - No    Do you have any edema - swelling in left leg (a little on the right)   If Yes - CHECK TO SEE IF THE EDEMA IS PITTING  How long have they been having edema - Years  If Yes - Have they worn compression stockings - only when she goes out (at home, she does not wear these)     Vein \"LEG PROBLEM Questionnaire\"  1. Do you have prominent leg veins? No   2. Do you have any skin discoloration? No  3. Do you have any healed or active sores? No  4. Do you have swelling of the legs? Yes  5. Do you have a family history of varicose veins? Patient thinks her mother had varicose veins. 6. Does your profession involve pro-longed        standing or heavy lifting? No  7. Have you been fighting overweight problems? Fluctuations (between 178-179)   8. Do you have restless legs? Not sure  9. Do you have any night time cramps? Not sure  10. Do you have any of the following in your legs:        Aching (in the knees)     Do you have a surgery or procedure scheduled in the near future - No     Patients wants to have teeth pulled and seeking to undergo knee surgery.

## 2021-09-14 NOTE — PROGRESS NOTES
[address] [facility],Third Floor  Phone: [phone]    Fax [phone]                  Abhinav Newell MD, Mumtaz Gill MD, [address] [facility], MD, MD Roberta Begum MD Sammy Leigh, MD Ozie Palms, APRN      Reed Estrada, APRN  Selin Thompson, APRN     Jessica Pulido, APRN        Cardiology Progress Note      9/14/2021    RE: Deya Ortega  (1955)                             Primary cardiologist: Dr. Roberta Hobbs     CC:   1. Venous reflux    2. Cardiomyopathy, unspecified type ([address]    3. Bradycardia    4. Essential hypertension        HPI: Deya Ortega, who is a  63y.o. year old female with a past medical history as listed below. Patient presents to the office for follow up on cardiomyopathy, bradycardia, hypertension, and venous reflux. Patient reports occasional dizziness associated with position change. Patient previously had medications adjusted due to bradycardia. Patient denies any chest pain, shortness of breath,  syncope, or palpitations. Past Medical History:   Diagnosis Date    H/O arthritis     09- Patient reports arthritis in Knee    H/O Doppler lower venous ultrasound 03/10/2021    Significant reflux noted bilat.  H/O echocardiogram Limited 03/04/2021    EF 40-45%.  History of echocardiogram 07/17/2021    EF 50%, Normal    History of IBS     09- Patient told she has IBS    History of nuclear MUGA test 10/26/2020    EF 35%.     Pancreatitis        Current Outpatient Medications   Medication Sig Dispense Refill    metoprolol succinate (TOPROL XL) 50 MG extended release tablet Take 0.5 tablets by mouth daily 30 tablet 3    dicyclomine (BENTYL) 10 MG capsule Take 2 capsules by mouth 4 times daily (before meals and nightly) 30 capsule 0    ondansetron (ZOFRAN ODT) 4 MG disintegrating tablet Take 1 tablet by mouth every 6 hours 10 tablet 0    pantoprazole (PROTONIX) 40 MG tablet Take 1 tablet by mouth daily 30 tablet 3    Elastic Bandages & Supports (MEDICAL COMPRESSION THIGH HIGH) MISC 2 each by Does not apply route daily Wear daily and remove nightly   20 - 30 mmgh 2 each 1    furosemide (LASIX) 20 MG tablet Take one tab for a weight gain of 3 lbs in a day or 5 lbs in a week. 30 tablet 2    traMADol (ULTRAM) 50 MG tablet as needed.  lisinopril (PRINIVIL;ZESTRIL) 10 MG tablet Take 1 tablet by mouth daily 30 tablet 3    calcium carbonate 1500 (600 Ca) MG TABS tablet Take 1 tablet by mouth daily      sertraline (ZOLOFT) 50 MG tablet Take 1 tablet by mouth daily 30 tablet 0    traZODone (DESYREL) 50 MG tablet Take 1 tablet by mouth nightly 30 tablet 0    sucralfate (CARAFATE) 1 GM tablet Take 1 tablet by mouth 3 times daily (before meals) 90 tablet 0    Blood Pressure KIT 1 Units by Does not apply route daily 1 kit 0    atorvastatin (LIPITOR) 20 MG tablet Take 1 tablet by mouth daily 30 tablet 3    ondansetron (ZOFRAN) 4 MG tablet Take 1 tablet by mouth 3 times daily as needed for Nausea or Vomiting 15 tablet 0    acyclovir (ZOVIRAX) 800 MG tablet Take 200 mg by mouth 2 times daily      buPROPion (WELLBUTRIN XL) 300 MG extended release tablet Take 300 mg by mouth every morning      loratadine (CLARITIN) 10 MG capsule Take 10 mg by mouth daily      docusate sodium (COLACE) 100 MG capsule Take 100 mg by mouth 2 times daily as needed for Constipation. No current facility-administered medications for this visit.      Allergies: Keflex [cephalexin]  Past Surgical History:   Procedure Laterality Date    CARDIAC CATHETERIZATION      CHOLECYSTECTOMY, LAPAROSCOPIC N/A 8/22/2020    CHOLECYSTECTOMY LAPAROSCOPIC performed by Brittany Meza MD at [place] [address]  9-    HYSTERECTOMY      total     Family History   Problem Relation Age of Onset    Heart Failure Brother      Social History     Tobacco Use    Smoking status: Never Smoker  Smokeless tobacco: Never Used   Substance Use Topics    Alcohol use: Not Currently     Comment: rarely        Review of Systems - History obtained from the patient  General: negative for - fatigue, malaise, night sweats, weight gain  Psychological: negative for - anxiety, depression, sleep disturbances  Ophthalmic: negative for - blurry vision, loss of vision  ENT: negative for - headaches, vertigo, visual changes  Hematological and Lymphatic: negative for - bleeding problems, blood clots, fatigue or pallor. Endocrine: negative for - malaise/lethargy, palpitations, unexpected weight changes  Respiratory: negative for - cough, orthopnea, shortness of breath or tachypnea  Cardiovascular: negative for - chest pain, dyspnea on exertion, edema or palpitations  Gastrointestinal: no abdominal pain, change in bowel habits, or black or bloody stools  Genito-Urinary: no dysuria, trouble voiding, or hematuria  Musculoskeletal: negative for - gait disturbance, pain in left leg and swelling   Neurological: negative for - confusion, dizziness, impaired coordination/balance or numbness/tingling    Objective:      Physical Exam:  There were no vitals taken for this visit. Wt Readings from Last 3 Encounters:   06/14/21 172 lb (78 kg)   06/10/21 173 lb (78.5 kg)   05/26/21 173 lb (78.5 kg)     There is no height or weight on file to calculate BMI. GENERAL - Alert, oriented, pleasant, in no apparent distress. Head unremarkable  Eyes - pupils equal and reactive to light - bilateral conjunctiva are pink: sclera are white   ENT - external ears intact, nose is intact:  tongue is midline pink and moist  Neck - Supple. No jugular venous distention noted. No carotid bruits appreciated. Cardiovascular - Normal S1 and S2:  murmur appreciated No, No gallop. Regular rate- Yes,  rhythm regular-Yes. Extremities - No cyanosis, clubbing, trace edema to lower legs. Pulmonary - No respiratory distress. No wheezes or rales.  Chest is clear  Pulses: Bilateral radial and pedal pulses normal  Abdomen -  Soft no tenderness, non distended   Musculoskeletal - Normal movement of all extremities   Neurologic - alert and oriented: There are no gross focal neurologic abnormalities. Skin-  No rash: No echymosis,   Affect- normal mood and pleasant     DATA:  No results found for: CKTOTAL, CKMB, CKMBINDEX, TROPONINI  BNP:  No results found for: BNP  PT/INR:  No results found for: PTINR  Lab Results   Component Value Date    LABA1C 5.0 07/29/2012     Lab Results   Component Value Date    CHOL 144 01/29/2021    TRIG 106 01/29/2021    HDL 57 01/29/2021    LDLCALC 68 01/29/2021    LDLDIRECT 102 (H) 07/29/2012     Lab Results   Component Value Date    ALT 16 06/10/2021    AST 28 06/10/2021     TSH:  No results found for: TSH    There were no vitals filed for this visit. Echo:8/27/20  Left ventricular systolic function is abnormal.   Ejection fraction is visually estimated at 15-20%. Mild to moderate mitral regurgitation. Mild tricuspid regurgitation; RVSP: 53 mmHg consistent with moderate PHTN. No evidence of any pericardial effusion. Echo: 3/4/21  Left ventricular systolic function is mildly depressed with an ejection   fraction of 40-45%. Grade 1 Diastolic Dysfunction. No regional wall motion abnormalities. Normal pulmonary artery pressure with a RVSP of 27mmHg. No evidence of pericardial effusion. Echo: 7/17/21  Left ventricular systolic function is normal with an ejection fraction of   50%. No regional wall motion abnormality. No evidence of pericardial effusion. Cath:8/28/20    1. Short left main   2. LAD and Circ are widely patent   3. RCA is patent   4.  LVEDP was 7      Recommendations   Non ischemic cardiomyopathy   medical management for CHF    The 10-year ASCVD risk score (René Kendrick, et al., 2013) is: 2.3%    Values used to calculate the score:      Age: 63 years      Sex: Female      Is Non-  American: No      Diabetic: No      Tobacco smoker: No      Systolic Blood Pressure: 390 mmHg      Is BP treated: No      HDL Cholesterol: 57 mg/dL      Total Cholesterol: 144 mg/dL      Assessment/ Plan:     Venous reflux  -Significant reflux of left GSV-SF J and right CFV recommend continue compression stockings. Cardiomyopathy [facility])   -Patient had nonischemic cardiomyopathy. Most recent echo shows cardiomyopathy is resolved EF 50-55%. Bradycardia   -Patient had episode of bradycardia and Toprol was reduced. Bradycardia has since resolved. Essential hypertension    -Patient currently hypotensive, decrease lisinopril to 5 mg daily and Toprol-XL 25 mg daily      Patient seen, interviewed and examined. Testing was reviewed. Patient is encouraged to exercise even a brisk walk for 30 minutes at least 3 to 4 times a week. Lifestyle and risk factor modificatons discussed. Various goals are discussed and questions answered. Continue current medications. Appropriate prescriptions are addressed. Questions answered and patient verbalizes understanding. Call for any problems, questions, or concerns. Pt is to follow up in 2 weeks for Cardiac management    Electronically signed by Katlyn Nguyễn.  ALBERTO De Los Santos CNP on 9/14/2021 at 1:07 PM

## 2021-09-28 ENCOUNTER — NURSE ONLY (OUTPATIENT)
Dept: CARDIOLOGY CLINIC | Age: 66
End: 2021-09-28
Payer: MEDICARE

## 2021-09-28 VITALS
BODY MASS INDEX: 25.8 KG/M2 | SYSTOLIC BLOOD PRESSURE: 120 MMHG | DIASTOLIC BLOOD PRESSURE: 70 MMHG | HEIGHT: 69 IN | HEART RATE: 64 BPM | WEIGHT: 174.2 LBS

## 2021-09-28 DIAGNOSIS — R00.1 BRADYCARDIA: Primary | ICD-10-CM

## 2021-09-28 PROCEDURE — 99211 OFF/OP EST MAY X REQ PHY/QHP: CPT | Performed by: NURSE PRACTITIONER

## 2021-09-28 NOTE — PROGRESS NOTES
[facility] ([facility] PHYSICAL REHABILITATION [place] [facility] [address] [facility],Third Floor  Phone: [phone]    Fax [phone]                  Neno Caban MD, Lea Harding MD, [address] [facility], MD, MD Celia Pro MD,[facility]    Nargis Steen MD, Simone Bustamante MD, [address] [facility], [facility], APRVIVIANA Sullivan, APRN Fredy Cabot, APRN      Blood pressure check     Pt is here for a 2 week BP check. Patient was recently hypotensive and lisinopril was decreased to 5 mg. Vitals:    09/28/21 1554   BP: 120/70   Site: Left Upper Arm   Position: Sitting   Cuff Size: Medium Adult   Pulse: 64   Weight: 174 lb 3.2 oz (79 kg)   Height: 5' 8.5\" (1.74 m)        Wt Readings from Last 3 Encounters:   09/28/21 174 lb 3.2 oz (79 kg)   09/14/21 172 lb 12.8 oz (78.4 kg)   06/14/21 172 lb (78 kg)        Plan for pt is to continue with current medications. Lisinopril 5 mg daily and Toprol 25 mg daily. Follow up in 6 months. Pt is to report any dizziness or syncope to the office. Electronically signed by Corky Billingsley.  ALBERTO Carter - CNP on 9/28/2021 at 4:07 PM

## 2021-09-28 NOTE — PATIENT INSTRUCTIONS
**It is YOUR responsibilty to bring medication bottles and/or updated medication list to [address] [place] [address]. This will allow us to better serve you and all your healthcare needs**    Please be informed that if you contact our office outside of normal business hours the physician on call cannot help with any scheduling or rescheduling issues, procedure instruction questions or any type of medication issue. We advise you for any urgent/emergency that you go to the nearest emergency room!     PLEASE CALL OUR OFFICE DURING NORMAL BUSINESS HOURS    Monday - Friday   8 am to 5 pm    [place]: [address]: [phone]    [place]:  [phone]

## 2022-01-13 ENCOUNTER — TELEPHONE (OUTPATIENT)
Dept: CARDIOLOGY CLINIC | Age: 67
End: 2022-01-13

## 2022-01-13 NOTE — TELEPHONE ENCOUNTER
Patient called she just found out she is positive for COVID and is not vaccinated , she does not believe in the vaccines , she is concerned if this will effect her heart

## 2022-01-17 ENCOUNTER — TELEPHONE (OUTPATIENT)
Dept: CARDIOLOGY CLINIC | Age: 67
End: 2022-01-17

## 2022-01-17 NOTE — TELEPHONE ENCOUNTER
Patient called stating that she has a very bad cold and wants to know what she can take, please call her back at ph# [phone].

## 2022-01-31 ENCOUNTER — TELEPHONE (OUTPATIENT)
Dept: CARDIOLOGY CLINIC | Age: 67
End: 2022-01-31
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
## 2022-01-31 NOTE — TELEPHONE ENCOUNTER
Patient extremely stressed with issues of son's illness.  She has made contact with PCP for treatment

## 2022-04-11 ENCOUNTER — OFFICE VISIT (OUTPATIENT)
Dept: CARDIOLOGY CLINIC | Age: 67
End: 2022-04-11
Payer: COMMERCIAL

## 2022-04-11 VITALS
SYSTOLIC BLOOD PRESSURE: 130 MMHG | DIASTOLIC BLOOD PRESSURE: 76 MMHG | WEIGHT: 179 LBS | HEIGHT: 68 IN | BODY MASS INDEX: 27.13 KG/M2 | HEART RATE: 64 BPM

## 2022-04-11 DIAGNOSIS — I42.8 NICM (NONISCHEMIC CARDIOMYOPATHY) (HCC): Primary | ICD-10-CM

## 2022-04-11 PROCEDURE — G8428 CUR MEDS NOT DOCUMENT: HCPCS | Performed by: INTERNAL MEDICINE

## 2022-04-11 PROCEDURE — 3017F COLORECTAL CA SCREEN DOC REV: CPT | Performed by: INTERNAL MEDICINE

## 2022-04-11 PROCEDURE — G8417 CALC BMI ABV UP PARAM F/U: HCPCS | Performed by: INTERNAL MEDICINE

## 2022-04-11 PROCEDURE — 99214 OFFICE O/P EST MOD 30 MIN: CPT | Performed by: INTERNAL MEDICINE

## 2022-04-11 PROCEDURE — 1036F TOBACCO NON-USER: CPT | Performed by: INTERNAL MEDICINE
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
## 2022-07-14 ENCOUNTER — HOSPITAL ENCOUNTER (OUTPATIENT)
Dept: MAMMOGRAPHY | Age: 67
Discharge: HOME | End: 2022-07-14
Payer: COMMERCIAL

## 2022-07-14 DIAGNOSIS — Z12.31 SCREENING MAMMOGRAM, ENCOUNTER FOR: ICD-10-CM

## 2022-07-14 PROCEDURE — 77067 SCR MAMMO BI INCL CAD: CPT

## 2022-08-01 ENCOUNTER — HOSPITAL ENCOUNTER (OUTPATIENT)
Dept: ULTRASOUND IMAGING | Age: 67
Discharge: HOME | End: 2022-08-01
Payer: COMMERCIAL

## 2022-08-01 ENCOUNTER — HOSPITAL ENCOUNTER (OUTPATIENT)
Dept: WOMENS IMAGING | Age: 67
Discharge: HOME | End: 2022-08-01
Payer: COMMERCIAL

## 2022-08-01 DIAGNOSIS — R92.8 ABNORMAL MAMMOGRAM: ICD-10-CM

## 2022-08-01 PROCEDURE — 76642 ULTRASOUND BREAST LIMITED: CPT

## 2022-08-01 PROCEDURE — 77065 DX MAMMO INCL CAD UNI: CPT

## 2022-08-12 ENCOUNTER — TELEPHONE (OUTPATIENT)
Dept: CARDIOLOGY CLINIC | Age: 67
End: 2022-08-12
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
## 2022-08-15 NOTE — TELEPHONE ENCOUNTER
Patient states she just had injections to knee, surgery will be delay few months.  Will clear at October OV

## 2022-10-13 ENCOUNTER — OFFICE VISIT (OUTPATIENT)
Dept: CARDIOLOGY CLINIC | Age: 67
End: 2022-10-13
Payer: COMMERCIAL

## 2022-10-13 VITALS
SYSTOLIC BLOOD PRESSURE: 106 MMHG | BODY MASS INDEX: 28.37 KG/M2 | WEIGHT: 187.2 LBS | HEIGHT: 68 IN | DIASTOLIC BLOOD PRESSURE: 62 MMHG | HEART RATE: 60 BPM

## 2022-10-13 DIAGNOSIS — I73.9 PVD (PERIPHERAL VASCULAR DISEASE) (HCC): ICD-10-CM

## 2022-10-13 PROCEDURE — G8484 FLU IMMUNIZE NO ADMIN: HCPCS | Performed by: INTERNAL MEDICINE

## 2022-10-13 PROCEDURE — 1036F TOBACCO NON-USER: CPT | Performed by: INTERNAL MEDICINE

## 2022-10-13 PROCEDURE — 93000 ELECTROCARDIOGRAM COMPLETE: CPT | Performed by: INTERNAL MEDICINE

## 2022-10-13 PROCEDURE — G8428 CUR MEDS NOT DOCUMENT: HCPCS | Performed by: INTERNAL MEDICINE

## 2022-10-13 PROCEDURE — G8417 CALC BMI ABV UP PARAM F/U: HCPCS | Performed by: INTERNAL MEDICINE

## 2022-10-13 PROCEDURE — 3017F COLORECTAL CA SCREEN DOC REV: CPT | Performed by: INTERNAL MEDICINE

## 2022-10-13 PROCEDURE — 99214 OFFICE O/P EST MOD 30 MIN: CPT | Performed by: INTERNAL MEDICINE

## 2022-10-13 RX ORDER — FUROSEMIDE 20 MG/1
TABLET ORAL
Qty: 30 TABLET | Refills: 2 | Status: SHIPPED | OUTPATIENT
Start: 2022-10-13

## 2022-10-13 RX ORDER — LISINOPRIL 5 MG/1
5 TABLET ORAL DAILY
Qty: 30 TABLET | Refills: 2 | Status: SHIPPED | OUTPATIENT
Start: 2022-10-13

## 2022-10-13 RX ORDER — PANTOPRAZOLE SODIUM 40 MG/1
40 TABLET, DELAYED RELEASE ORAL DAILY
Qty: 30 TABLET | Refills: 3 | Status: SHIPPED | OUTPATIENT
Start: 2022-10-13

## 2022-10-13 RX ORDER — METOPROLOL SUCCINATE 25 MG/1
25 TABLET, EXTENDED RELEASE ORAL DAILY
Qty: 30 TABLET | Refills: 1 | Status: SHIPPED | OUTPATIENT
Start: 2022-10-13

## 2022-10-13 NOTE — PATIENT INSTRUCTIONS
Please be informed that if you contact our office outside of normal business hours the physician on call cannot help with any scheduling or rescheduling issues, procedure instruction questions or any type of medication issue. We advise you for any urgent/emergency that you go to the nearest emergency room! PLEASE CALL OUR OFFICE DURING NORMAL BUSINESS HOURS    Monday - Friday   8 am to 5 pm    [place]: [address]: [phone]    [place]:  [phone]    **It is YOUR responsibilty to bring medication bottles and/or updated medication list to [address] [place] [address].  This will allow us to better serve you and all your healthcare needs**
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
## 2022-11-29 ENCOUNTER — HOSPITAL ENCOUNTER (OUTPATIENT)
Age: 67
Setting detail: SPECIMEN
Discharge: HOME | End: 2022-11-29

## 2022-11-29 LAB — POTASSIUM SERPL-SCNC: 4.3 MMOL/L (ref 3.5–5.1)

## 2022-11-29 PROCEDURE — 84132 ASSAY OF SERUM POTASSIUM: CPT

## 2023-05-04 ENCOUNTER — OFFICE VISIT (OUTPATIENT)
Dept: CARDIOLOGY CLINIC | Age: 68
End: 2023-05-04
Payer: COMMERCIAL

## 2023-05-04 VITALS
DIASTOLIC BLOOD PRESSURE: 60 MMHG | HEIGHT: 68 IN | BODY MASS INDEX: 31.55 KG/M2 | WEIGHT: 208.2 LBS | HEART RATE: 68 BPM | SYSTOLIC BLOOD PRESSURE: 110 MMHG

## 2023-05-04 DIAGNOSIS — R00.1 BRADYCARDIA: ICD-10-CM

## 2023-05-04 DIAGNOSIS — I10 ESSENTIAL HYPERTENSION: ICD-10-CM

## 2023-05-04 DIAGNOSIS — I87.2 VENOUS REFLUX: Primary | ICD-10-CM

## 2023-05-04 DIAGNOSIS — I42.9 CARDIOMYOPATHY, UNSPECIFIED TYPE (HCC): ICD-10-CM

## 2023-05-04 PROCEDURE — G8399 PT W/DXA RESULTS DOCUMENT: HCPCS | Performed by: NURSE PRACTITIONER

## 2023-05-04 PROCEDURE — 1123F ACP DISCUSS/DSCN MKR DOCD: CPT | Performed by: NURSE PRACTITIONER

## 2023-05-04 PROCEDURE — 3017F COLORECTAL CA SCREEN DOC REV: CPT | Performed by: NURSE PRACTITIONER

## 2023-05-04 PROCEDURE — 1036F TOBACCO NON-USER: CPT | Performed by: NURSE PRACTITIONER

## 2023-05-04 PROCEDURE — 1090F PRES/ABSN URINE INCON ASSESS: CPT | Performed by: NURSE PRACTITIONER

## 2023-05-04 PROCEDURE — 3078F DIAST BP <80 MM HG: CPT | Performed by: NURSE PRACTITIONER

## 2023-05-04 PROCEDURE — 3074F SYST BP LT 130 MM HG: CPT | Performed by: NURSE PRACTITIONER

## 2023-05-04 PROCEDURE — 99214 OFFICE O/P EST MOD 30 MIN: CPT | Performed by: NURSE PRACTITIONER

## 2023-05-04 PROCEDURE — G8427 DOCREV CUR MEDS BY ELIG CLIN: HCPCS | Performed by: NURSE PRACTITIONER

## 2023-05-04 PROCEDURE — G8417 CALC BMI ABV UP PARAM F/U: HCPCS | Performed by: NURSE PRACTITIONER

## 2023-05-04 RX ORDER — METOPROLOL SUCCINATE 25 MG/1
25 TABLET, EXTENDED RELEASE ORAL DAILY
Qty: 30 TABLET | Refills: 1 | Status: CANCELLED | OUTPATIENT
Start: 2023-05-04

## 2023-05-04 RX ORDER — LISINOPRIL 5 MG/1
5 TABLET ORAL DAILY
Qty: 30 TABLET | Refills: 2 | Status: SHIPPED | OUTPATIENT
Start: 2023-05-04

## 2023-05-04 RX ORDER — ATORVASTATIN CALCIUM 20 MG/1
20 TABLET, FILM COATED ORAL DAILY
Qty: 30 TABLET | Refills: 3 | Status: SHIPPED | OUTPATIENT
Start: 2023-05-04

## 2023-05-04 RX ORDER — FUROSEMIDE 20 MG/1
TABLET ORAL
Qty: 30 TABLET | Refills: 2 | Status: SHIPPED | OUTPATIENT
Start: 2023-05-04

## 2023-05-04 NOTE — PROGRESS NOTES
[facility] [place]  [address] [facility],Third [address]  Phone: [phone]    Fax [phone]                  Janet Bhatia MD, Mara Angulo MD, Manuel Gamble MD, MD Gene Ferraro, MD Rojas Arana, APRVIVIANA Gerardo, ALBERTO Putnam, APRVIVIANA Mix, APRVIVIANA        Cardiology Progress Note      5/4/2023    RE: Porter Singh  (1955)                             Primary cardiologist: Dr. Meena Blackmon:   1. Cardiomyopathy, unspecified type ([address]    2. Bradycardia    3. Essential hypertension    4. Venous reflux          HPI: Porter Singh, who is a  74y.o. year old female with a past medical history as listed below. Patient presents to the office for follow up on cardiomyopathy, bradycardia, hypertension, and venous reflux. Patient reports occasional dizziness associated with position change. Patient previously had medications adjusted due to bradycardia. Patient denies any chest pain, shortness of breath,  syncope, or palpitations. Past Medical History:   Diagnosis Date    H/O arthritis     09- Patient reports arthritis in Knee    H/O Doppler lower venous ultrasound 03/10/2021    Significant reflux noted bilat. H/O echocardiogram Limited 03/04/2021    EF 40-45%. History of echocardiogram 07/17/2021    EF 50%, Normal    History of IBS     09- Patient told she has IBS    History of nuclear MUGA test 10/26/2020    EF 35%. Pancreatitis        Current Outpatient Medications   Medication Sig Dispense Refill    lisinopril (PRINIVIL;ZESTRIL) 5 MG tablet Take 1 tablet by mouth daily 30 tablet 2    metoprolol succinate (TOPROL XL) 25 MG extended release tablet Take 1 tablet by mouth daily 30 tablet 1    furosemide (LASIX) 20 MG tablet Take one tab for a weight gain of 3 lbs in a day or 5 lbs in a week.  30 tablet 2    pantoprazole (PROTONIX) 40 MG tablet Take 1 tablet

## 2023-08-07 ENCOUNTER — OFFICE VISIT (OUTPATIENT)
Dept: CARDIOLOGY CLINIC | Age: 68
End: 2023-08-07
Payer: COMMERCIAL

## 2023-08-07 VITALS
SYSTOLIC BLOOD PRESSURE: 110 MMHG | HEIGHT: 68 IN | HEART RATE: 72 BPM | WEIGHT: 223.6 LBS | BODY MASS INDEX: 33.89 KG/M2 | DIASTOLIC BLOOD PRESSURE: 72 MMHG

## 2023-08-07 DIAGNOSIS — I42.8 NICM (NONISCHEMIC CARDIOMYOPATHY) (HCC): ICD-10-CM

## 2023-08-07 DIAGNOSIS — R00.1 BRADYCARDIA: ICD-10-CM

## 2023-08-07 DIAGNOSIS — I73.9 PVD (PERIPHERAL VASCULAR DISEASE) (HCC): ICD-10-CM

## 2023-08-07 DIAGNOSIS — E66.9 OBESITY (BMI 35.0-39.9 WITHOUT COMORBIDITY): Primary | ICD-10-CM

## 2023-08-07 DIAGNOSIS — I87.2 VENOUS REFLUX: ICD-10-CM

## 2023-08-07 PROCEDURE — 99214 OFFICE O/P EST MOD 30 MIN: CPT | Performed by: INTERNAL MEDICINE

## 2023-08-07 PROCEDURE — G8399 PT W/DXA RESULTS DOCUMENT: HCPCS | Performed by: INTERNAL MEDICINE

## 2023-08-07 PROCEDURE — 1036F TOBACCO NON-USER: CPT | Performed by: INTERNAL MEDICINE

## 2023-08-07 PROCEDURE — 3078F DIAST BP <80 MM HG: CPT | Performed by: INTERNAL MEDICINE

## 2023-08-07 PROCEDURE — G8417 CALC BMI ABV UP PARAM F/U: HCPCS | Performed by: INTERNAL MEDICINE

## 2023-08-07 PROCEDURE — 1090F PRES/ABSN URINE INCON ASSESS: CPT | Performed by: INTERNAL MEDICINE

## 2023-08-07 PROCEDURE — 1123F ACP DISCUSS/DSCN MKR DOCD: CPT | Performed by: INTERNAL MEDICINE

## 2023-08-07 PROCEDURE — G8427 DOCREV CUR MEDS BY ELIG CLIN: HCPCS | Performed by: INTERNAL MEDICINE

## 2023-08-07 PROCEDURE — 3017F COLORECTAL CA SCREEN DOC REV: CPT | Performed by: INTERNAL MEDICINE

## 2023-08-07 PROCEDURE — 3074F SYST BP LT 130 MM HG: CPT | Performed by: INTERNAL MEDICINE

## 2023-08-07 RX ORDER — CARIPRAZINE 1.5 MG/1
CAPSULE, GELATIN COATED ORAL
COMMUNITY
Start: 2023-05-03

## 2023-08-07 RX ORDER — PHENTERMINE HYDROCHLORIDE 37.5 MG/1
37.5 TABLET ORAL
Qty: 30 TABLET | Refills: 0 | Status: SHIPPED | OUTPATIENT
Start: 2023-08-07 | End: 2023-09-06

## 2023-08-07 NOTE — PROGRESS NOTES
0    Blood Pressure KIT 1 Units by Does not apply route daily 1 kit 0    ondansetron (ZOFRAN) 4 MG tablet Take 1 tablet by mouth 3 times daily as needed for Nausea or Vomiting 15 tablet 0    acyclovir (ZOVIRAX) 800 MG tablet Take 200 mg by mouth 2 times daily      loratadine (CLARITIN) 10 MG capsule Take 1 capsule by mouth daily      docusate sodium (COLACE) 100 MG capsule Take 1 capsule by mouth 2 times daily as needed for Constipation       No current facility-administered medications for this visit. Allergies: Keflex [cephalexin]  Past Medical History:   Diagnosis Date    H/O arthritis     09- Patient reports arthritis in Knee    H/O Doppler lower venous ultrasound 03/10/2021    Significant reflux noted bilat. H/O echocardiogram Limited 03/04/2021    EF 40-45%. History of echocardiogram 07/17/2021    EF 50%, Normal    History of IBS     09- Patient told she has IBS    History of nuclear MUGA test 10/26/2020    EF 35%.     Pancreatitis      Past Surgical History:   Procedure Laterality Date    CARDIAC CATHETERIZATION      CHOLECYSTECTOMY, LAPAROSCOPIC N/A 8/22/2020    CHOLECYSTECTOMY LAPAROSCOPIC performed by Suresh Valdes MD at [address] [place] [address]  9-    HYSTERECTOMY (CERVIX STATUS UNKNOWN)      total    OVARY REMOVAL      age 48     Family History   Problem Relation Age of Onset    Heart Failure Brother     Breast Cancer Maternal Aunt 79    Ovarian Cancer Neg Hx      Social History     Tobacco Use    Smoking status: Never    Smokeless tobacco: Never   Substance Use Topics    Alcohol use: Not Currently     Comment: Caffeine: iced tea       [unfilled]  Review of Systems:   Constitutional: No Fever or Weight Loss   Eyes: No Decreased Vision  ENT: No Headaches, Hearing Loss or Vertigo  Cardiovascular: No chest pain, dyspnea on exertion, palpitations or loss of consciousness  Respiratory: No cough or wheezing    Gastrointestinal: No abdominal pain, appetite loss, blood in stools,

## 2023-08-07 NOTE — PATIENT INSTRUCTIONS
We are committed to providing you the best care possible. If you receive a survey after visiting one of our offices, please take time to share your experience concerning your physician office visit. These surveys are confidential and no health information about you is shared. We are eager to improve for you and we are counting on your feedback to help make that happen. **It is YOUR responsibilty to bring medication bottles and/or updated medication list to [facility]. This will allow us to better serve you and all your healthcare needs**    Thank you for allowing us to care for you today! We want to ensure we can follow your treatment plan and we strive to give you the best outcomes and experience possible. If you ever have a life threatening emergency and call 911 - for an ambulance (EMS)   Our providers can only care for you at:   [facility], located within [facility]. Even if you have someone take you or you drive yourself we can only care for you in a [facility] facility. Our providers are not setup at the other healthcare locations!

## 2024-03-06 ENCOUNTER — TRANSCRIBE ORDERS (OUTPATIENT)
Dept: ADMINISTRATIVE | Age: 69
End: 2024-03-06

## 2024-03-06 DIAGNOSIS — Z12.31 ENCOUNTER FOR SCREENING MAMMOGRAM FOR MALIGNANT NEOPLASM OF BREAST: Primary | ICD-10-CM

## 2024-03-15 ENCOUNTER — OFFICE VISIT (OUTPATIENT)
Dept: CARDIOLOGY CLINIC | Age: 69
End: 2024-03-15

## 2024-03-15 VITALS
OXYGEN SATURATION: 97 % | HEIGHT: 68 IN | DIASTOLIC BLOOD PRESSURE: 84 MMHG | RESPIRATION RATE: 16 BRPM | HEART RATE: 81 BPM | BODY MASS INDEX: 32.98 KG/M2 | WEIGHT: 217.6 LBS | SYSTOLIC BLOOD PRESSURE: 136 MMHG

## 2024-03-15 DIAGNOSIS — E66.9 OBESITY (BMI 35.0-39.9 WITHOUT COMORBIDITY): Primary | ICD-10-CM

## 2024-03-15 RX ORDER — PHENTERMINE HYDROCHLORIDE 37.5 MG/1
37.5 TABLET ORAL
Qty: 30 TABLET | Refills: 0 | Status: SHIPPED | OUTPATIENT
Start: 2024-03-15 | End: 2024-04-14

## 2024-03-15 RX ORDER — SERTRALINE HYDROCHLORIDE 100 MG/1
100 TABLET, FILM COATED ORAL DAILY
COMMUNITY
Start: 2024-02-19 | End: 2024-03-15 | Stop reason: ALTCHOICE

## 2024-03-15 NOTE — PROGRESS NOTES
Rell Kennedy MD        OFFICE  FOLLOWUP NOTE    Chief complaints: patient is here for management of  NICM, pancreatitis,IBS, post op for knee, CVI   Subjective: patient feels better, no chest pain, no shortness of breath, no dizziness, no palpitations    ED Regalado is a 68 y.o.year old who  has a past medical history of H/O arthritis, H/O Doppler lower venous ultrasound, H/O echocardiogram Limited, History of echocardiogram, History of IBS, History of nuclear MUGA test, and Pancreatitis. and presents for management of  NICM, pancreatitis,IBS, post op for knee, CVI  which are well controlled      Current Outpatient Medications   Medication Sig Dispense Refill    sertraline (ZOLOFT) 100 MG tablet Take 1 tablet by mouth daily      VRAYLAR 1.5 MG capsule       atorvastatin (LIPITOR) 20 MG tablet Take 1 tablet by mouth daily 30 tablet 3    lisinopril (PRINIVIL;ZESTRIL) 5 MG tablet Take 1 tablet by mouth daily 30 tablet 2    Elastic Bandages & Supports (MEDICAL COMPRESSION THIGH HIGH) MISC 2 each by Does not apply route daily Wear daily and remove nightly   20 - 30 mmgh 2 each 1    ondansetron (ZOFRAN ODT) 4 MG disintegrating tablet Take 1 tablet by mouth every 6 hours 10 tablet 0    traMADol (ULTRAM) 50 MG tablet as needed.      calcium carbonate 1500 (600 Ca) MG TABS tablet Take 1 tablet by mouth daily      traZODone (DESYREL) 50 MG tablet Take 1 tablet by mouth nightly (Patient taking differently: Take 2 tablets by mouth nightly) 30 tablet 0    Blood Pressure KIT 1 Units by Does not apply route daily 1 kit 0    ondansetron (ZOFRAN) 4 MG tablet Take 1 tablet by mouth 3 times daily as needed for Nausea or Vomiting 15 tablet 0    acyclovir (ZOVIRAX) 800 MG tablet Take 200 mg by mouth 2 times daily      loratadine (CLARITIN) 10 MG capsule Take 1 capsule by mouth daily      docusate sodium (COLACE) 100 MG capsule Take 1 capsule by mouth 2 times daily as needed for Constipation      furosemide (LASIX)

## 2024-05-10 ENCOUNTER — OFFICE VISIT (OUTPATIENT)
Dept: CARDIOLOGY CLINIC | Age: 69
End: 2024-05-10
Payer: COMMERCIAL

## 2024-05-10 VITALS
OXYGEN SATURATION: 93 % | WEIGHT: 219.4 LBS | DIASTOLIC BLOOD PRESSURE: 78 MMHG | HEART RATE: 62 BPM | BODY MASS INDEX: 33.25 KG/M2 | SYSTOLIC BLOOD PRESSURE: 136 MMHG | HEIGHT: 68 IN

## 2024-05-10 DIAGNOSIS — I42.9 CARDIOMYOPATHY, UNSPECIFIED TYPE (HCC): ICD-10-CM

## 2024-05-10 DIAGNOSIS — E66.9 OBESITY (BMI 35.0-39.9 WITHOUT COMORBIDITY): Primary | ICD-10-CM

## 2024-05-10 DIAGNOSIS — I73.9 PVD (PERIPHERAL VASCULAR DISEASE) (HCC): ICD-10-CM

## 2024-05-10 DIAGNOSIS — I10 ESSENTIAL HYPERTENSION: ICD-10-CM

## 2024-05-10 DIAGNOSIS — I87.2 VENOUS REFLUX: ICD-10-CM

## 2024-05-10 DIAGNOSIS — R00.1 BRADYCARDIA: ICD-10-CM

## 2024-05-10 PROCEDURE — 1123F ACP DISCUSS/DSCN MKR DOCD: CPT | Performed by: INTERNAL MEDICINE

## 2024-05-10 PROCEDURE — G8427 DOCREV CUR MEDS BY ELIG CLIN: HCPCS | Performed by: INTERNAL MEDICINE

## 2024-05-10 PROCEDURE — 3075F SYST BP GE 130 - 139MM HG: CPT | Performed by: INTERNAL MEDICINE

## 2024-05-10 PROCEDURE — G8417 CALC BMI ABV UP PARAM F/U: HCPCS | Performed by: INTERNAL MEDICINE

## 2024-05-10 PROCEDURE — 3017F COLORECTAL CA SCREEN DOC REV: CPT | Performed by: INTERNAL MEDICINE

## 2024-05-10 PROCEDURE — 1090F PRES/ABSN URINE INCON ASSESS: CPT | Performed by: INTERNAL MEDICINE

## 2024-05-10 PROCEDURE — 3078F DIAST BP <80 MM HG: CPT | Performed by: INTERNAL MEDICINE

## 2024-05-10 PROCEDURE — 99214 OFFICE O/P EST MOD 30 MIN: CPT | Performed by: INTERNAL MEDICINE

## 2024-05-10 PROCEDURE — 1036F TOBACCO NON-USER: CPT | Performed by: INTERNAL MEDICINE

## 2024-05-10 PROCEDURE — G8399 PT W/DXA RESULTS DOCUMENT: HCPCS | Performed by: INTERNAL MEDICINE

## 2024-05-10 RX ORDER — PHENTERMINE HYDROCHLORIDE 37.5 MG/1
37.5 TABLET ORAL
Qty: 30 TABLET | Refills: 0 | Status: SHIPPED | OUTPATIENT
Start: 2024-05-10 | End: 2024-06-09

## 2024-05-10 NOTE — PROGRESS NOTES
no varicose veins    Abdomen - No masses, tenderness, or organomegaly, no hepato-splenomegally, no bruits  Musculoskeletal - No significant edema, no kyphosis or scoliosis, no deformity in any extremity noted, muscle strength and tone are normal  Skin: no ulcer,no scar,no stasis dermatitis   Neurologic - alert oriented times 3,Cranial nerves II through XII are grossly intact.  There were no gross focal neurologic abnormalities.   Psychiatric: normal mood and affect    No results found for: \"CKTOTAL\", \"CKMB\", \"CKMBINDEX\", \"TROPONINI\"  BNP:  No results found for: \"BNP\"  PT/INR:  No results found for: \"PTINR\"  Lab Results   Component Value Date    LABA1C 5.0 07/29/2012     Lab Results   Component Value Date    CHOL 144 01/29/2021    TRIG 106 01/29/2021    HDL 57 01/29/2021    LDLDIRECT 102 (H) 07/29/2012     Lab Results   Component Value Date    ALT 16 06/10/2021    AST 28 06/10/2021     TSH:  No results found for: \"TSH\"    Impression:  Sabine is a 68 y.o.year old who  has a past medical history of H/O arthritis, H/O Doppler lower venous ultrasound, H/O echocardiogram Limited, History of echocardiogram, History of IBS, History of nuclear MUGA test, and Pancreatitis. and presents with     Plan:  Post op knee: no cardiac complications noted, she gained 41 lbs  Weight gain she did not use adipex as her insurance did not approve,refill  adipex and off zoloft  Bradycardia\" decrease metoprolol to 25mg daily  NICM\": resolved ,she is in so much stressed out as his son is shot in the neck and is paralyzed, she has gained 46  lbs due to stress,initially she had lost 75 lbs, she is OFF lasix,  will continue low dose lasix prn for leg swelling, BB AND lisinopril and her LVEF improved,no need for ICD at this time, LVEF  On MUGA was 35%, however, her repeat echo were normal   Stress and insomnia: on trazadone  Stasis dermatitis and leg swelling: significant venous reflux disease noted in venous doppler ordered, recommend to use

## 2024-05-10 NOTE — PATIENT INSTRUCTIONS
**It is YOUR responsibilty to bring medication bottles and/or updated medication list to EACH APPOINTMENT. This will allow us to better serve you and all your healthcare needs**   Thank you for allowing us to care for you today!   We want to ensure we can follow your treatment plan and we strive to give you the best outcomes and experience possible.   If you ever have a life threatening emergency and call 911 - for an ambulance (EMS)   Our providers can only care for you at:   [facility] or [facility].   Even if you have someone take you or you drive yourself we can only care for you in a [facility]. Our providers are not setup at the other healthcare locations!   Please be informed that if you contact our office outside of normal business hours the physician on call cannot help with any scheduling or rescheduling issues, procedure instruction questions or any type of medication issue.    We advise you for any urgent/emergency that you go to the nearest emergency room!    PLEASE CALL OUR OFFICE DURING NORMAL BUSINESS HOURS    Monday - Friday   8 am to 5 pm    [place]: [phone]    [place]: [phone]    [place]:  [phone]  We are committed to providing you the best care possible.    If you receive a survey after visiting one of our offices, please take time to share your experience concerning your physician office visit.  These surveys are confidential and no health information about you is shared.    We are eager to improve for you and we are counting on your feedback to help make that happen.

## 2024-05-13 ENCOUNTER — TELEPHONE (OUTPATIENT)
Dept: CARDIOLOGY CLINIC | Age: 69
End: 2024-05-13

## 2024-05-13 NOTE — TELEPHONE ENCOUNTER
Spoke to the pharmacy, it was a mistake they called here.   Patient already received her medication

## 2024-05-13 NOTE — TELEPHONE ENCOUNTER
The pharmacist from [facility] ( [facility]) called in and state they have misplaced the pt's hard copy rx of Phentermine 37.5 mg tablet and was wondering if someone could electronically send it over or call it in.    Male

## 2024-06-05 LAB
ALBUMIN: 4.8 G/DL
ALP BLD-CCNC: 87 U/L
ALT SERPL-CCNC: 15 U/L
ANION GAP SERPL CALCULATED.3IONS-SCNC: NORMAL MMOL/L
AST SERPL-CCNC: 24 U/L
BASOPHILS ABSOLUTE: NORMAL
BASOPHILS RELATIVE PERCENT: NORMAL
BILIRUB SERPL-MCNC: 0.5 MG/DL (ref 0.1–1.4)
BUN BLDV-MCNC: 14 MG/DL
CALCIUM SERPL-MCNC: 10.1 MG/DL
CHLORIDE BLD-SCNC: 103 MMOL/L
CHOLESTEROL, TOTAL: 171 MG/DL
CHOLESTEROL/HDL RATIO: NORMAL
CO2: 28 MMOL/L
CREAT SERPL-MCNC: 1 MG/DL
EOSINOPHILS ABSOLUTE: NORMAL
EOSINOPHILS RELATIVE PERCENT: NORMAL
GFR, ESTIMATED: 62
GLUCOSE BLD-MCNC: 94 MG/DL
HCT VFR BLD CALC: 41.3 % (ref 36–46)
HDLC SERPL-MCNC: 69 MG/DL (ref 35–70)
HEMOGLOBIN: 13.9 G/DL (ref 12–16)
LDL CHOLESTEROL: 78
LYMPHOCYTES ABSOLUTE: NORMAL
LYMPHOCYTES RELATIVE PERCENT: NORMAL
MCH RBC QN AUTO: 32 PG
MCHC RBC AUTO-ENTMCNC: 33.7 G/DL
MCV RBC AUTO: 94.9 FL
MONOCYTES ABSOLUTE: NORMAL
MONOCYTES RELATIVE PERCENT: NORMAL
NEUTROPHILS ABSOLUTE: NORMAL
NEUTROPHILS RELATIVE PERCENT: NORMAL
NONHDLC SERPL-MCNC: NORMAL MG/DL
PLATELET # BLD: 219 K/ΜL
PMV BLD AUTO: 9.9 FL
POTASSIUM SERPL-SCNC: 4.4 MMOL/L
RBC # BLD: 4.35 10^6/ΜL
SODIUM BLD-SCNC: 144 MMOL/L
TOTAL PROTEIN: 7.7 G/DL (ref 6.4–8.2)
TRIGL SERPL-MCNC: 121 MG/DL
TSH SERPL DL<=0.05 MIU/L-ACNC: 2.58 UIU/ML
VLDLC SERPL CALC-MCNC: 24 MG/DL
WBC # BLD: 5.6 10^3/ML

## 2024-06-17 ENCOUNTER — OFFICE VISIT (OUTPATIENT)
Dept: CARDIOLOGY CLINIC | Age: 69
End: 2024-06-17
Payer: COMMERCIAL

## 2024-06-17 VITALS
SYSTOLIC BLOOD PRESSURE: 130 MMHG | HEIGHT: 68 IN | BODY MASS INDEX: 32.46 KG/M2 | OXYGEN SATURATION: 96 % | WEIGHT: 214.2 LBS | HEART RATE: 78 BPM | DIASTOLIC BLOOD PRESSURE: 72 MMHG

## 2024-06-17 DIAGNOSIS — E66.9 OBESITY (BMI 30.0-34.9): ICD-10-CM

## 2024-06-17 DIAGNOSIS — I42.9 CARDIOMYOPATHY, UNSPECIFIED TYPE (HCC): Primary | ICD-10-CM

## 2024-06-17 DIAGNOSIS — I87.2 VENOUS REFLUX: ICD-10-CM

## 2024-06-17 DIAGNOSIS — I10 ESSENTIAL HYPERTENSION: ICD-10-CM

## 2024-06-17 PROBLEM — E66.811 OBESITY (BMI 30.0-34.9): Status: ACTIVE | Noted: 2024-06-17

## 2024-06-17 PROCEDURE — 1123F ACP DISCUSS/DSCN MKR DOCD: CPT | Performed by: NURSE PRACTITIONER

## 2024-06-17 PROCEDURE — 3078F DIAST BP <80 MM HG: CPT | Performed by: NURSE PRACTITIONER

## 2024-06-17 PROCEDURE — 1090F PRES/ABSN URINE INCON ASSESS: CPT | Performed by: NURSE PRACTITIONER

## 2024-06-17 PROCEDURE — 3075F SYST BP GE 130 - 139MM HG: CPT | Performed by: NURSE PRACTITIONER

## 2024-06-17 PROCEDURE — 1036F TOBACCO NON-USER: CPT | Performed by: NURSE PRACTITIONER

## 2024-06-17 PROCEDURE — 99214 OFFICE O/P EST MOD 30 MIN: CPT | Performed by: NURSE PRACTITIONER

## 2024-06-17 PROCEDURE — G8399 PT W/DXA RESULTS DOCUMENT: HCPCS | Performed by: NURSE PRACTITIONER

## 2024-06-17 PROCEDURE — G8417 CALC BMI ABV UP PARAM F/U: HCPCS | Performed by: NURSE PRACTITIONER

## 2024-06-17 PROCEDURE — 3017F COLORECTAL CA SCREEN DOC REV: CPT | Performed by: NURSE PRACTITIONER

## 2024-06-17 PROCEDURE — G8427 DOCREV CUR MEDS BY ELIG CLIN: HCPCS | Performed by: NURSE PRACTITIONER

## 2024-06-17 RX ORDER — PHENTERMINE HYDROCHLORIDE 37.5 MG/1
37.5 TABLET ORAL
Qty: 30 TABLET | Refills: 0 | Status: SHIPPED | OUTPATIENT
Start: 2024-06-17 | End: 2024-07-17

## 2024-06-17 NOTE — PROGRESS NOTES
04/25/24 97.7 kg (215 lb 6.4 oz)     Body mass index is 32.57 kg/m².    Physical Exam  Constitutional:       Appearance: She is well-developed.   Cardiovascular:      Rate and Rhythm: Normal rate and regular rhythm.      Pulses: Intact distal pulses.           Dorsalis pedis pulses are 2+ on the right side and 2+ on the left side.        Posterior tibial pulses are 2+ on the right side and 2+ on the left side.      Heart sounds: Normal heart sounds, S1 normal and S2 normal.   Pulmonary:      Effort: Pulmonary effort is normal.      Breath sounds: Normal breath sounds.   Musculoskeletal:         General: Normal range of motion.   Skin:     General: Skin is warm and dry.   Neurological:      Mental Status: She is alert and oriented to person, place, and time.          DATA:  No results found for: \"CKTOTAL\", \"CKMB\", \"CKMBINDEX\", \"TROPONINI\"  BNP:  No results found for: \"BNP\"  PT/INR:  No results found for: \"PTINR\"  Lab Results   Component Value Date    LABA1C 5.0 07/29/2012     Lab Results   Component Value Date    CHOL 144 01/29/2021    TRIG 106 01/29/2021    HDL 57 01/29/2021    LDLDIRECT 102 (H) 07/29/2012     Lab Results   Component Value Date    ALT 16 06/10/2021    AST 28 06/10/2021     TSH:  No results found for: \"TSH\"    Vitals:    06/17/24 1156   BP: 130/72   Pulse: 78   SpO2: 96%       Echo:8/27/20  Left ventricular systolic function is abnormal.   Ejection fraction is visually estimated at 15-20%.   Mild to moderate mitral regurgitation.   Mild tricuspid regurgitation; RVSP: 53 mmHg consistent with moderate PHTN.   No evidence of any pericardial effusion.    Echo: 3/4/21  Left ventricular systolic function is mildly depressed with an ejection   fraction of 40-45%.   Grade 1 Diastolic Dysfunction.   No regional wall motion abnormalities.   Normal pulmonary artery pressure with a RVSP of 27mmHg.   No evidence of pericardial effusion.    Echo: 7/17/21  Left ventricular systolic function is normal with an

## 2024-07-16 ENCOUNTER — OFFICE VISIT (OUTPATIENT)
Dept: CARDIOLOGY CLINIC | Age: 69
End: 2024-07-16
Payer: COMMERCIAL

## 2024-07-16 VITALS
DIASTOLIC BLOOD PRESSURE: 74 MMHG | WEIGHT: 207 LBS | BODY MASS INDEX: 31.37 KG/M2 | HEART RATE: 76 BPM | HEIGHT: 68 IN | SYSTOLIC BLOOD PRESSURE: 132 MMHG

## 2024-07-16 DIAGNOSIS — I87.2 VENOUS REFLUX: ICD-10-CM

## 2024-07-16 DIAGNOSIS — E66.9 OBESITY (BMI 30.0-34.9): ICD-10-CM

## 2024-07-16 DIAGNOSIS — Z86.79 HX OF CARDIOMYOPATHY: Primary | ICD-10-CM

## 2024-07-16 DIAGNOSIS — I10 ESSENTIAL HYPERTENSION: ICD-10-CM

## 2024-07-16 PROCEDURE — 1090F PRES/ABSN URINE INCON ASSESS: CPT | Performed by: NURSE PRACTITIONER

## 2024-07-16 PROCEDURE — 3075F SYST BP GE 130 - 139MM HG: CPT | Performed by: NURSE PRACTITIONER

## 2024-07-16 PROCEDURE — G8427 DOCREV CUR MEDS BY ELIG CLIN: HCPCS | Performed by: NURSE PRACTITIONER

## 2024-07-16 PROCEDURE — 99214 OFFICE O/P EST MOD 30 MIN: CPT | Performed by: NURSE PRACTITIONER

## 2024-07-16 PROCEDURE — 3078F DIAST BP <80 MM HG: CPT | Performed by: NURSE PRACTITIONER

## 2024-07-16 PROCEDURE — 1123F ACP DISCUSS/DSCN MKR DOCD: CPT | Performed by: NURSE PRACTITIONER

## 2024-07-16 PROCEDURE — 1036F TOBACCO NON-USER: CPT | Performed by: NURSE PRACTITIONER

## 2024-07-16 PROCEDURE — G8417 CALC BMI ABV UP PARAM F/U: HCPCS | Performed by: NURSE PRACTITIONER

## 2024-07-16 PROCEDURE — G8399 PT W/DXA RESULTS DOCUMENT: HCPCS | Performed by: NURSE PRACTITIONER

## 2024-07-16 PROCEDURE — 3017F COLORECTAL CA SCREEN DOC REV: CPT | Performed by: NURSE PRACTITIONER

## 2024-07-16 RX ORDER — PHENTERMINE HYDROCHLORIDE 37.5 MG/1
37.5 TABLET ORAL
Qty: 30 TABLET | Refills: 2 | Status: SHIPPED | OUTPATIENT
Start: 2024-07-16 | End: 2024-10-14

## 2024-07-16 NOTE — PATIENT INSTRUCTIONS
Please be informed that if you contact our office outside of normal business hours the physician on call cannot help with any scheduling or rescheduling issues, procedure instruction questions or any type of medication issue.    We advise you for any urgent/emergency that you go to the nearest emergency room!    PLEASE CALL OUR OFFICE DURING NORMAL BUSINESS HOURS    Monday - Friday   8 am to 5 pm    [place]: [phone]    [place]: [phone]    [place]:  [phone]  Thank you for allowing us to care for you today!   We want to ensure we can follow your treatment plan and we strive to give you the best outcomes and experience possible.   If you ever have a life threatening emergency and call 911 - for an ambulance (EMS)   Our providers can only care for you at:   [facility] or [facility].   Even if you have someone take you or you drive yourself we can only care for you in a [facility] facility. Our providers are not setup at the other healthcare locations!   **It is YOUR responsibilty to bring medication bottles and/or updated medication list to EACH APPOINTMENT. This will allow us to better serve you and all your healthcare needs**

## 2024-07-16 NOTE — PROGRESS NOTES
[place] [address]  Phone: [phone]    Fax [phone]                  Dmitri Jay MD, [facility]       Jah Aguilera MD, [facility]  Willy Landa MD, [facility]    MD Rell Bermudez MD Tariq Rizvi, MD Melissa Kellis, ALBERTO Shaw, ALBERTO Bah, ALBERTO Raymundo, APRVIVIANA        Cardiology Progress Note      7/16/2024    RE: Sabine Childs  (1955)                             Primary cardiologist: Dr. Elie Kennedy     CC:   1. Hx of cardiomyopathy    2. Essential hypertension    3. Obesity (BMI 30.0-34.9)    4. Venous reflux        HPI: Sabine Childs, who is a  68 y.o. year old female with a past medical history as listed below. Patient presents to the office for follow up on cardiomyopathy, hypertension, and venous reflux.   Patient denies any chest pain, shortness of breath,  syncope, or palpitations.    Past Medical History:   Diagnosis Date    H/O arthritis     09- Patient reports arthritis in Knee    H/O Doppler lower venous ultrasound 03/10/2021    Significant reflux noted bilat.    H/O echocardiogram Limited 03/04/2021    EF 40-45%.    History of echocardiogram 07/17/2021    EF 50%, Normal    History of IBS     09- Patient told she has IBS    History of nuclear MUGA test 10/26/2020    EF 35%.    Pancreatitis        Current Outpatient Medications   Medication Sig Dispense Refill    phentermine (ADIPEX-P) 37.5 MG tablet Take 1 tablet by mouth every morning (before breakfast) for 90 days. Max Daily Amount: 37.5 mg 30 tablet 2    phentermine (ADIPEX-P) 37.5 MG tablet Take 1 tablet by mouth every morning (before breakfast) for 30 days. Max Daily Amount: 37.5 mg 30 tablet 0    VRAYLAR 1.5 MG capsule       atorvastatin (LIPITOR) 20 MG tablet Take 1 tablet by mouth daily 30 tablet 3    lisinopril (PRINIVIL;ZESTRIL) 5 MG tablet Take 1 tablet by mouth daily 30 tablet 2    Elastic Bandages &

## 2024-07-18 ENCOUNTER — TELEPHONE (OUTPATIENT)
Dept: CARDIOLOGY CLINIC | Age: 69
End: 2024-07-18

## 2024-07-18 NOTE — TELEPHONE ENCOUNTER
Called [facility] and requested copy of most recent lipid panel. Left VM requesting that lipid panel results be faxed to us.     ----- Message from ALBERTO Rhodes CNP sent at 7/16/2024  2:43 PM EDT -----  Regarding: lipid panel from PCP  Please get up dated lipid panel from PCP.

## 2024-10-08 ENCOUNTER — TELEPHONE (OUTPATIENT)
Dept: CARDIOLOGY CLINIC | Age: 69
End: 2024-10-08

## (undated) DEVICE — Device

## (undated) DEVICE — SET TBNG DISP TIP FOR AHTO

## (undated) DEVICE — SUTURE MCRYL SZ 4-0 L18IN ABSRB UD L19MM PS-2 3/8 CIR PRIM Y496G

## (undated) DEVICE — BAG SPEC REM 224ML W4XL6IN DIA10MM 1 HND GYN DISP ENDOPCH

## (undated) DEVICE — Z DUP USE 2641840 CLIP INT L POLYMER LOK LIG HEM O LOK

## (undated) DEVICE — GLOVE SURG SZ 65 CRM LTX FREE POLYISOPRENE POLYMER BEAD ANTI

## (undated) DEVICE — NEEDLE INSUF L120MM DIA2MM DISP FOR PNEUMOPERI ENDOPATH

## (undated) DEVICE — TUBING INSUFFLATOR HEAT HI FLO SET PNEUMOCLEAR

## (undated) DEVICE — ADHESIVE SKIN CLSR 0.7ML TOP DERMBND ADV

## (undated) DEVICE — TROCAR: Brand: KII FIOS FIRST ENTRY

## (undated) DEVICE — SOLUTION IV 1000ML 0.9% SOD CHL FOR IRRIG PLAS CONT

## (undated) DEVICE — SYRINGE MED 20ML STD CLR PLAS LUERLOCK TIP N CTRL DISP

## (undated) DEVICE — APPLICATOR MEDICATED 26 CC SOLUTION HI LT ORNG CHLORAPREP

## (undated) DEVICE — TROCAR: Brand: KII® SLEEVE

## (undated) DEVICE — GARMENT,MEDLINE,DVT,INT,CALF,MED, GEN2: Brand: MEDLINE

## (undated) DEVICE — AGENT HEMSTAT W2XL4IN OXIDIZED REGENERATED CELOS ABSRB

## (undated) DEVICE — ELECTRODE ES AD CRDLSS PT RET REM POLYHESIVE